# Patient Record
Sex: MALE | Race: AMERICAN INDIAN OR ALASKA NATIVE | NOT HISPANIC OR LATINO | Employment: FULL TIME | URBAN - METROPOLITAN AREA
[De-identification: names, ages, dates, MRNs, and addresses within clinical notes are randomized per-mention and may not be internally consistent; named-entity substitution may affect disease eponyms.]

---

## 2023-01-01 ENCOUNTER — HOSPITAL ENCOUNTER (INPATIENT)
Facility: MEDICAL CENTER | Age: 28
LOS: 4 days | DRG: 432 | End: 2023-12-18
Attending: HOSPITALIST | Admitting: HOSPITALIST
Payer: COMMERCIAL

## 2023-01-01 ENCOUNTER — HOSPITAL ENCOUNTER (OUTPATIENT)
Dept: RADIOLOGY | Facility: MEDICAL CENTER | Age: 28
End: 2023-12-30
Attending: INTERNAL MEDICINE

## 2023-01-01 ENCOUNTER — ANESTHESIA EVENT (OUTPATIENT)
Dept: SURGERY | Facility: MEDICAL CENTER | Age: 28
DRG: 432 | End: 2023-01-01
Payer: COMMERCIAL

## 2023-01-01 ENCOUNTER — HOSPITAL ENCOUNTER (INPATIENT)
Facility: MEDICAL CENTER | Age: 28
LOS: 3 days | DRG: 432 | End: 2023-12-31
Attending: STUDENT IN AN ORGANIZED HEALTH CARE EDUCATION/TRAINING PROGRAM | Admitting: INTERNAL MEDICINE
Payer: COMMERCIAL

## 2023-01-01 ENCOUNTER — ANESTHESIA (OUTPATIENT)
Dept: SURGERY | Facility: MEDICAL CENTER | Age: 28
DRG: 432 | End: 2023-01-01
Payer: COMMERCIAL

## 2023-01-01 ENCOUNTER — APPOINTMENT (OUTPATIENT)
Dept: RADIOLOGY | Facility: MEDICAL CENTER | Age: 28
DRG: 432 | End: 2023-01-01
Attending: INTERNAL MEDICINE
Payer: COMMERCIAL

## 2023-01-01 ENCOUNTER — APPOINTMENT (OUTPATIENT)
Dept: CARDIOLOGY | Facility: MEDICAL CENTER | Age: 28
DRG: 432 | End: 2023-01-01
Payer: COMMERCIAL

## 2023-01-01 ENCOUNTER — APPOINTMENT (OUTPATIENT)
Dept: RADIOLOGY | Facility: MEDICAL CENTER | Age: 28
DRG: 432 | End: 2023-01-01
Attending: STUDENT IN AN ORGANIZED HEALTH CARE EDUCATION/TRAINING PROGRAM
Payer: COMMERCIAL

## 2023-01-01 VITALS
DIASTOLIC BLOOD PRESSURE: 23 MMHG | HEIGHT: 71 IN | WEIGHT: 315 LBS | SYSTOLIC BLOOD PRESSURE: 58 MMHG | RESPIRATION RATE: 3 BRPM | BODY MASS INDEX: 44.1 KG/M2 | TEMPERATURE: 99.9 F | HEART RATE: 71 BPM | OXYGEN SATURATION: 63 %

## 2023-01-01 VITALS
TEMPERATURE: 98.1 F | BODY MASS INDEX: 38.27 KG/M2 | OXYGEN SATURATION: 95 % | HEART RATE: 69 BPM | RESPIRATION RATE: 16 BRPM | SYSTOLIC BLOOD PRESSURE: 114 MMHG | HEIGHT: 71 IN | DIASTOLIC BLOOD PRESSURE: 51 MMHG | WEIGHT: 273.37 LBS

## 2023-01-01 DIAGNOSIS — K76.6 PORTAL HYPERTENSIVE GASTROPATHY (HCC): ICD-10-CM

## 2023-01-01 DIAGNOSIS — I46.9 CARDIAC ARREST (HCC): ICD-10-CM

## 2023-01-01 DIAGNOSIS — R57.8 HEMORRHAGIC SHOCK (HCC): ICD-10-CM

## 2023-01-01 DIAGNOSIS — K31.89 PORTAL HYPERTENSIVE GASTROPATHY (HCC): ICD-10-CM

## 2023-01-01 DIAGNOSIS — K70.31 ALCOHOLIC CIRRHOSIS OF LIVER WITH ASCITES (HCC): ICD-10-CM

## 2023-01-01 DIAGNOSIS — N17.0 ACUTE RENAL FAILURE WITH TUBULAR NECROSIS (HCC): ICD-10-CM

## 2023-01-01 DIAGNOSIS — E87.5 HYPERKALEMIA: ICD-10-CM

## 2023-01-01 DIAGNOSIS — K92.1 HEMATOCHEZIA: ICD-10-CM

## 2023-01-01 DIAGNOSIS — I85.11 SECONDARY ESOPHAGEAL VARICES WITH BLEEDING (HCC): ICD-10-CM

## 2023-01-01 DIAGNOSIS — K70.30 ALCOHOLIC CIRRHOSIS, UNSPECIFIED WHETHER ASCITES PRESENT (HCC): ICD-10-CM

## 2023-01-01 LAB
ABO + RH BLD: NORMAL
ABO GROUP BLD: ABNORMAL
ALBUMIN SERPL BCP-MCNC: 1.7 G/DL (ref 3.2–4.9)
ALBUMIN SERPL BCP-MCNC: 1.9 G/DL (ref 3.2–4.9)
ALBUMIN SERPL BCP-MCNC: 2.3 G/DL (ref 3.2–4.9)
ALBUMIN SERPL BCP-MCNC: 2.4 G/DL (ref 3.2–4.9)
ALBUMIN SERPL BCP-MCNC: 2.5 G/DL (ref 3.2–4.9)
ALBUMIN SERPL BCP-MCNC: 2.5 G/DL (ref 3.2–4.9)
ALBUMIN SERPL BCP-MCNC: 2.6 G/DL (ref 3.2–4.9)
ALBUMIN SERPL BCP-MCNC: 2.6 G/DL (ref 3.2–4.9)
ALBUMIN SERPL BCP-MCNC: 2.7 G/DL (ref 3.2–4.9)
ALBUMIN SERPL BCP-MCNC: 2.9 G/DL (ref 3.2–4.9)
ALBUMIN SERPL BCP-MCNC: 2.9 G/DL (ref 3.2–4.9)
ALBUMIN/GLOB SERPL: 0.7 G/DL
ALBUMIN/GLOB SERPL: 0.8 G/DL
ALBUMIN/GLOB SERPL: 0.9 G/DL
ALBUMIN/GLOB SERPL: 1 G/DL
ALBUMIN/GLOB SERPL: 1.2 G/DL
ALBUMIN/GLOB SERPL: 1.3 G/DL
ALBUMIN/GLOB SERPL: 1.4 G/DL
ALBUMIN/GLOB SERPL: 1.4 G/DL
ALP SERPL-CCNC: 100 U/L (ref 30–99)
ALP SERPL-CCNC: 102 U/L (ref 30–99)
ALP SERPL-CCNC: 109 U/L (ref 30–99)
ALP SERPL-CCNC: 112 U/L (ref 30–99)
ALP SERPL-CCNC: 117 U/L (ref 30–99)
ALP SERPL-CCNC: 124 U/L (ref 30–99)
ALP SERPL-CCNC: 139 U/L (ref 30–99)
ALP SERPL-CCNC: 249 U/L (ref 30–99)
ALP SERPL-CCNC: 271 U/L (ref 30–99)
ALP SERPL-CCNC: 348 U/L (ref 30–99)
ALP SERPL-CCNC: 67 U/L (ref 30–99)
ALT SERPL-CCNC: 174 U/L (ref 2–50)
ALT SERPL-CCNC: 298 U/L (ref 2–50)
ALT SERPL-CCNC: 419 U/L (ref 2–50)
ALT SERPL-CCNC: 50 U/L (ref 2–50)
ALT SERPL-CCNC: 55 U/L (ref 2–50)
ALT SERPL-CCNC: 56 U/L (ref 2–50)
ALT SERPL-CCNC: 66 U/L (ref 2–50)
ALT SERPL-CCNC: 674 U/L (ref 2–50)
ALT SERPL-CCNC: 744 U/L (ref 2–50)
ALT SERPL-CCNC: 806 U/L (ref 2–50)
ALT SERPL-CCNC: 823 U/L (ref 2–50)
AMMONIA PLAS-SCNC: 181 UMOL/L (ref 11–45)
AMMONIA PLAS-SCNC: 184 UMOL/L (ref 11–45)
AMMONIA PLAS-SCNC: 648 UMOL/L (ref 11–45)
ANION GAP SERPL CALC-SCNC: 13 MMOL/L (ref 7–16)
ANION GAP SERPL CALC-SCNC: 19 MMOL/L (ref 7–16)
ANION GAP SERPL CALC-SCNC: 20 MMOL/L (ref 7–16)
ANION GAP SERPL CALC-SCNC: 21 MMOL/L (ref 7–16)
ANION GAP SERPL CALC-SCNC: 22 MMOL/L (ref 7–16)
ANION GAP SERPL CALC-SCNC: 23 MMOL/L (ref 7–16)
ANION GAP SERPL CALC-SCNC: 25 MMOL/L (ref 7–16)
ANION GAP SERPL CALC-SCNC: 26 MMOL/L (ref 7–16)
ANION GAP SERPL CALC-SCNC: 28 MMOL/L (ref 7–16)
ANION GAP SERPL CALC-SCNC: 29 MMOL/L (ref 7–16)
ANION GAP SERPL CALC-SCNC: 32 MMOL/L (ref 7–16)
ANION GAP SERPL CALC-SCNC: 33 MMOL/L (ref 7–16)
ANION GAP SERPL CALC-SCNC: 5 MMOL/L (ref 7–16)
ANION GAP SERPL CALC-SCNC: 7 MMOL/L (ref 7–16)
ANION GAP SERPL CALC-SCNC: 8 MMOL/L (ref 7–16)
ANISOCYTOSIS BLD QL SMEAR: ABNORMAL
AST SERPL-CCNC: 122 U/L (ref 12–45)
AST SERPL-CCNC: 1323 U/L (ref 12–45)
AST SERPL-CCNC: 146 U/L (ref 12–45)
AST SERPL-CCNC: 151 U/L (ref 12–45)
AST SERPL-CCNC: 178 U/L (ref 12–45)
AST SERPL-CCNC: 1964 U/L (ref 12–45)
AST SERPL-CCNC: 2964 U/L (ref 12–45)
AST SERPL-CCNC: 3283 U/L (ref 12–45)
AST SERPL-CCNC: 3384 U/L (ref 12–45)
AST SERPL-CCNC: 3509 U/L (ref 12–45)
AST SERPL-CCNC: 718 U/L (ref 12–45)
B-OH-BUTYR SERPL-MCNC: 0.14 MMOL/L (ref 0.02–0.27)
BACTERIA BRONCH AEROBE CULT: NORMAL
BARCODED ABORH UBTYP: 5100
BARCODED ABORH UBTYP: 600
BARCODED ABORH UBTYP: 6200
BARCODED PRD CODE UBPRD: NORMAL
BARCODED UNIT NUM UBUNT: NORMAL
BASE EXCESS BLDA CALC-SCNC: -11 MMOL/L (ref -4–3)
BASE EXCESS BLDA CALC-SCNC: -12 MMOL/L (ref -4–3)
BASE EXCESS BLDA CALC-SCNC: -15 MMOL/L (ref -4–3)
BASE EXCESS BLDA CALC-SCNC: -17 MMOL/L (ref -4–3)
BASOPHILS # BLD AUTO: 0 % (ref 0–1.8)
BASOPHILS # BLD AUTO: 0.8 % (ref 0–1.8)
BASOPHILS # BLD AUTO: 0.8 % (ref 0–1.8)
BASOPHILS # BLD AUTO: 0.9 % (ref 0–1.8)
BASOPHILS # BLD AUTO: 0.9 % (ref 0–1.8)
BASOPHILS # BLD: 0 K/UL (ref 0–0.12)
BASOPHILS # BLD: 0.09 K/UL (ref 0–0.12)
BASOPHILS # BLD: 0.13 K/UL (ref 0–0.12)
BASOPHILS # BLD: 0.24 K/UL (ref 0–0.12)
BASOPHILS # BLD: 0.28 K/UL (ref 0–0.12)
BILIRUB CONJ SERPL-MCNC: 2.5 MG/DL (ref 0.1–0.5)
BILIRUB CONJ SERPL-MCNC: 9.5 MG/DL (ref 0.1–0.5)
BILIRUB INDIRECT SERPL-MCNC: 2.9 MG/DL (ref 0–1)
BILIRUB INDIRECT SERPL-MCNC: 3.3 MG/DL (ref 0–1)
BILIRUB SERPL-MCNC: 11.6 MG/DL (ref 0.1–1.5)
BILIRUB SERPL-MCNC: 12.2 MG/DL (ref 0.1–1.5)
BILIRUB SERPL-MCNC: 12.3 MG/DL (ref 0.1–1.5)
BILIRUB SERPL-MCNC: 12.6 MG/DL (ref 0.1–1.5)
BILIRUB SERPL-MCNC: 12.8 MG/DL (ref 0.1–1.5)
BILIRUB SERPL-MCNC: 5.4 MG/DL (ref 0.1–1.5)
BILIRUB SERPL-MCNC: 5.8 MG/DL (ref 0.1–1.5)
BILIRUB SERPL-MCNC: 8.2 MG/DL (ref 0.1–1.5)
BILIRUB SERPL-MCNC: 8.9 MG/DL (ref 0.1–1.5)
BILIRUB SERPL-MCNC: 9.5 MG/DL (ref 0.1–1.5)
BILIRUB SERPL-MCNC: 9.7 MG/DL (ref 0.1–1.5)
BLD GP AB SCN SERPL QL: ABNORMAL
BODY TEMPERATURE: ABNORMAL DEGREES
BREATHS SETTING VENT: 22
BREATHS SETTING VENT: 29
BREATHS SETTING VENT: 34
BREATHS SETTING VENT: 450
BUN SERPL-MCNC: 10 MG/DL (ref 8–22)
BUN SERPL-MCNC: 10 MG/DL (ref 8–22)
BUN SERPL-MCNC: 11 MG/DL (ref 8–22)
BUN SERPL-MCNC: 12 MG/DL (ref 8–22)
BUN SERPL-MCNC: 13 MG/DL (ref 8–22)
BUN SERPL-MCNC: 13 MG/DL (ref 8–22)
BUN SERPL-MCNC: 14 MG/DL (ref 8–22)
BUN SERPL-MCNC: 14 MG/DL (ref 8–22)
BUN SERPL-MCNC: 15 MG/DL (ref 8–22)
BUN SERPL-MCNC: 17 MG/DL (ref 8–22)
BUN SERPL-MCNC: 19 MG/DL (ref 8–22)
BUN SERPL-MCNC: 23 MG/DL (ref 8–22)
BUN SERPL-MCNC: 27 MG/DL (ref 8–22)
BUN SERPL-MCNC: 30 MG/DL (ref 8–22)
BUN SERPL-MCNC: 31 MG/DL (ref 8–22)
BUN SERPL-MCNC: 33 MG/DL (ref 8–22)
BUN SERPL-MCNC: 33 MG/DL (ref 8–22)
BUN SERPL-MCNC: 35 MG/DL (ref 8–22)
BUN SERPL-MCNC: 9 MG/DL (ref 8–22)
BUN SERPL-MCNC: 9 MG/DL (ref 8–22)
BURR CELLS BLD QL SMEAR: NORMAL
CA-I SERPL-SCNC: 0.9 MMOL/L (ref 1.1–1.3)
CA-I SERPL-SCNC: 1 MMOL/L (ref 1.1–1.3)
CA-I SERPL-SCNC: 1.1 MMOL/L (ref 1.1–1.3)
CALCIUM ALBUM COR SERPL-MCNC: 10 MG/DL (ref 8.5–10.5)
CALCIUM ALBUM COR SERPL-MCNC: 10.7 MG/DL (ref 8.5–10.5)
CALCIUM ALBUM COR SERPL-MCNC: 11.1 MG/DL (ref 8.5–10.5)
CALCIUM ALBUM COR SERPL-MCNC: 8 MG/DL (ref 8.5–10.5)
CALCIUM ALBUM COR SERPL-MCNC: 8.4 MG/DL (ref 8.5–10.5)
CALCIUM ALBUM COR SERPL-MCNC: 8.5 MG/DL (ref 8.5–10.5)
CALCIUM ALBUM COR SERPL-MCNC: 8.6 MG/DL (ref 8.5–10.5)
CALCIUM ALBUM COR SERPL-MCNC: 8.7 MG/DL (ref 8.5–10.5)
CALCIUM ALBUM COR SERPL-MCNC: 9 MG/DL (ref 8.5–10.5)
CALCIUM ALBUM COR SERPL-MCNC: 9.5 MG/DL (ref 8.5–10.5)
CALCIUM SERPL-MCNC: 7.1 MG/DL (ref 8.5–10.5)
CALCIUM SERPL-MCNC: 7.3 MG/DL (ref 8.5–10.5)
CALCIUM SERPL-MCNC: 7.4 MG/DL (ref 8.5–10.5)
CALCIUM SERPL-MCNC: 7.4 MG/DL (ref 8.5–10.5)
CALCIUM SERPL-MCNC: 7.5 MG/DL (ref 8.5–10.5)
CALCIUM SERPL-MCNC: 7.5 MG/DL (ref 8.5–10.5)
CALCIUM SERPL-MCNC: 7.6 MG/DL (ref 8.5–10.5)
CALCIUM SERPL-MCNC: 7.8 MG/DL (ref 8.5–10.5)
CALCIUM SERPL-MCNC: 8.1 MG/DL (ref 8.5–10.5)
CALCIUM SERPL-MCNC: 8.1 MG/DL (ref 8.5–10.5)
CALCIUM SERPL-MCNC: 8.4 MG/DL (ref 8.5–10.5)
CALCIUM SERPL-MCNC: 8.5 MG/DL (ref 8.5–10.5)
CALCIUM SERPL-MCNC: 8.6 MG/DL (ref 8.5–10.5)
CALCIUM SERPL-MCNC: 8.9 MG/DL (ref 8.5–10.5)
CALCIUM SERPL-MCNC: 9 MG/DL (ref 8.5–10.5)
CALCIUM SERPL-MCNC: 9.1 MG/DL (ref 8.5–10.5)
CALCIUM SERPL-MCNC: 9.4 MG/DL (ref 8.5–10.5)
CFT BLD TEG: 14.4 MIN (ref 4.6–9.1)
CFT BLD TEG: 7.9 MIN (ref 4.6–9.1)
CFT BLD TEG: 9.5 MIN (ref 4.6–9.1)
CFT P HPASE BLD TEG: 10 MIN (ref 4.3–8.3)
CFT P HPASE BLD TEG: 6.7 MIN (ref 4.3–8.3)
CFT P HPASE BLD TEG: 8.3 MIN (ref 4.3–8.3)
CHLORIDE SERPL-SCNC: 100 MMOL/L (ref 96–112)
CHLORIDE SERPL-SCNC: 101 MMOL/L (ref 96–112)
CHLORIDE SERPL-SCNC: 102 MMOL/L (ref 96–112)
CHLORIDE SERPL-SCNC: 95 MMOL/L (ref 96–112)
CHLORIDE SERPL-SCNC: 96 MMOL/L (ref 96–112)
CHLORIDE SERPL-SCNC: 96 MMOL/L (ref 96–112)
CHLORIDE SERPL-SCNC: 97 MMOL/L (ref 96–112)
CHLORIDE SERPL-SCNC: 98 MMOL/L (ref 96–112)
CHLORIDE SERPL-SCNC: 98 MMOL/L (ref 96–112)
CHLORIDE SERPL-SCNC: 99 MMOL/L (ref 96–112)
CHOLEST SERPL-MCNC: 141 MG/DL (ref 100–199)
CK SERPL-CCNC: 424 U/L (ref 0–154)
CLOT ANGLE BLD TEG: 52.8 DEGREES (ref 63–78)
CLOT ANGLE BLD TEG: 63.8 DEGREES (ref 63–78)
CLOT ANGLE BLD TEG: 63.9 DEGREES (ref 63–78)
CLOT LYSIS 30M P MA LENFR BLD TEG: 0 % (ref 0–2.6)
CO2 BLDA-SCNC: 13 MMOL/L (ref 20–33)
CO2 BLDA-SCNC: 16 MMOL/L (ref 20–33)
CO2 BLDA-SCNC: 17 MMOL/L (ref 20–33)
CO2 BLDA-SCNC: 17 MMOL/L (ref 20–33)
CO2 SERPL-SCNC: 11 MMOL/L (ref 20–33)
CO2 SERPL-SCNC: 13 MMOL/L (ref 20–33)
CO2 SERPL-SCNC: 13 MMOL/L (ref 20–33)
CO2 SERPL-SCNC: 14 MMOL/L (ref 20–33)
CO2 SERPL-SCNC: 15 MMOL/L (ref 20–33)
CO2 SERPL-SCNC: 15 MMOL/L (ref 20–33)
CO2 SERPL-SCNC: 16 MMOL/L (ref 20–33)
CO2 SERPL-SCNC: 16 MMOL/L (ref 20–33)
CO2 SERPL-SCNC: 17 MMOL/L (ref 20–33)
CO2 SERPL-SCNC: 25 MMOL/L (ref 20–33)
CO2 SERPL-SCNC: 26 MMOL/L (ref 20–33)
CO2 SERPL-SCNC: 27 MMOL/L (ref 20–33)
CO2 SERPL-SCNC: 29 MMOL/L (ref 20–33)
COMMENT NL1176: ABNORMAL
COMPONENT CT 8504CT: NORMAL
COMPONENT F 8504F: NORMAL
COMPONENT FT 8504FT: NORMAL
COMPONENT P 8504P: NORMAL
COMPONENT R 8504R: NORMAL
CREAT SERPL-MCNC: 0.53 MG/DL (ref 0.5–1.4)
CREAT SERPL-MCNC: 0.6 MG/DL (ref 0.5–1.4)
CREAT SERPL-MCNC: 0.65 MG/DL (ref 0.5–1.4)
CREAT SERPL-MCNC: 0.66 MG/DL (ref 0.5–1.4)
CREAT SERPL-MCNC: 1.39 MG/DL (ref 0.5–1.4)
CREAT SERPL-MCNC: 1.43 MG/DL (ref 0.5–1.4)
CREAT SERPL-MCNC: 1.44 MG/DL (ref 0.5–1.4)
CREAT SERPL-MCNC: 1.45 MG/DL (ref 0.5–1.4)
CREAT SERPL-MCNC: 1.54 MG/DL (ref 0.5–1.4)
CREAT SERPL-MCNC: 1.56 MG/DL (ref 0.5–1.4)
CREAT SERPL-MCNC: 1.72 MG/DL (ref 0.5–1.4)
CREAT SERPL-MCNC: 2.01 MG/DL (ref 0.5–1.4)
CREAT SERPL-MCNC: 2.21 MG/DL (ref 0.5–1.4)
CREAT SERPL-MCNC: 2.45 MG/DL (ref 0.5–1.4)
CREAT SERPL-MCNC: 2.52 MG/DL (ref 0.5–1.4)
CREAT SERPL-MCNC: 2.73 MG/DL (ref 0.5–1.4)
CREAT SERPL-MCNC: 2.79 MG/DL (ref 0.5–1.4)
CREAT SERPL-MCNC: 2.81 MG/DL (ref 0.5–1.4)
CREAT SERPL-MCNC: 2.83 MG/DL (ref 0.5–1.4)
CREAT SERPL-MCNC: 3.38 MG/DL (ref 0.5–1.4)
CT.EXTRINSIC BLD ROTEM: 2.1 MIN (ref 0.8–2.1)
CT.EXTRINSIC BLD ROTEM: 2.2 MIN (ref 0.8–2.1)
CT.EXTRINSIC BLD ROTEM: 3.1 MIN (ref 0.8–2.1)
DELSYS IDSYS: ABNORMAL
EKG IMPRESSION: NORMAL
END TIDAL CARBON DIOXIDE IECO2: 26 MMHG
END TIDAL CARBON DIOXIDE IECO2: 26 MMHG
END TIDAL CARBON DIOXIDE IECO2: 27 MMHG
END TIDAL CARBON DIOXIDE IECO2: 28 MMHG
END TIDAL CARBON DIOXIDE IECO2: 29 MMHG
END TIDAL CARBON DIOXIDE IECO2: 29 MMHG
END TIDAL CARBON DIOXIDE IECO2: 30 MMHG
END TIDAL CARBON DIOXIDE IECO2: 30 MMHG
END TIDAL CARBON DIOXIDE IECO2: 31 MMHG
EOSINOPHIL # BLD AUTO: 0 K/UL (ref 0–0.51)
EOSINOPHIL # BLD AUTO: 0.01 K/UL (ref 0–0.51)
EOSINOPHIL # BLD AUTO: 0.35 K/UL (ref 0–0.51)
EOSINOPHIL # BLD AUTO: 0.56 K/UL (ref 0–0.51)
EOSINOPHIL NFR BLD: 0 % (ref 0–6.9)
EOSINOPHIL NFR BLD: 0.1 % (ref 0–6.9)
EOSINOPHIL NFR BLD: 1.6 % (ref 0–6.9)
EOSINOPHIL NFR BLD: 2.4 % (ref 0–6.9)
ERYTHROCYTE [DISTWIDTH] IN BLOOD BY AUTOMATED COUNT: 48.5 FL (ref 35.9–50)
ERYTHROCYTE [DISTWIDTH] IN BLOOD BY AUTOMATED COUNT: 49.6 FL (ref 35.9–50)
ERYTHROCYTE [DISTWIDTH] IN BLOOD BY AUTOMATED COUNT: 50.6 FL (ref 35.9–50)
ERYTHROCYTE [DISTWIDTH] IN BLOOD BY AUTOMATED COUNT: 50.8 FL (ref 35.9–50)
ERYTHROCYTE [DISTWIDTH] IN BLOOD BY AUTOMATED COUNT: 51.1 FL (ref 35.9–50)
ERYTHROCYTE [DISTWIDTH] IN BLOOD BY AUTOMATED COUNT: 51.2 FL (ref 35.9–50)
ERYTHROCYTE [DISTWIDTH] IN BLOOD BY AUTOMATED COUNT: 52.5 FL (ref 35.9–50)
ERYTHROCYTE [DISTWIDTH] IN BLOOD BY AUTOMATED COUNT: 52.7 FL (ref 35.9–50)
ERYTHROCYTE [DISTWIDTH] IN BLOOD BY AUTOMATED COUNT: 53 FL (ref 35.9–50)
ERYTHROCYTE [DISTWIDTH] IN BLOOD BY AUTOMATED COUNT: 53.2 FL (ref 35.9–50)
ERYTHROCYTE [DISTWIDTH] IN BLOOD BY AUTOMATED COUNT: 55 FL (ref 35.9–50)
ERYTHROCYTE [DISTWIDTH] IN BLOOD BY AUTOMATED COUNT: 55.1 FL (ref 35.9–50)
ERYTHROCYTE [DISTWIDTH] IN BLOOD BY AUTOMATED COUNT: 55.2 FL (ref 35.9–50)
ERYTHROCYTE [DISTWIDTH] IN BLOOD BY AUTOMATED COUNT: 55.8 FL (ref 35.9–50)
ERYTHROCYTE [DISTWIDTH] IN BLOOD BY AUTOMATED COUNT: 57.1 FL (ref 35.9–50)
ERYTHROCYTE [DISTWIDTH] IN BLOOD BY AUTOMATED COUNT: 58.4 FL (ref 35.9–50)
ERYTHROCYTE [DISTWIDTH] IN BLOOD BY AUTOMATED COUNT: 59.1 FL (ref 35.9–50)
ERYTHROCYTE [DISTWIDTH] IN BLOOD BY AUTOMATED COUNT: 59.7 FL (ref 35.9–50)
ERYTHROCYTE [DISTWIDTH] IN BLOOD BY AUTOMATED COUNT: 63.2 FL (ref 35.9–50)
ERYTHROCYTE [DISTWIDTH] IN BLOOD BY AUTOMATED COUNT: 63.9 FL (ref 35.9–50)
EST. AVERAGE GLUCOSE BLD GHB EST-MCNC: 189 MG/DL
ETHANOL BLD-MCNC: <10.1 MG/DL
FERRITIN SERPL-MCNC: 1682 NG/ML (ref 22–322)
FIBRINOGEN PPP-MCNC: 120 MG/DL (ref 215–460)
FIBRINOGEN PPP-MCNC: 165 MG/DL (ref 215–460)
FIBRINOGEN PPP-MCNC: 88 MG/DL (ref 215–460)
GFR SERPLBLD CREATININE-BSD FMLA CKD-EPI: 130 ML/MIN/1.73 M 2
GFR SERPLBLD CREATININE-BSD FMLA CKD-EPI: 131 ML/MIN/1.73 M 2
GFR SERPLBLD CREATININE-BSD FMLA CKD-EPI: 134 ML/MIN/1.73 M 2
GFR SERPLBLD CREATININE-BSD FMLA CKD-EPI: 139 ML/MIN/1.73 M 2
GFR SERPLBLD CREATININE-BSD FMLA CKD-EPI: 24 ML/MIN/1.73 M 2
GFR SERPLBLD CREATININE-BSD FMLA CKD-EPI: 30 ML/MIN/1.73 M 2
GFR SERPLBLD CREATININE-BSD FMLA CKD-EPI: 30 ML/MIN/1.73 M 2
GFR SERPLBLD CREATININE-BSD FMLA CKD-EPI: 31 ML/MIN/1.73 M 2
GFR SERPLBLD CREATININE-BSD FMLA CKD-EPI: 31 ML/MIN/1.73 M 2
GFR SERPLBLD CREATININE-BSD FMLA CKD-EPI: 35 ML/MIN/1.73 M 2
GFR SERPLBLD CREATININE-BSD FMLA CKD-EPI: 36 ML/MIN/1.73 M 2
GFR SERPLBLD CREATININE-BSD FMLA CKD-EPI: 40 ML/MIN/1.73 M 2
GFR SERPLBLD CREATININE-BSD FMLA CKD-EPI: 45 ML/MIN/1.73 M 2
GFR SERPLBLD CREATININE-BSD FMLA CKD-EPI: 55 ML/MIN/1.73 M 2
GFR SERPLBLD CREATININE-BSD FMLA CKD-EPI: 61 ML/MIN/1.73 M 2
GFR SERPLBLD CREATININE-BSD FMLA CKD-EPI: 62 ML/MIN/1.73 M 2
GFR SERPLBLD CREATININE-BSD FMLA CKD-EPI: 67 ML/MIN/1.73 M 2
GFR SERPLBLD CREATININE-BSD FMLA CKD-EPI: 68 ML/MIN/1.73 M 2
GFR SERPLBLD CREATININE-BSD FMLA CKD-EPI: 68 ML/MIN/1.73 M 2
GFR SERPLBLD CREATININE-BSD FMLA CKD-EPI: 70 ML/MIN/1.73 M 2
GLOBULIN SER CALC-MCNC: 1.7 G/DL (ref 1.9–3.5)
GLOBULIN SER CALC-MCNC: 1.8 G/DL (ref 1.9–3.5)
GLOBULIN SER CALC-MCNC: 1.9 G/DL (ref 1.9–3.5)
GLOBULIN SER CALC-MCNC: 2 G/DL (ref 1.9–3.5)
GLOBULIN SER CALC-MCNC: 2 G/DL (ref 1.9–3.5)
GLOBULIN SER CALC-MCNC: 2.1 G/DL (ref 1.9–3.5)
GLOBULIN SER CALC-MCNC: 3.3 G/DL (ref 1.9–3.5)
GLOBULIN SER CALC-MCNC: 3.4 G/DL (ref 1.9–3.5)
GLOBULIN SER CALC-MCNC: 3.6 G/DL (ref 1.9–3.5)
GLOBULIN SER CALC-MCNC: 3.8 G/DL (ref 1.9–3.5)
GLUCOSE BLD STRIP.AUTO-MCNC: 113 MG/DL (ref 65–99)
GLUCOSE BLD STRIP.AUTO-MCNC: 114 MG/DL (ref 65–99)
GLUCOSE BLD STRIP.AUTO-MCNC: 124 MG/DL (ref 65–99)
GLUCOSE BLD STRIP.AUTO-MCNC: 126 MG/DL (ref 65–99)
GLUCOSE BLD STRIP.AUTO-MCNC: 147 MG/DL (ref 65–99)
GLUCOSE BLD STRIP.AUTO-MCNC: 42 MG/DL (ref 65–99)
GLUCOSE BLD STRIP.AUTO-MCNC: 43 MG/DL (ref 65–99)
GLUCOSE BLD STRIP.AUTO-MCNC: 72 MG/DL (ref 65–99)
GLUCOSE BLD STRIP.AUTO-MCNC: 74 MG/DL (ref 65–99)
GLUCOSE BLD STRIP.AUTO-MCNC: 81 MG/DL (ref 65–99)
GLUCOSE BLD STRIP.AUTO-MCNC: 82 MG/DL (ref 65–99)
GLUCOSE BLD STRIP.AUTO-MCNC: 83 MG/DL (ref 65–99)
GLUCOSE BLD STRIP.AUTO-MCNC: 85 MG/DL (ref 65–99)
GLUCOSE BLD STRIP.AUTO-MCNC: 86 MG/DL (ref 65–99)
GLUCOSE BLD STRIP.AUTO-MCNC: 86 MG/DL (ref 65–99)
GLUCOSE BLD STRIP.AUTO-MCNC: 88 MG/DL (ref 65–99)
GLUCOSE BLD STRIP.AUTO-MCNC: 88 MG/DL (ref 65–99)
GLUCOSE BLD STRIP.AUTO-MCNC: 90 MG/DL (ref 65–99)
GLUCOSE BLD STRIP.AUTO-MCNC: 92 MG/DL (ref 65–99)
GLUCOSE BLD STRIP.AUTO-MCNC: 93 MG/DL (ref 65–99)
GLUCOSE BLD STRIP.AUTO-MCNC: 94 MG/DL (ref 65–99)
GLUCOSE BLD STRIP.AUTO-MCNC: 98 MG/DL (ref 65–99)
GLUCOSE SERPL-MCNC: 108 MG/DL (ref 65–99)
GLUCOSE SERPL-MCNC: 125 MG/DL (ref 65–99)
GLUCOSE SERPL-MCNC: 127 MG/DL (ref 65–99)
GLUCOSE SERPL-MCNC: 133 MG/DL (ref 65–99)
GLUCOSE SERPL-MCNC: 143 MG/DL (ref 65–99)
GLUCOSE SERPL-MCNC: 156 MG/DL (ref 65–99)
GLUCOSE SERPL-MCNC: 163 MG/DL (ref 65–99)
GLUCOSE SERPL-MCNC: 185 MG/DL (ref 65–99)
GLUCOSE SERPL-MCNC: 191 MG/DL (ref 65–99)
GLUCOSE SERPL-MCNC: 292 MG/DL (ref 65–99)
GLUCOSE SERPL-MCNC: 46 MG/DL (ref 65–99)
GLUCOSE SERPL-MCNC: 62 MG/DL (ref 65–99)
GLUCOSE SERPL-MCNC: 69 MG/DL (ref 65–99)
GLUCOSE SERPL-MCNC: 77 MG/DL (ref 65–99)
GLUCOSE SERPL-MCNC: 80 MG/DL (ref 65–99)
GLUCOSE SERPL-MCNC: 81 MG/DL (ref 65–99)
GLUCOSE SERPL-MCNC: 82 MG/DL (ref 65–99)
GLUCOSE SERPL-MCNC: 85 MG/DL (ref 65–99)
GLUCOSE SERPL-MCNC: 89 MG/DL (ref 65–99)
GLUCOSE SERPL-MCNC: 96 MG/DL (ref 65–99)
GRAM STN SPEC: NORMAL
GRAM STN SPEC: NORMAL
HAV AB SER QL IA: POSITIVE
HAV IGM SERPL QL IA: NORMAL
HBA1C MFR BLD: 8.2 % (ref 4–5.6)
HBV CORE AB SERPL QL IA: NONREACTIVE
HBV CORE IGM SER QL: NORMAL
HBV SURFACE AB SERPL IA-ACNC: 1937 MIU/ML (ref 0–10)
HBV SURFACE AG SER QL: NORMAL
HBV SURFACE AG SER QL: NORMAL
HCO3 BLDA-SCNC: 12.3 MMOL/L (ref 17–25)
HCO3 BLDA-SCNC: 14.7 MMOL/L (ref 17–25)
HCO3 BLDA-SCNC: 14.9 MMOL/L (ref 17–25)
HCO3 BLDA-SCNC: 15 MMOL/L (ref 17–25)
HCO3 BLDA-SCNC: 15.1 MMOL/L (ref 17–25)
HCO3 BLDA-SCNC: 15.1 MMOL/L (ref 17–25)
HCO3 BLDA-SCNC: 15.4 MMOL/L (ref 17–25)
HCO3 BLDA-SCNC: 15.6 MMOL/L (ref 17–25)
HCO3 BLDA-SCNC: 15.9 MMOL/L (ref 17–25)
HCT VFR BLD AUTO: 26 % (ref 42–52)
HCT VFR BLD AUTO: 26.3 % (ref 42–52)
HCT VFR BLD AUTO: 26.6 % (ref 42–52)
HCT VFR BLD AUTO: 27.1 % (ref 42–52)
HCT VFR BLD AUTO: 27.4 % (ref 42–52)
HCT VFR BLD AUTO: 27.5 % (ref 42–52)
HCT VFR BLD AUTO: 28 % (ref 42–52)
HCT VFR BLD AUTO: 28.3 % (ref 42–52)
HCT VFR BLD AUTO: 29.2 % (ref 42–52)
HCT VFR BLD AUTO: 29.9 % (ref 42–52)
HCT VFR BLD AUTO: 29.9 % (ref 42–52)
HCT VFR BLD AUTO: 30.9 % (ref 42–52)
HCT VFR BLD AUTO: 32 % (ref 42–52)
HCT VFR BLD AUTO: 33.5 % (ref 42–52)
HCT VFR BLD AUTO: 33.6 % (ref 42–52)
HCT VFR BLD AUTO: 33.7 % (ref 42–52)
HCT VFR BLD AUTO: 33.9 % (ref 42–52)
HCT VFR BLD AUTO: 33.9 % (ref 42–52)
HCT VFR BLD AUTO: 34.3 % (ref 42–52)
HCT VFR BLD AUTO: 34.4 % (ref 42–52)
HCT VFR BLD AUTO: 34.6 % (ref 42–52)
HCT VFR BLD AUTO: 34.6 % (ref 42–52)
HCT VFR BLD AUTO: 34.7 % (ref 42–52)
HCT VFR BLD AUTO: 34.7 % (ref 42–52)
HCT VFR BLD AUTO: 35.8 % (ref 42–52)
HCT VFR BLD AUTO: 37 % (ref 42–52)
HCV AB SER QL: NORMAL
HDLC SERPL-MCNC: 27 MG/DL
HGB BLD-MCNC: 10.1 G/DL (ref 14–18)
HGB BLD-MCNC: 10.3 G/DL (ref 14–18)
HGB BLD-MCNC: 10.8 G/DL (ref 14–18)
HGB BLD-MCNC: 11.3 G/DL (ref 14–18)
HGB BLD-MCNC: 11.3 G/DL (ref 14–18)
HGB BLD-MCNC: 11.4 G/DL (ref 14–18)
HGB BLD-MCNC: 11.5 G/DL (ref 14–18)
HGB BLD-MCNC: 11.5 G/DL (ref 14–18)
HGB BLD-MCNC: 11.6 G/DL (ref 14–18)
HGB BLD-MCNC: 11.6 G/DL (ref 14–18)
HGB BLD-MCNC: 11.7 G/DL (ref 14–18)
HGB BLD-MCNC: 11.8 G/DL (ref 14–18)
HGB BLD-MCNC: 11.8 G/DL (ref 14–18)
HGB BLD-MCNC: 12.1 G/DL (ref 14–18)
HGB BLD-MCNC: 8.9 G/DL (ref 14–18)
HGB BLD-MCNC: 9 G/DL (ref 14–18)
HGB BLD-MCNC: 9.2 G/DL (ref 14–18)
HGB BLD-MCNC: 9.2 G/DL (ref 14–18)
HGB BLD-MCNC: 9.3 G/DL (ref 14–18)
HGB BLD-MCNC: 9.3 G/DL (ref 14–18)
HGB BLD-MCNC: 9.5 G/DL (ref 14–18)
HGB BLD-MCNC: 9.5 G/DL (ref 14–18)
HGB BLD-MCNC: 9.7 G/DL (ref 14–18)
HGB BLD-MCNC: 9.8 G/DL (ref 14–18)
HGB BLD-MCNC: 9.8 G/DL (ref 14–18)
HIV 1+2 AB+HIV1 P24 AG SERPL QL IA: NORMAL
HOROWITZ INDEX BLDA+IHG-RTO: 56 MM[HG]
HOROWITZ INDEX BLDA+IHG-RTO: 57 MM[HG]
HOROWITZ INDEX BLDA+IHG-RTO: 71 MM[HG]
HOROWITZ INDEX BLDA+IHG-RTO: 74 MM[HG]
HOROWITZ INDEX BLDA+IHG-RTO: 75 MM[HG]
HOROWITZ INDEX BLDA+IHG-RTO: 77 MM[HG]
HOROWITZ INDEX BLDA+IHG-RTO: 78 MM[HG]
HOROWITZ INDEX BLDA+IHG-RTO: 85 MM[HG]
HOROWITZ INDEX BLDA+IHG-RTO: 88 MM[HG]
HOROWITZ INDEX BLDA+IHG-RTO: 89 MM[HG]
HOROWITZ INDEX BLDA+IHG-RTO: 89 MM[HG]
IMM GRANULOCYTES # BLD AUTO: 0.05 K/UL (ref 0–0.11)
IMM GRANULOCYTES # BLD AUTO: 0.06 K/UL (ref 0–0.11)
IMM GRANULOCYTES NFR BLD AUTO: 0.4 % (ref 0–0.9)
IMM GRANULOCYTES NFR BLD AUTO: 0.5 % (ref 0–0.9)
INR PPP: 1.63 (ref 0.87–1.13)
INR PPP: 1.87 (ref 0.87–1.13)
INR PPP: 1.98 (ref 0.87–1.13)
INR PPP: 2.19 (ref 0.87–1.13)
INR PPP: 2.84 (ref 0.87–1.13)
INR PPP: 3.23 (ref 0.87–1.13)
INR PPP: 3.59 (ref 0.87–1.13)
INR PPP: 3.92 (ref 0.87–1.13)
IRON SATN MFR SERPL: 28 % (ref 15–55)
IRON SERPL-MCNC: 52 UG/DL (ref 50–180)
LACTATE BLD-SCNC: 12.4 MMOL/L (ref 0.5–2)
LACTATE BLD-SCNC: 12.8 MMOL/L (ref 0.5–2)
LACTATE BLD-SCNC: 13.2 MMOL/L (ref 0.5–2)
LACTATE BLD-SCNC: 14.8 MMOL/L (ref 0.5–2)
LACTATE BLD-SCNC: 14.8 MMOL/L (ref 0.5–2)
LACTATE BLD-SCNC: 14.9 MMOL/L (ref 0.5–2)
LACTATE BLD-SCNC: 15.1 MMOL/L (ref 0.5–2)
LACTATE BLD-SCNC: 15.7 MMOL/L (ref 0.5–2)
LACTATE SERPL-SCNC: 11.7 MMOL/L (ref 0.5–2)
LACTATE SERPL-SCNC: 11.8 MMOL/L (ref 0.5–2)
LACTATE SERPL-SCNC: 12.2 MMOL/L (ref 0.5–2)
LACTATE SERPL-SCNC: 13.3 MMOL/L (ref 0.5–2)
LACTATE SERPL-SCNC: 13.9 MMOL/L (ref 0.5–2)
LACTATE SERPL-SCNC: 14.3 MMOL/L (ref 0.5–2)
LACTATE SERPL-SCNC: 14.4 MMOL/L (ref 0.5–2)
LACTATE SERPL-SCNC: 14.8 MMOL/L (ref 0.5–2)
LACTATE SERPL-SCNC: 15 MMOL/L (ref 0.5–2)
LACTATE SERPL-SCNC: 15 MMOL/L (ref 0.5–2)
LACTATE SERPL-SCNC: 15.1 MMOL/L (ref 0.5–2)
LDLC SERPL CALC-MCNC: 94 MG/DL
LIPASE SERPL-CCNC: 32 U/L (ref 11–82)
LV EJECT FRACT MOD 2C 99903: 73.01
LV EJECT FRACT MOD 4C 99902: 73.18
LV EJECT FRACT MOD BP 99901: 70.15
LYMPHOCYTES # BLD AUTO: 0.94 K/UL (ref 1–4.8)
LYMPHOCYTES # BLD AUTO: 1.36 K/UL (ref 1–4.8)
LYMPHOCYTES # BLD AUTO: 1.57 K/UL (ref 1–4.8)
LYMPHOCYTES # BLD AUTO: 1.59 K/UL (ref 1–4.8)
LYMPHOCYTES # BLD AUTO: 1.8 K/UL (ref 1–4.8)
LYMPHOCYTES # BLD AUTO: 2.15 K/UL (ref 1–4.8)
LYMPHOCYTES # BLD AUTO: 2.53 K/UL (ref 1–4.8)
LYMPHOCYTES NFR BLD: 14.5 % (ref 22–41)
LYMPHOCYTES NFR BLD: 14.6 % (ref 22–41)
LYMPHOCYTES NFR BLD: 3.5 % (ref 22–41)
LYMPHOCYTES NFR BLD: 5 % (ref 22–41)
LYMPHOCYTES NFR BLD: 5.8 % (ref 22–41)
LYMPHOCYTES NFR BLD: 6.7 % (ref 22–41)
LYMPHOCYTES NFR BLD: 7.3 % (ref 22–41)
MACROCYTES BLD QL SMEAR: ABNORMAL
MAGNESIUM SERPL-MCNC: 1.5 MG/DL (ref 1.5–2.5)
MAGNESIUM SERPL-MCNC: 1.8 MG/DL (ref 1.5–2.5)
MAGNESIUM SERPL-MCNC: 2 MG/DL (ref 1.5–2.5)
MAGNESIUM SERPL-MCNC: 2.1 MG/DL (ref 1.5–2.5)
MAGNESIUM SERPL-MCNC: 2.2 MG/DL (ref 1.5–2.5)
MAGNESIUM SERPL-MCNC: 2.3 MG/DL (ref 1.5–2.5)
MANUAL DIFF BLD: ABNORMAL
MANUAL DIFF BLD: NORMAL
MCF BLD TEG: 48.7 MM (ref 52–69)
MCF BLD TEG: 49.3 MM (ref 52–69)
MCF BLD TEG: 55 MM (ref 52–69)
MCF.PLATELET INHIB BLD ROTEM: 16.3 MM (ref 15–32)
MCF.PLATELET INHIB BLD ROTEM: 18 MM (ref 15–32)
MCF.PLATELET INHIB BLD ROTEM: 19.4 MM (ref 15–32)
MCH RBC QN AUTO: 29.6 PG (ref 27–33)
MCH RBC QN AUTO: 29.8 PG (ref 27–33)
MCH RBC QN AUTO: 30.2 PG (ref 27–33)
MCH RBC QN AUTO: 30.5 PG (ref 27–33)
MCH RBC QN AUTO: 30.7 PG (ref 27–33)
MCH RBC QN AUTO: 30.8 PG (ref 27–33)
MCH RBC QN AUTO: 30.9 PG (ref 27–33)
MCH RBC QN AUTO: 30.9 PG (ref 27–33)
MCH RBC QN AUTO: 31 PG (ref 27–33)
MCH RBC QN AUTO: 31.2 PG (ref 27–33)
MCH RBC QN AUTO: 31.3 PG (ref 27–33)
MCH RBC QN AUTO: 31.5 PG (ref 27–33)
MCH RBC QN AUTO: 31.6 PG (ref 27–33)
MCH RBC QN AUTO: 31.9 PG (ref 27–33)
MCH RBC QN AUTO: 34 PG (ref 27–33)
MCH RBC QN AUTO: 34.6 PG (ref 27–33)
MCH RBC QN AUTO: 35.1 PG (ref 27–33)
MCHC RBC AUTO-ENTMCNC: 30.8 G/DL (ref 32.3–36.5)
MCHC RBC AUTO-ENTMCNC: 31.9 G/DL (ref 32.3–36.5)
MCHC RBC AUTO-ENTMCNC: 32.7 G/DL (ref 32.3–36.5)
MCHC RBC AUTO-ENTMCNC: 32.8 G/DL (ref 32.3–36.5)
MCHC RBC AUTO-ENTMCNC: 33.2 G/DL (ref 32.3–36.5)
MCHC RBC AUTO-ENTMCNC: 33.3 G/DL (ref 32.3–36.5)
MCHC RBC AUTO-ENTMCNC: 33.5 G/DL (ref 32.3–36.5)
MCHC RBC AUTO-ENTMCNC: 33.7 G/DL (ref 32.3–36.5)
MCHC RBC AUTO-ENTMCNC: 33.8 G/DL (ref 32.3–36.5)
MCHC RBC AUTO-ENTMCNC: 34 G/DL (ref 32.3–36.5)
MCHC RBC AUTO-ENTMCNC: 34 G/DL (ref 32.3–36.5)
MCHC RBC AUTO-ENTMCNC: 34.1 G/DL (ref 32.3–36.5)
MCHC RBC AUTO-ENTMCNC: 34.2 G/DL (ref 32.3–36.5)
MCHC RBC AUTO-ENTMCNC: 34.2 G/DL (ref 32.3–36.5)
MCHC RBC AUTO-ENTMCNC: 34.4 G/DL (ref 32.3–36.5)
MCHC RBC AUTO-ENTMCNC: 34.6 G/DL (ref 32.3–36.5)
MCHC RBC AUTO-ENTMCNC: 34.6 G/DL (ref 32.3–36.5)
MCHC RBC AUTO-ENTMCNC: 35.3 G/DL (ref 32.3–36.5)
MCV RBC AUTO: 101.2 FL (ref 81.4–97.8)
MCV RBC AUTO: 101.5 FL (ref 81.4–97.8)
MCV RBC AUTO: 88.4 FL (ref 81.4–97.8)
MCV RBC AUTO: 89 FL (ref 81.4–97.8)
MCV RBC AUTO: 90.1 FL (ref 81.4–97.8)
MCV RBC AUTO: 90.3 FL (ref 81.4–97.8)
MCV RBC AUTO: 90.5 FL (ref 81.4–97.8)
MCV RBC AUTO: 91.1 FL (ref 81.4–97.8)
MCV RBC AUTO: 91.2 FL (ref 81.4–97.8)
MCV RBC AUTO: 91.3 FL (ref 81.4–97.8)
MCV RBC AUTO: 92 FL (ref 81.4–97.8)
MCV RBC AUTO: 92.3 FL (ref 81.4–97.8)
MCV RBC AUTO: 92.5 FL (ref 81.4–97.8)
MCV RBC AUTO: 92.8 FL (ref 81.4–97.8)
MCV RBC AUTO: 94.2 FL (ref 81.4–97.8)
MCV RBC AUTO: 94.7 FL (ref 81.4–97.8)
MCV RBC AUTO: 94.7 FL (ref 81.4–97.8)
MCV RBC AUTO: 95.2 FL (ref 81.4–97.8)
MCV RBC AUTO: 96.9 FL (ref 81.4–97.8)
MCV RBC AUTO: 98.7 FL (ref 81.4–97.8)
METAMYELOCYTES NFR BLD MANUAL: 0.8 %
METAMYELOCYTES NFR BLD MANUAL: 1.7 %
METAMYELOCYTES NFR BLD MANUAL: 8.9 %
MICROCYTES BLD QL SMEAR: ABNORMAL
MICROCYTES BLD QL SMEAR: ABNORMAL
MODE IMODE: ABNORMAL
MONOCYTES # BLD AUTO: 0.89 K/UL (ref 0–0.85)
MONOCYTES # BLD AUTO: 0.91 K/UL (ref 0–0.85)
MONOCYTES # BLD AUTO: 1.13 K/UL (ref 0–0.85)
MONOCYTES # BLD AUTO: 1.22 K/UL (ref 0–0.85)
MONOCYTES # BLD AUTO: 1.33 K/UL (ref 0–0.85)
MONOCYTES # BLD AUTO: 1.39 K/UL (ref 0–0.85)
MONOCYTES # BLD AUTO: 3.18 K/UL (ref 0–0.85)
MONOCYTES NFR BLD AUTO: 11.7 % (ref 0–13.4)
MONOCYTES NFR BLD AUTO: 12.1 % (ref 0–13.4)
MONOCYTES NFR BLD AUTO: 3.3 % (ref 0–13.4)
MONOCYTES NFR BLD AUTO: 3.4 % (ref 0–13.4)
MONOCYTES NFR BLD AUTO: 4 % (ref 0–13.4)
MONOCYTES NFR BLD AUTO: 4.2 % (ref 0–13.4)
MONOCYTES NFR BLD AUTO: 8.3 % (ref 0–13.4)
MORPHOLOGY BLD-IMP: NORMAL
MYELOCYTES NFR BLD MANUAL: 0.8 %
MYELOCYTES NFR BLD MANUAL: 0.8 %
MYELOCYTES NFR BLD MANUAL: 0.9 %
MYELOCYTES NFR BLD MANUAL: 11.3 %
NEUTROPHILS # BLD AUTO: 10.83 K/UL (ref 1.82–7.42)
NEUTROPHILS # BLD AUTO: 22.1 K/UL (ref 1.82–7.42)
NEUTROPHILS # BLD AUTO: 22.44 K/UL (ref 1.82–7.42)
NEUTROPHILS # BLD AUTO: 23.51 K/UL (ref 1.82–7.42)
NEUTROPHILS # BLD AUTO: 23.96 K/UL (ref 1.82–7.42)
NEUTROPHILS # BLD AUTO: 24.71 K/UL (ref 1.82–7.42)
NEUTROPHILS # BLD AUTO: 7.93 K/UL (ref 1.82–7.42)
NEUTROPHILS NFR BLD: 63.7 % (ref 44–72)
NEUTROPHILS NFR BLD: 72 % (ref 44–72)
NEUTROPHILS NFR BLD: 73.4 % (ref 44–72)
NEUTROPHILS NFR BLD: 75.9 % (ref 44–72)
NEUTROPHILS NFR BLD: 81.7 % (ref 44–72)
NEUTROPHILS NFR BLD: 85.7 % (ref 44–72)
NEUTROPHILS NFR BLD: 88.8 % (ref 44–72)
NEUTS BAND NFR BLD MANUAL: 0.8 % (ref 0–10)
NEUTS BAND NFR BLD MANUAL: 1.7 % (ref 0–10)
NEUTS BAND NFR BLD MANUAL: 12.5 % (ref 0–10)
NEUTS BAND NFR BLD MANUAL: 3.4 % (ref 0–10)
NRBC # BLD AUTO: 0 K/UL
NRBC # BLD AUTO: 0.02 K/UL
NRBC # BLD AUTO: 0.42 K/UL
NRBC # BLD AUTO: 0.64 K/UL
NRBC # BLD AUTO: 0.91 K/UL
NRBC # BLD AUTO: 0.96 K/UL
NRBC # BLD AUTO: 1.47 K/UL
NRBC BLD-RTO: 0 /100 WBC (ref 0–0.2)
NRBC BLD-RTO: 0.1 /100 WBC (ref 0–0.2)
NRBC BLD-RTO: 1.6 /100 WBC (ref 0–0.2)
NRBC BLD-RTO: 2.4 /100 WBC (ref 0–0.2)
NRBC BLD-RTO: 3.4 /100 WBC (ref 0–0.2)
NRBC BLD-RTO: 3.6 /100 WBC (ref 0–0.2)
NRBC BLD-RTO: 4.2 /100 WBC (ref 0–0.2)
O2/TOTAL GAS SETTING VFR VENT: 100 %
O2/TOTAL GAS SETTING VFR VENT: 80 %
PA AA BLD-ACNC: 57.6 % (ref 0–11)
PA AA BLD-ACNC: 75.2 % (ref 0–11)
PA ADP BLD-ACNC: 64.4 % (ref 0–17)
PA ADP BLD-ACNC: 79.4 % (ref 0–17)
PATH REV: NORMAL
PCO2 BLDA: 31.6 MMHG (ref 26–37)
PCO2 BLDA: 32.6 MMHG (ref 26–37)
PCO2 BLDA: 33.4 MMHG (ref 26–37)
PCO2 BLDA: 34 MMHG (ref 26–37)
PCO2 BLDA: 34.1 MMHG (ref 26–37)
PCO2 BLDA: 36.1 MMHG (ref 26–37)
PCO2 BLDA: 36.6 MMHG (ref 26–37)
PCO2 BLDA: 36.7 MMHG (ref 26–37)
PCO2 BLDA: 39.5 MMHG (ref 26–37)
PCO2 BLDA: 39.5 MMHG (ref 26–37)
PCO2 BLDA: 50.9 MMHG (ref 26–37)
PCO2 TEMP ADJ BLDA: 30.5 MMHG (ref 26–37)
PCO2 TEMP ADJ BLDA: 32.2 MMHG (ref 26–37)
PCO2 TEMP ADJ BLDA: 32.3 MMHG (ref 26–37)
PCO2 TEMP ADJ BLDA: 32.6 MMHG (ref 26–37)
PCO2 TEMP ADJ BLDA: 33.4 MMHG (ref 26–37)
PCO2 TEMP ADJ BLDA: 34 MMHG (ref 26–37)
PCO2 TEMP ADJ BLDA: 34.4 MMHG (ref 26–37)
PCO2 TEMP ADJ BLDA: 35.4 MMHG (ref 26–37)
PCO2 TEMP ADJ BLDA: 36.3 MMHG (ref 26–37)
PCO2 TEMP ADJ BLDA: 37 MMHG (ref 26–37)
PCO2 TEMP ADJ BLDA: 47.7 MMHG (ref 26–37)
PEEP END EXPIRATORY PRESSURE IPEEP: 10 CMH20
PEEP END EXPIRATORY PRESSURE IPEEP: 14 CMH20
PEEP END EXPIRATORY PRESSURE IPEEP: 16 CMH20
PH BLDA: 7.07 [PH] (ref 7.4–7.5)
PH BLDA: 7.1 [PH] (ref 7.4–7.5)
PH BLDA: 7.21 [PH] (ref 7.4–7.5)
PH BLDA: 7.22 [PH] (ref 7.4–7.5)
PH BLDA: 7.24 [PH] (ref 7.4–7.5)
PH BLDA: 7.24 [PH] (ref 7.4–7.5)
PH BLDA: 7.25 [PH] (ref 7.4–7.5)
PH BLDA: 7.27 [PH] (ref 7.4–7.5)
PH BLDA: 7.28 [PH] (ref 7.4–7.5)
PH TEMP ADJ BLDA: 7.09 [PH] (ref 7.4–7.5)
PH TEMP ADJ BLDA: 7.13 [PH] (ref 7.4–7.5)
PH TEMP ADJ BLDA: 7.23 [PH] (ref 7.4–7.5)
PH TEMP ADJ BLDA: 7.23 [PH] (ref 7.4–7.5)
PH TEMP ADJ BLDA: 7.25 [PH] (ref 7.4–7.5)
PH TEMP ADJ BLDA: 7.25 [PH] (ref 7.4–7.5)
PH TEMP ADJ BLDA: 7.26 [PH] (ref 7.4–7.5)
PH TEMP ADJ BLDA: 7.28 [PH] (ref 7.4–7.5)
PH TEMP ADJ BLDA: 7.29 [PH] (ref 7.4–7.5)
PHOSPHATE SERPL-MCNC: 2.9 MG/DL (ref 2.5–4.5)
PHOSPHATE SERPL-MCNC: 5.5 MG/DL (ref 2.5–4.5)
PHOSPHATE SERPL-MCNC: 5.6 MG/DL (ref 2.5–4.5)
PHOSPHATE SERPL-MCNC: 5.7 MG/DL (ref 2.5–4.5)
PHOSPHATE SERPL-MCNC: 6 MG/DL (ref 2.5–4.5)
PHOSPHATE SERPL-MCNC: 6 MG/DL (ref 2.5–4.5)
PHOSPHATE SERPL-MCNC: 6.1 MG/DL (ref 2.5–4.5)
PHOSPHATE SERPL-MCNC: 6.1 MG/DL (ref 2.5–4.5)
PHOSPHATE SERPL-MCNC: 6.2 MG/DL (ref 2.5–4.5)
PHOSPHATE SERPL-MCNC: 6.4 MG/DL (ref 2.5–4.5)
PHOSPHATE SERPL-MCNC: 6.5 MG/DL (ref 2.5–4.5)
PHOSPHATE SERPL-MCNC: 6.8 MG/DL (ref 2.5–4.5)
PHOSPHATE SERPL-MCNC: 9.1 MG/DL (ref 2.5–4.5)
PLATELET # BLD AUTO: 100 K/UL (ref 164–446)
PLATELET # BLD AUTO: 101 K/UL (ref 164–446)
PLATELET # BLD AUTO: 102 K/UL (ref 164–446)
PLATELET # BLD AUTO: 104 K/UL (ref 164–446)
PLATELET # BLD AUTO: 108 K/UL (ref 164–446)
PLATELET # BLD AUTO: 120 K/UL (ref 164–446)
PLATELET # BLD AUTO: 137 K/UL (ref 164–446)
PLATELET # BLD AUTO: 53 K/UL (ref 164–446)
PLATELET # BLD AUTO: 70 K/UL (ref 164–446)
PLATELET # BLD AUTO: 83 K/UL (ref 164–446)
PLATELET # BLD AUTO: 84 K/UL (ref 164–446)
PLATELET # BLD AUTO: 87 K/UL (ref 164–446)
PLATELET # BLD AUTO: 88 K/UL (ref 164–446)
PLATELET # BLD AUTO: 88 K/UL (ref 164–446)
PLATELET # BLD AUTO: 92 K/UL (ref 164–446)
PLATELET # BLD AUTO: 92 K/UL (ref 164–446)
PLATELET # BLD AUTO: 95 K/UL (ref 164–446)
PLATELET # BLD AUTO: 97 K/UL (ref 164–446)
PLATELET BLD QL SMEAR: NORMAL
PLATELETS.RETICULATED NFR BLD AUTO: 10.3 % (ref 0.6–13.1)
PLATELETS.RETICULATED NFR BLD AUTO: 3.5 % (ref 0.6–13.1)
PLATELETS.RETICULATED NFR BLD AUTO: 4.2 % (ref 0.6–13.1)
PLATELETS.RETICULATED NFR BLD AUTO: 4.2 % (ref 0.6–13.1)
PLATELETS.RETICULATED NFR BLD AUTO: 4.8 % (ref 0.6–13.1)
PLATELETS.RETICULATED NFR BLD AUTO: 5 % (ref 0.6–13.1)
PLATELETS.RETICULATED NFR BLD AUTO: 6.1 % (ref 0.6–13.1)
PLATELETS.RETICULATED NFR BLD AUTO: 6.1 % (ref 0.6–13.1)
PLATELETS.RETICULATED NFR BLD AUTO: 7.2 % (ref 0.6–13.1)
PLATELETS.RETICULATED NFR BLD AUTO: 7.3 % (ref 0.6–13.1)
PMV BLD AUTO: 10 FL (ref 9–12.9)
PMV BLD AUTO: 10.1 FL (ref 9–12.9)
PMV BLD AUTO: 10.2 FL (ref 9–12.9)
PMV BLD AUTO: 10.3 FL (ref 9–12.9)
PMV BLD AUTO: 10.3 FL (ref 9–12.9)
PMV BLD AUTO: 10.5 FL (ref 9–12.9)
PMV BLD AUTO: 10.6 FL (ref 9–12.9)
PMV BLD AUTO: 10.6 FL (ref 9–12.9)
PMV BLD AUTO: 10.9 FL (ref 9–12.9)
PMV BLD AUTO: 11.3 FL (ref 9–12.9)
PMV BLD AUTO: 11.7 FL (ref 9–12.9)
PMV BLD AUTO: 11.8 FL (ref 9–12.9)
PMV BLD AUTO: 9.6 FL (ref 9–12.9)
PMV BLD AUTO: 9.6 FL (ref 9–12.9)
PMV BLD AUTO: 9.8 FL (ref 9–12.9)
PO2 BLDA: 56 MMHG (ref 64–87)
PO2 BLDA: 57 MMHG (ref 64–87)
PO2 BLDA: 60 MMHG (ref 64–87)
PO2 BLDA: 71 MMHG (ref 64–87)
PO2 BLDA: 74 MMHG (ref 64–87)
PO2 BLDA: 77 MMHG (ref 64–87)
PO2 BLDA: 78 MMHG (ref 64–87)
PO2 BLDA: 85 MMHG (ref 64–87)
PO2 BLDA: 88 MMHG (ref 64–87)
PO2 BLDA: 89 MMHG (ref 64–87)
PO2 BLDA: 89 MMHG (ref 64–87)
PO2 TEMP ADJ BLDA: 49 MMHG (ref 64–87)
PO2 TEMP ADJ BLDA: 51 MMHG (ref 64–87)
PO2 TEMP ADJ BLDA: 54 MMHG (ref 64–87)
PO2 TEMP ADJ BLDA: 67 MMHG (ref 64–87)
PO2 TEMP ADJ BLDA: 72 MMHG (ref 64–87)
PO2 TEMP ADJ BLDA: 76 MMHG (ref 64–87)
PO2 TEMP ADJ BLDA: 84 MMHG (ref 64–87)
PO2 TEMP ADJ BLDA: 85 MMHG (ref 64–87)
PO2 TEMP ADJ BLDA: 87 MMHG (ref 64–87)
POIKILOCYTOSIS BLD QL SMEAR: NORMAL
POLYCHROMASIA BLD QL SMEAR: NORMAL
POTASSIUM SERPL-SCNC: 3.7 MMOL/L (ref 3.6–5.5)
POTASSIUM SERPL-SCNC: 3.8 MMOL/L (ref 3.6–5.5)
POTASSIUM SERPL-SCNC: 3.9 MMOL/L (ref 3.6–5.5)
POTASSIUM SERPL-SCNC: 4.4 MMOL/L (ref 3.6–5.5)
POTASSIUM SERPL-SCNC: 4.5 MMOL/L (ref 3.6–5.5)
POTASSIUM SERPL-SCNC: 4.7 MMOL/L (ref 3.6–5.5)
POTASSIUM SERPL-SCNC: 4.9 MMOL/L (ref 3.6–5.5)
POTASSIUM SERPL-SCNC: 5 MMOL/L (ref 3.6–5.5)
POTASSIUM SERPL-SCNC: 5.3 MMOL/L (ref 3.6–5.5)
POTASSIUM SERPL-SCNC: 5.4 MMOL/L (ref 3.6–5.5)
POTASSIUM SERPL-SCNC: 5.5 MMOL/L (ref 3.6–5.5)
POTASSIUM SERPL-SCNC: 5.5 MMOL/L (ref 3.6–5.5)
POTASSIUM SERPL-SCNC: 5.6 MMOL/L (ref 3.6–5.5)
POTASSIUM SERPL-SCNC: 5.8 MMOL/L (ref 3.6–5.5)
POTASSIUM SERPL-SCNC: 5.9 MMOL/L (ref 3.6–5.5)
POTASSIUM SERPL-SCNC: 5.9 MMOL/L (ref 3.6–5.5)
POTASSIUM SERPL-SCNC: 7.2 MMOL/L (ref 3.6–5.5)
PRODUCT TYPE UPROD: NORMAL
PROMYELOCYTES NFR BLD MANUAL: 2.4 %
PROT SERPL-MCNC: 3.6 G/DL (ref 6–8.2)
PROT SERPL-MCNC: 3.9 G/DL (ref 6–8.2)
PROT SERPL-MCNC: 4 G/DL (ref 6–8.2)
PROT SERPL-MCNC: 4.2 G/DL (ref 6–8.2)
PROT SERPL-MCNC: 4.6 G/DL (ref 6–8.2)
PROT SERPL-MCNC: 4.6 G/DL (ref 6–8.2)
PROT SERPL-MCNC: 4.7 G/DL (ref 6–8.2)
PROT SERPL-MCNC: 5.9 G/DL (ref 6–8.2)
PROT SERPL-MCNC: 6 G/DL (ref 6–8.2)
PROT SERPL-MCNC: 6.1 G/DL (ref 6–8.2)
PROT SERPL-MCNC: 6.7 G/DL (ref 6–8.2)
PROTHROMBIN TIME: 19.5 SEC (ref 12–14.6)
PROTHROMBIN TIME: 21.8 SEC (ref 12–14.6)
PROTHROMBIN TIME: 22.8 SEC (ref 12–14.6)
PROTHROMBIN TIME: 24.6 SEC (ref 12–14.6)
PROTHROMBIN TIME: 30.3 SEC (ref 12–14.6)
PROTHROMBIN TIME: 33.5 SEC (ref 12–14.6)
PROTHROMBIN TIME: 36.3 SEC (ref 12–14.6)
PROTHROMBIN TIME: 39 SEC (ref 12–14.6)
RBC # BLD AUTO: 2.57 M/UL (ref 4.7–6.1)
RBC # BLD AUTO: 2.62 M/UL (ref 4.7–6.1)
RBC # BLD AUTO: 3.03 M/UL (ref 4.7–6.1)
RBC # BLD AUTO: 3.14 M/UL (ref 4.7–6.1)
RBC # BLD AUTO: 3.28 M/UL (ref 4.7–6.1)
RBC # BLD AUTO: 3.28 M/UL (ref 4.7–6.1)
RBC # BLD AUTO: 3.58 M/UL (ref 4.7–6.1)
RBC # BLD AUTO: 3.58 M/UL (ref 4.7–6.1)
RBC # BLD AUTO: 3.62 M/UL (ref 4.7–6.1)
RBC # BLD AUTO: 3.64 M/UL (ref 4.7–6.1)
RBC # BLD AUTO: 3.69 M/UL (ref 4.7–6.1)
RBC # BLD AUTO: 3.72 M/UL (ref 4.7–6.1)
RBC # BLD AUTO: 3.74 M/UL (ref 4.7–6.1)
RBC # BLD AUTO: 3.75 M/UL (ref 4.7–6.1)
RBC # BLD AUTO: 3.77 M/UL (ref 4.7–6.1)
RBC # BLD AUTO: 3.79 M/UL (ref 4.7–6.1)
RBC # BLD AUTO: 3.8 M/UL (ref 4.7–6.1)
RBC # BLD AUTO: 3.82 M/UL (ref 4.7–6.1)
RBC # BLD AUTO: 3.84 M/UL (ref 4.7–6.1)
RBC # BLD AUTO: 3.88 M/UL (ref 4.7–6.1)
RBC BLD AUTO: PRESENT
RH BLD: ABNORMAL
SAO2 % BLDA: 76 % (ref 93–99)
SAO2 % BLDA: 83 % (ref 93–99)
SAO2 % BLDA: 85 % (ref 93–99)
SAO2 % BLDA: 92 % (ref 93–99)
SAO2 % BLDA: 92 % (ref 93–99)
SAO2 % BLDA: 93 % (ref 93–99)
SAO2 % BLDA: 94 % (ref 93–99)
SAO2 % BLDA: 95 % (ref 93–99)
SAO2 % BLDA: 96 % (ref 93–99)
SIGNIFICANT IND 70042: NORMAL
SIGNIFICANT IND 70042: NORMAL
SITE SITE: NORMAL
SITE SITE: NORMAL
SODIUM SERPL-SCNC: 131 MMOL/L (ref 135–145)
SODIUM SERPL-SCNC: 132 MMOL/L (ref 135–145)
SODIUM SERPL-SCNC: 133 MMOL/L (ref 135–145)
SODIUM SERPL-SCNC: 134 MMOL/L (ref 135–145)
SODIUM SERPL-SCNC: 135 MMOL/L (ref 135–145)
SODIUM SERPL-SCNC: 135 MMOL/L (ref 135–145)
SODIUM SERPL-SCNC: 136 MMOL/L (ref 135–145)
SODIUM SERPL-SCNC: 136 MMOL/L (ref 135–145)
SODIUM SERPL-SCNC: 137 MMOL/L (ref 135–145)
SODIUM SERPL-SCNC: 137 MMOL/L (ref 135–145)
SODIUM SERPL-SCNC: 138 MMOL/L (ref 135–145)
SODIUM SERPL-SCNC: 138 MMOL/L (ref 135–145)
SODIUM SERPL-SCNC: 139 MMOL/L (ref 135–145)
SODIUM SERPL-SCNC: 141 MMOL/L (ref 135–145)
SODIUM SERPL-SCNC: 142 MMOL/L (ref 135–145)
SODIUM SERPL-SCNC: 143 MMOL/L (ref 135–145)
SOURCE SOURCE: NORMAL
SOURCE SOURCE: NORMAL
SPECIMEN DRAWN FROM PATIENT: ABNORMAL
TEG ALGORITHM TGALG: ABNORMAL
TIBC SERPL-MCNC: 183 UG/DL (ref 250–450)
TIDAL VOLUME IVT: 400 ML
TIDAL VOLUME IVT: 400 ML
TIDAL VOLUME IVT: 450 ML
TOXIC GRANULES BLD QL SMEAR: NORMAL
TRANSFERRIN SERPL-MCNC: 137 MG/DL (ref 200–370)
TRIGL SERPL-MCNC: 101 MG/DL (ref 0–149)
TRIGL SERPL-MCNC: 52 MG/DL (ref 0–149)
UIBC SERPL-MCNC: 131 UG/DL (ref 110–370)
UNIT STATUS USTAT: NORMAL
WBC # BLD AUTO: 11 K/UL (ref 4.8–10.8)
WBC # BLD AUTO: 14.3 K/UL (ref 4.8–10.8)
WBC # BLD AUTO: 14.6 K/UL (ref 4.8–10.8)
WBC # BLD AUTO: 14.7 K/UL (ref 4.8–10.8)
WBC # BLD AUTO: 18.3 K/UL (ref 4.8–10.8)
WBC # BLD AUTO: 19.4 K/UL (ref 4.8–10.8)
WBC # BLD AUTO: 23.6 K/UL (ref 4.8–10.8)
WBC # BLD AUTO: 25.7 K/UL (ref 4.8–10.8)
WBC # BLD AUTO: 26.8 K/UL (ref 4.8–10.8)
WBC # BLD AUTO: 26.9 K/UL (ref 4.8–10.8)
WBC # BLD AUTO: 26.9 K/UL (ref 4.8–10.8)
WBC # BLD AUTO: 27 K/UL (ref 4.8–10.8)
WBC # BLD AUTO: 27.1 K/UL (ref 4.8–10.8)
WBC # BLD AUTO: 27.2 K/UL (ref 4.8–10.8)
WBC # BLD AUTO: 27.5 K/UL (ref 4.8–10.8)
WBC # BLD AUTO: 27.5 K/UL (ref 4.8–10.8)
WBC # BLD AUTO: 27.6 K/UL (ref 4.8–10.8)
WBC # BLD AUTO: 28.1 K/UL (ref 4.8–10.8)
WBC # BLD AUTO: 29.8 K/UL (ref 4.8–10.8)
WBC # BLD AUTO: 34.7 K/UL (ref 4.8–10.8)
WBC TOXIC VACUOLES BLD QL SMEAR: NORMAL

## 2023-01-01 PROCEDURE — A9270 NON-COVERED ITEM OR SERVICE: HCPCS | Performed by: STUDENT IN AN ORGANIZED HEALTH CARE EDUCATION/TRAINING PROGRAM

## 2023-01-01 PROCEDURE — 84100 ASSAY OF PHOSPHORUS: CPT

## 2023-01-01 PROCEDURE — 700111 HCHG RX REV CODE 636 W/ 250 OVERRIDE (IP): Performed by: INTERNAL MEDICINE

## 2023-01-01 PROCEDURE — 85014 HEMATOCRIT: CPT

## 2023-01-01 PROCEDURE — 770001 HCHG ROOM/CARE - MED/SURG/GYN PRIV*

## 2023-01-01 PROCEDURE — 93010 ELECTROCARDIOGRAM REPORT: CPT | Performed by: INTERNAL MEDICINE

## 2023-01-01 PROCEDURE — 36620 INSERTION CATHETER ARTERY: CPT

## 2023-01-01 PROCEDURE — 87389 HIV-1 AG W/HIV-1&-2 AB AG IA: CPT

## 2023-01-01 PROCEDURE — 700105 HCHG RX REV CODE 258: Performed by: STUDENT IN AN ORGANIZED HEALTH CARE EDUCATION/TRAINING PROGRAM

## 2023-01-01 PROCEDURE — 160002 HCHG RECOVERY MINUTES (STAT): Performed by: INTERNAL MEDICINE

## 2023-01-01 PROCEDURE — 99232 SBSQ HOSP IP/OBS MODERATE 35: CPT | Performed by: NURSE PRACTITIONER

## 2023-01-01 PROCEDURE — 93005 ELECTROCARDIOGRAM TRACING: CPT | Performed by: STUDENT IN AN ORGANIZED HEALTH CARE EDUCATION/TRAINING PROGRAM

## 2023-01-01 PROCEDURE — 700105 HCHG RX REV CODE 258

## 2023-01-01 PROCEDURE — 94799 UNLISTED PULMONARY SVC/PX: CPT

## 2023-01-01 PROCEDURE — 700105 HCHG RX REV CODE 258: Performed by: INTERNAL MEDICINE

## 2023-01-01 PROCEDURE — 160203 HCHG ENDO MINUTES - 1ST 30 MINS LEVEL 4: Performed by: INTERNAL MEDICINE

## 2023-01-01 PROCEDURE — 37799 UNLISTED PX VASCULAR SURGERY: CPT

## 2023-01-01 PROCEDURE — 36600 WITHDRAWAL OF ARTERIAL BLOOD: CPT

## 2023-01-01 PROCEDURE — 87205 SMEAR GRAM STAIN: CPT

## 2023-01-01 PROCEDURE — 83735 ASSAY OF MAGNESIUM: CPT

## 2023-01-01 PROCEDURE — 0DJ08ZZ INSPECTION OF UPPER INTESTINAL TRACT, VIA NATURAL OR ARTIFICIAL OPENING ENDOSCOPIC: ICD-10-PCS | Performed by: INTERNAL MEDICINE

## 2023-01-01 PROCEDURE — 90945 DIALYSIS ONE EVALUATION: CPT

## 2023-01-01 PROCEDURE — 71045 X-RAY EXAM CHEST 1 VIEW: CPT

## 2023-01-01 PROCEDURE — P9012 CRYOPRECIPITATE EACH UNIT: HCPCS | Mod: 91

## 2023-01-01 PROCEDURE — 160208 HCHG ENDO MINUTES - EA ADDL 1 MIN LEVEL 4: Performed by: INTERNAL MEDICINE

## 2023-01-01 PROCEDURE — 96368 THER/DIAG CONCURRENT INF: CPT

## 2023-01-01 PROCEDURE — 87070 CULTURE OTHR SPECIMN AEROBIC: CPT

## 2023-01-01 PROCEDURE — A9270 NON-COVERED ITEM OR SERVICE: HCPCS | Performed by: INTERNAL MEDICINE

## 2023-01-01 PROCEDURE — 700102 HCHG RX REV CODE 250 W/ 637 OVERRIDE(OP): Performed by: STUDENT IN AN ORGANIZED HEALTH CARE EDUCATION/TRAINING PROGRAM

## 2023-01-01 PROCEDURE — 80503 PATH CLIN CONSLTJ SF 5-20: CPT

## 2023-01-01 PROCEDURE — 0B9F8ZX DRAINAGE OF RIGHT LOWER LUNG LOBE, VIA NATURAL OR ARTIFICIAL OPENING ENDOSCOPIC, DIAGNOSTIC: ICD-10-PCS | Performed by: INTERNAL MEDICINE

## 2023-01-01 PROCEDURE — P9047 ALBUMIN (HUMAN), 25%, 50ML: HCPCS | Mod: JZ,JG | Performed by: INTERNAL MEDICINE

## 2023-01-01 PROCEDURE — 82330 ASSAY OF CALCIUM: CPT | Mod: 91

## 2023-01-01 PROCEDURE — 85018 HEMOGLOBIN: CPT | Mod: 91

## 2023-01-01 PROCEDURE — 43244 EGD VARICES LIGATION: CPT | Performed by: INTERNAL MEDICINE

## 2023-01-01 PROCEDURE — 84100 ASSAY OF PHOSPHORUS: CPT | Mod: 91

## 2023-01-01 PROCEDURE — 99152 MOD SED SAME PHYS/QHP 5/>YRS: CPT

## 2023-01-01 PROCEDURE — 99291 CRITICAL CARE FIRST HOUR: CPT | Mod: GC | Performed by: INTERNAL MEDICINE

## 2023-01-01 PROCEDURE — P9017 PLASMA 1 DONOR FRZ W/IN 8 HR: HCPCS

## 2023-01-01 PROCEDURE — 160048 HCHG OR STATISTICAL LEVEL 1-5: Performed by: INTERNAL MEDICINE

## 2023-01-01 PROCEDURE — 30233R1 TRANSFUSION OF NONAUTOLOGOUS PLATELETS INTO PERIPHERAL VEIN, PERCUTANEOUS APPROACH: ICD-10-PCS | Performed by: INTERNAL MEDICINE

## 2023-01-01 PROCEDURE — P9034 PLATELETS, PHERESIS: HCPCS | Mod: 91

## 2023-01-01 PROCEDURE — 99292 CRITICAL CARE ADDL 30 MIN: CPT | Mod: GC | Performed by: INTERNAL MEDICINE

## 2023-01-01 PROCEDURE — 82803 BLOOD GASES ANY COMBINATION: CPT | Mod: 91

## 2023-01-01 PROCEDURE — 85007 BL SMEAR W/DIFF WBC COUNT: CPT | Mod: 91

## 2023-01-01 PROCEDURE — 700111 HCHG RX REV CODE 636 W/ 250 OVERRIDE (IP): Mod: JZ | Performed by: INTERNAL MEDICINE

## 2023-01-01 PROCEDURE — 85610 PROTHROMBIN TIME: CPT | Mod: 91

## 2023-01-01 PROCEDURE — 99239 HOSP IP/OBS DSCHRG MGMT >30: CPT | Performed by: INTERNAL MEDICINE

## 2023-01-01 PROCEDURE — 06L38CZ OCCLUSION OF ESOPHAGEAL VEIN WITH EXTRALUMINAL DEVICE, VIA NATURAL OR ARTIFICIAL OPENING ENDOSCOPIC: ICD-10-PCS | Performed by: INTERNAL MEDICINE

## 2023-01-01 PROCEDURE — 85384 FIBRINOGEN ACTIVITY: CPT | Mod: 91

## 2023-01-01 PROCEDURE — 82962 GLUCOSE BLOOD TEST: CPT | Mod: 91

## 2023-01-01 PROCEDURE — 85055 RETICULATED PLATELET ASSAY: CPT | Mod: 91

## 2023-01-01 PROCEDURE — 31624 DX BRONCHOSCOPE/LAVAGE: CPT | Performed by: INTERNAL MEDICINE

## 2023-01-01 PROCEDURE — 85576 BLOOD PLATELET AGGREGATION: CPT

## 2023-01-01 PROCEDURE — 36556 INSERT NON-TUNNEL CV CATH: CPT | Performed by: INTERNAL MEDICINE

## 2023-01-01 PROCEDURE — 99291 CRITICAL CARE FIRST HOUR: CPT | Mod: 25 | Performed by: INTERNAL MEDICINE

## 2023-01-01 PROCEDURE — 80053 COMPREHEN METABOLIC PANEL: CPT | Mod: 91

## 2023-01-01 PROCEDURE — 770022 HCHG ROOM/CARE - ICU (200)

## 2023-01-01 PROCEDURE — 87340 HEPATITIS B SURFACE AG IA: CPT

## 2023-01-01 PROCEDURE — 86901 BLOOD TYPING SEROLOGIC RH(D): CPT

## 2023-01-01 PROCEDURE — 94003 VENT MGMT INPAT SUBQ DAY: CPT

## 2023-01-01 PROCEDURE — 83516 IMMUNOASSAY NONANTIBODY: CPT | Mod: 91

## 2023-01-01 PROCEDURE — 85055 RETICULATED PLATELET ASSAY: CPT

## 2023-01-01 PROCEDURE — 70450 CT HEAD/BRAIN W/O DYE: CPT

## 2023-01-01 PROCEDURE — C9113 INJ PANTOPRAZOLE SODIUM, VIA: HCPCS | Performed by: INTERNAL MEDICINE

## 2023-01-01 PROCEDURE — 700111 HCHG RX REV CODE 636 W/ 250 OVERRIDE (IP): Mod: JA | Performed by: INTERNAL MEDICINE

## 2023-01-01 PROCEDURE — 700101 HCHG RX REV CODE 250

## 2023-01-01 PROCEDURE — 700101 HCHG RX REV CODE 250: Performed by: STUDENT IN AN ORGANIZED HEALTH CARE EDUCATION/TRAINING PROGRAM

## 2023-01-01 PROCEDURE — 770020 HCHG ROOM/CARE - TELE (206)

## 2023-01-01 PROCEDURE — 85027 COMPLETE CBC AUTOMATED: CPT | Mod: 91

## 2023-01-01 PROCEDURE — 99233 SBSQ HOSP IP/OBS HIGH 50: CPT | Performed by: NURSE PRACTITIONER

## 2023-01-01 PROCEDURE — 700111 HCHG RX REV CODE 636 W/ 250 OVERRIDE (IP)

## 2023-01-01 PROCEDURE — 36620 INSERTION CATHETER ARTERY: CPT | Performed by: INTERNAL MEDICINE

## 2023-01-01 PROCEDURE — 80053 COMPREHEN METABOLIC PANEL: CPT

## 2023-01-01 PROCEDURE — 700101 HCHG RX REV CODE 250: Performed by: INTERNAL MEDICINE

## 2023-01-01 PROCEDURE — 86708 HEPATITIS A ANTIBODY: CPT

## 2023-01-01 PROCEDURE — 85007 BL SMEAR W/DIFF WBC COUNT: CPT

## 2023-01-01 PROCEDURE — P9017 PLASMA 1 DONOR FRZ W/IN 8 HR: HCPCS | Mod: 91

## 2023-01-01 PROCEDURE — 86039 ANTINUCLEAR ANTIBODIES (ANA): CPT

## 2023-01-01 PROCEDURE — 82077 ASSAY SPEC XCP UR&BREATH IA: CPT

## 2023-01-01 PROCEDURE — 86900 BLOOD TYPING SEROLOGIC ABO: CPT

## 2023-01-01 PROCEDURE — 31645 BRNCHSC W/THER ASPIR 1ST: CPT | Performed by: INTERNAL MEDICINE

## 2023-01-01 PROCEDURE — 86923 COMPATIBILITY TEST ELECTRIC: CPT | Mod: 91

## 2023-01-01 PROCEDURE — 93306 TTE W/DOPPLER COMPLETE: CPT | Mod: 26 | Performed by: INTERNAL MEDICINE

## 2023-01-01 PROCEDURE — 93005 ELECTROCARDIOGRAM TRACING: CPT | Performed by: INTERNAL MEDICINE

## 2023-01-01 PROCEDURE — 76705 ECHO EXAM OF ABDOMEN: CPT

## 2023-01-01 PROCEDURE — 85347 COAGULATION TIME ACTIVATED: CPT

## 2023-01-01 PROCEDURE — 4A10X4Z MONITORING OF CENTRAL NERVOUS ELECTRICAL ACTIVITY, EXTERNAL APPROACH: ICD-10-PCS | Performed by: PSYCHIATRY & NEUROLOGY

## 2023-01-01 PROCEDURE — 30233N1 TRANSFUSION OF NONAUTOLOGOUS RED BLOOD CELLS INTO PERIPHERAL VEIN, PERCUTANEOUS APPROACH: ICD-10-PCS | Performed by: INTERNAL MEDICINE

## 2023-01-01 PROCEDURE — 99291 CRITICAL CARE FIRST HOUR: CPT

## 2023-01-01 PROCEDURE — 82010 KETONE BODYS QUAN: CPT

## 2023-01-01 PROCEDURE — 36430 TRANSFUSION BLD/BLD COMPNT: CPT

## 2023-01-01 PROCEDURE — 85025 COMPLETE CBC W/AUTO DIFF WBC: CPT

## 2023-01-01 PROCEDURE — 83550 IRON BINDING TEST: CPT

## 2023-01-01 PROCEDURE — C9113 INJ PANTOPRAZOLE SODIUM, VIA: HCPCS | Performed by: STUDENT IN AN ORGANIZED HEALTH CARE EDUCATION/TRAINING PROGRAM

## 2023-01-01 PROCEDURE — 36415 COLL VENOUS BLD VENIPUNCTURE: CPT

## 2023-01-01 PROCEDURE — 86850 RBC ANTIBODY SCREEN: CPT

## 2023-01-01 PROCEDURE — 82550 ASSAY OF CK (CPK): CPT

## 2023-01-01 PROCEDURE — 302977 HCHG BRONCHOSCOPY PROC-THERAPEUTIC

## 2023-01-01 PROCEDURE — 30243K1 TRANSFUSION OF NONAUTOLOGOUS FROZEN PLASMA INTO CENTRAL VEIN, PERCUTANEOUS APPROACH: ICD-10-PCS | Performed by: INTERNAL MEDICINE

## 2023-01-01 PROCEDURE — 82140 ASSAY OF AMMONIA: CPT

## 2023-01-01 PROCEDURE — 83735 ASSAY OF MAGNESIUM: CPT | Mod: 91

## 2023-01-01 PROCEDURE — 99254 IP/OBS CNSLTJ NEW/EST MOD 60: CPT | Mod: 25 | Performed by: NURSE PRACTITIONER

## 2023-01-01 PROCEDURE — 86704 HEP B CORE ANTIBODY TOTAL: CPT

## 2023-01-01 PROCEDURE — 82728 ASSAY OF FERRITIN: CPT

## 2023-01-01 PROCEDURE — 85027 COMPLETE CBC AUTOMATED: CPT

## 2023-01-01 PROCEDURE — 85018 HEMOGLOBIN: CPT

## 2023-01-01 PROCEDURE — 83036 HEMOGLOBIN GLYCOSYLATED A1C: CPT

## 2023-01-01 PROCEDURE — 02HV33Z INSERTION OF INFUSION DEVICE INTO SUPERIOR VENA CAVA, PERCUTANEOUS APPROACH: ICD-10-PCS | Performed by: INTERNAL MEDICINE

## 2023-01-01 PROCEDURE — 96367 TX/PROPH/DG ADDL SEQ IV INF: CPT

## 2023-01-01 PROCEDURE — 700111 HCHG RX REV CODE 636 W/ 250 OVERRIDE (IP): Performed by: ANESTHESIOLOGY

## 2023-01-01 PROCEDURE — 83540 ASSAY OF IRON: CPT

## 2023-01-01 PROCEDURE — 03HY32Z INSERTION OF MONITORING DEVICE INTO UPPER ARTERY, PERCUTANEOUS APPROACH: ICD-10-PCS | Performed by: INTERNAL MEDICINE

## 2023-01-01 PROCEDURE — 87077 CULTURE AEROBIC IDENTIFY: CPT

## 2023-01-01 PROCEDURE — 85610 PROTHROMBIN TIME: CPT

## 2023-01-01 PROCEDURE — 700117 HCHG RX CONTRAST REV CODE 255: Performed by: INTERNAL MEDICINE

## 2023-01-01 PROCEDURE — 700105 HCHG RX REV CODE 258: Performed by: ANESTHESIOLOGY

## 2023-01-01 PROCEDURE — 87040 BLOOD CULTURE FOR BACTERIA: CPT | Mod: 91

## 2023-01-01 PROCEDURE — 83690 ASSAY OF LIPASE: CPT

## 2023-01-01 PROCEDURE — 80048 BASIC METABOLIC PNL TOTAL CA: CPT

## 2023-01-01 PROCEDURE — 83605 ASSAY OF LACTIC ACID: CPT

## 2023-01-01 PROCEDURE — 80076 HEPATIC FUNCTION PANEL: CPT

## 2023-01-01 PROCEDURE — 700111 HCHG RX REV CODE 636 W/ 250 OVERRIDE (IP): Performed by: STUDENT IN AN ORGANIZED HEALTH CARE EDUCATION/TRAINING PROGRAM

## 2023-01-01 PROCEDURE — 700111 HCHG RX REV CODE 636 W/ 250 OVERRIDE (IP): Mod: JZ,JG | Performed by: INTERNAL MEDICINE

## 2023-01-01 PROCEDURE — 700102 HCHG RX REV CODE 250 W/ 637 OVERRIDE(OP)

## 2023-01-01 PROCEDURE — 99233 SBSQ HOSP IP/OBS HIGH 50: CPT | Performed by: INTERNAL MEDICINE

## 2023-01-01 PROCEDURE — 97602 WOUND(S) CARE NON-SELECTIVE: CPT

## 2023-01-01 PROCEDURE — A9270 NON-COVERED ITEM OR SERVICE: HCPCS

## 2023-01-01 PROCEDURE — 160035 HCHG PACU - 1ST 60 MINS PHASE I: Performed by: INTERNAL MEDICINE

## 2023-01-01 PROCEDURE — 160009 HCHG ANES TIME/MIN: Performed by: INTERNAL MEDICINE

## 2023-01-01 PROCEDURE — 86038 ANTINUCLEAR ANTIBODIES: CPT

## 2023-01-01 PROCEDURE — 31646 BRNCHSC W/THER ASPIR SBSQ: CPT | Performed by: INTERNAL MEDICINE

## 2023-01-01 PROCEDURE — 80048 BASIC METABOLIC PNL TOTAL CA: CPT | Mod: 91

## 2023-01-01 PROCEDURE — 99292 CRITICAL CARE ADDL 30 MIN: CPT | Mod: 25 | Performed by: INTERNAL MEDICINE

## 2023-01-01 PROCEDURE — 36556 INSERT NON-TUNNEL CV CATH: CPT

## 2023-01-01 PROCEDURE — 51702 INSERT TEMP BLADDER CATH: CPT

## 2023-01-01 PROCEDURE — 96365 THER/PROPH/DIAG IV INF INIT: CPT

## 2023-01-01 PROCEDURE — 700102 HCHG RX REV CODE 250 W/ 637 OVERRIDE(OP): Performed by: INTERNAL MEDICINE

## 2023-01-01 PROCEDURE — 5A1945Z RESPIRATORY VENTILATION, 24-96 CONSECUTIVE HOURS: ICD-10-PCS | Performed by: INTERNAL MEDICINE

## 2023-01-01 PROCEDURE — 95822 EEG COMA OR SLEEP ONLY: CPT | Mod: 26 | Performed by: PSYCHIATRY & NEUROLOGY

## 2023-01-01 PROCEDURE — 86316 IMMUNOASSAY TUMOR OTHER: CPT

## 2023-01-01 PROCEDURE — 82248 BILIRUBIN DIRECT: CPT

## 2023-01-01 PROCEDURE — 99232 SBSQ HOSP IP/OBS MODERATE 35: CPT | Performed by: INTERNAL MEDICINE

## 2023-01-01 PROCEDURE — 80074 ACUTE HEPATITIS PANEL: CPT

## 2023-01-01 PROCEDURE — 71045 X-RAY EXAM CHEST 1 VIEW: CPT | Performed by: RADIOLOGY

## 2023-01-01 PROCEDURE — 93306 TTE W/DOPPLER COMPLETE: CPT

## 2023-01-01 PROCEDURE — 86706 HEP B SURFACE ANTIBODY: CPT

## 2023-01-01 PROCEDURE — P9016 RBC LEUKOCYTES REDUCED: HCPCS | Mod: 91

## 2023-01-01 PROCEDURE — 83605 ASSAY OF LACTIC ACID: CPT | Mod: 91

## 2023-01-01 PROCEDURE — HZ2ZZZZ DETOXIFICATION SERVICES FOR SUBSTANCE ABUSE TREATMENT: ICD-10-PCS | Performed by: HOSPITALIST

## 2023-01-01 PROCEDURE — 30243L1 TRANSFUSION OF NONAUTOLOGOUS FRESH PLASMA INTO CENTRAL VEIN, PERCUTANEOUS APPROACH: ICD-10-PCS | Performed by: INTERNAL MEDICINE

## 2023-01-01 PROCEDURE — 86015 ACTIN ANTIBODY EACH: CPT

## 2023-01-01 PROCEDURE — 94002 VENT MGMT INPAT INIT DAY: CPT

## 2023-01-01 PROCEDURE — 93010 ELECTROCARDIOGRAM REPORT: CPT | Mod: 76 | Performed by: INTERNAL MEDICINE

## 2023-01-01 PROCEDURE — 99223 1ST HOSP IP/OBS HIGH 75: CPT | Performed by: STUDENT IN AN ORGANIZED HEALTH CARE EDUCATION/TRAINING PROGRAM

## 2023-01-01 PROCEDURE — 74177 CT ABD & PELVIS W/CONTRAST: CPT

## 2023-01-01 PROCEDURE — 303105 HCHG CATHETER EXTRA

## 2023-01-01 PROCEDURE — 84466 ASSAY OF TRANSFERRIN: CPT

## 2023-01-01 PROCEDURE — 95822 EEG COMA OR SLEEP ONLY: CPT | Performed by: PSYCHIATRY & NEUROLOGY

## 2023-01-01 PROCEDURE — 700111 HCHG RX REV CODE 636 W/ 250 OVERRIDE (IP): Mod: JZ

## 2023-01-01 PROCEDURE — 80061 LIPID PANEL: CPT

## 2023-01-01 PROCEDURE — 30243M1 TRANSFUSION OF NONAUTOLOGOUS PLASMA CRYOPRECIPITATE INTO CENTRAL VEIN, PERCUTANEOUS APPROACH: ICD-10-PCS | Performed by: INTERNAL MEDICINE

## 2023-01-01 PROCEDURE — 82140 ASSAY OF AMMONIA: CPT | Mod: 91

## 2023-01-01 PROCEDURE — 84478 ASSAY OF TRIGLYCERIDES: CPT

## 2023-01-01 PROCEDURE — 302978 HCHG BRONCHOSCOPY-DIAGNOSTIC

## 2023-01-01 PROCEDURE — C1751 CATH, INF, PER/CENT/MIDLINE: HCPCS

## 2023-01-01 PROCEDURE — 06L38CZ OCCLUSION OF ESOPHAGEAL VEIN WITH EXTRALUMINAL DEVICE, VIA NATURAL OR ARTIFICIAL OPENING ENDOSCOPIC: ICD-10-PCS | Performed by: SPECIALIST

## 2023-01-01 PROCEDURE — 99254 IP/OBS CNSLTJ NEW/EST MOD 60: CPT | Mod: 25 | Performed by: INTERNAL MEDICINE

## 2023-01-01 PROCEDURE — 94640 AIRWAY INHALATION TREATMENT: CPT

## 2023-01-01 RX ORDER — ACETAMINOPHEN 650 MG/1
1000 SUPPOSITORY RECTAL EVERY 6 HOURS
Status: DISCONTINUED | OUTPATIENT
Start: 2023-01-01 | End: 2023-01-01

## 2023-01-01 RX ORDER — PROMETHAZINE HYDROCHLORIDE 12.5 MG/1
12.5-25 SUPPOSITORY RECTAL EVERY 4 HOURS PRN
Status: DISCONTINUED | OUTPATIENT
Start: 2023-01-01 | End: 2023-01-01 | Stop reason: HOSPADM

## 2023-01-01 RX ORDER — PROPOFOL 10 MG/ML
200 INJECTION, EMULSION INTRAVENOUS ONCE
Status: DISCONTINUED | OUTPATIENT
Start: 2023-01-01 | End: 2023-01-01

## 2023-01-01 RX ORDER — DIPHENHYDRAMINE HYDROCHLORIDE 50 MG/ML
12.5 INJECTION INTRAMUSCULAR; INTRAVENOUS
Status: DISCONTINUED | OUTPATIENT
Start: 2023-01-01 | End: 2023-01-01 | Stop reason: HOSPADM

## 2023-01-01 RX ORDER — CALCIUM GLUCONATE 20 MG/ML
2 INJECTION, SOLUTION INTRAVENOUS ONCE
Status: COMPLETED | OUTPATIENT
Start: 2023-01-01 | End: 2023-01-01

## 2023-01-01 RX ORDER — LORAZEPAM 2 MG/1
2 TABLET ORAL
Status: DISCONTINUED | OUTPATIENT
Start: 2023-01-01 | End: 2023-01-01 | Stop reason: HOSPADM

## 2023-01-01 RX ORDER — DEXTROSE MONOHYDRATE 25 G/50ML
50 INJECTION, SOLUTION INTRAVENOUS ONCE
Status: COMPLETED | OUTPATIENT
Start: 2023-01-01 | End: 2023-01-01

## 2023-01-01 RX ORDER — GAUZE BANDAGE 2" X 2"
100 BANDAGE TOPICAL DAILY
Status: DISCONTINUED | OUTPATIENT
Start: 2023-01-01 | End: 2023-01-01

## 2023-01-01 RX ORDER — LORAZEPAM 2 MG/1
4 TABLET ORAL
Status: DISCONTINUED | OUTPATIENT
Start: 2023-01-01 | End: 2023-01-01 | Stop reason: HOSPADM

## 2023-01-01 RX ORDER — ONDANSETRON 2 MG/ML
4 INJECTION INTRAMUSCULAR; INTRAVENOUS
Status: DISCONTINUED | OUTPATIENT
Start: 2023-01-01 | End: 2023-01-01 | Stop reason: HOSPADM

## 2023-01-01 RX ORDER — POLYETHYLENE GLYCOL 3350 17 G/17G
1 POWDER, FOR SOLUTION ORAL
Status: DISCONTINUED | OUTPATIENT
Start: 2023-01-01 | End: 2023-01-01

## 2023-01-01 RX ORDER — OXYCODONE HYDROCHLORIDE 10 MG/1
10 TABLET ORAL
Status: DISCONTINUED | OUTPATIENT
Start: 2023-01-01 | End: 2023-01-01 | Stop reason: HOSPADM

## 2023-01-01 RX ORDER — BUSPIRONE HYDROCHLORIDE 10 MG/1
15 TABLET ORAL 2 TIMES DAILY
Status: DISCONTINUED | OUTPATIENT
Start: 2023-01-01 | End: 2023-01-01

## 2023-01-01 RX ORDER — PROPOFOL 10 MG/ML
50 INJECTION, EMULSION INTRAVENOUS ONCE
Status: COMPLETED | OUTPATIENT
Start: 2023-01-01 | End: 2023-01-01

## 2023-01-01 RX ORDER — LORAZEPAM 1 MG/1
1 TABLET ORAL EVERY 4 HOURS PRN
Status: DISCONTINUED | OUTPATIENT
Start: 2023-01-01 | End: 2023-01-01 | Stop reason: HOSPADM

## 2023-01-01 RX ORDER — DEXTROSE MONOHYDRATE 25 G/50ML
INJECTION, SOLUTION INTRAVENOUS
Status: COMPLETED
Start: 2023-01-01 | End: 2023-01-01

## 2023-01-01 RX ORDER — MAGNESIUM SULFATE HEPTAHYDRATE 40 MG/ML
2 INJECTION, SOLUTION INTRAVENOUS ONCE
Status: COMPLETED | OUTPATIENT
Start: 2023-01-01 | End: 2023-01-01

## 2023-01-01 RX ORDER — DIAZEPAM 5 MG/ML
5-10 INJECTION, SOLUTION INTRAMUSCULAR; INTRAVENOUS
Status: DISCONTINUED | OUTPATIENT
Start: 2023-01-01 | End: 2024-01-01 | Stop reason: HOSPADM

## 2023-01-01 RX ORDER — DEXTROSE MONOHYDRATE 100 MG/ML
INJECTION, SOLUTION INTRAVENOUS CONTINUOUS
Status: DISCONTINUED | OUTPATIENT
Start: 2023-01-01 | End: 2023-01-01

## 2023-01-01 RX ORDER — ACETAMINOPHEN 325 MG/1
650 TABLET ORAL EVERY 6 HOURS PRN
Status: ACTIVE | OUTPATIENT
Start: 2023-01-01 | End: 2023-01-01

## 2023-01-01 RX ORDER — EPINEPHRINE HCL IN 0.9 % NACL 4MG/250ML
0-.5 PLASTIC BAG, INJECTION (ML) INTRAVENOUS CONTINUOUS
Status: DISCONTINUED | OUTPATIENT
Start: 2023-01-01 | End: 2023-01-01

## 2023-01-01 RX ORDER — METHYLPREDNISOLONE SODIUM SUCCINATE 40 MG/ML
40 INJECTION, POWDER, LYOPHILIZED, FOR SOLUTION INTRAMUSCULAR; INTRAVENOUS DAILY
Status: DISCONTINUED | OUTPATIENT
Start: 2023-01-01 | End: 2023-01-01

## 2023-01-01 RX ORDER — CALCIUM CHLORIDE 100 MG/ML
1 INJECTION INTRAVENOUS; INTRAVENTRICULAR ONCE
Status: COMPLETED | OUTPATIENT
Start: 2023-01-01 | End: 2023-01-01

## 2023-01-01 RX ORDER — OMEPRAZOLE 40 MG/1
40 CAPSULE, DELAYED RELEASE ORAL DAILY
Qty: 30 CAPSULE | Refills: 0 | Status: SHIPPED | OUTPATIENT
Start: 2023-01-01 | End: 2024-01-01

## 2023-01-01 RX ORDER — DEXMEDETOMIDINE HYDROCHLORIDE 4 UG/ML
.1-1.5 INJECTION, SOLUTION INTRAVENOUS CONTINUOUS
Status: DISCONTINUED | OUTPATIENT
Start: 2023-01-01 | End: 2023-01-01

## 2023-01-01 RX ORDER — FOLIC ACID 1 MG/1
1 TABLET ORAL DAILY
Status: DISCONTINUED | OUTPATIENT
Start: 2023-01-01 | End: 2023-01-01

## 2023-01-01 RX ORDER — PANTOPRAZOLE SODIUM 40 MG/10ML
40 INJECTION, POWDER, LYOPHILIZED, FOR SOLUTION INTRAVENOUS 2 TIMES DAILY
Status: DISCONTINUED | OUTPATIENT
Start: 2023-01-01 | End: 2023-01-01 | Stop reason: HOSPADM

## 2023-01-01 RX ORDER — GAUZE BANDAGE 2" X 2"
100 BANDAGE TOPICAL DAILY
Status: COMPLETED | OUTPATIENT
Start: 2023-01-01 | End: 2023-01-01

## 2023-01-01 RX ORDER — PHENYLEPHRINE HCL IN 0.9% NACL 0.5 MG/5ML
100-300 SYRINGE (ML) INTRAVENOUS
Status: ACTIVE | OUTPATIENT
Start: 2023-01-01 | End: 2023-01-01

## 2023-01-01 RX ORDER — DIAZEPAM 5 MG/ML
10 INJECTION, SOLUTION INTRAMUSCULAR; INTRAVENOUS
Status: DISCONTINUED | OUTPATIENT
Start: 2023-01-01 | End: 2023-01-01 | Stop reason: HOSPADM

## 2023-01-01 RX ORDER — PROPOFOL 10 MG/ML
30 INJECTION, EMULSION INTRAVENOUS ONCE
Status: COMPLETED | OUTPATIENT
Start: 2023-01-01 | End: 2023-01-01

## 2023-01-01 RX ORDER — MIDAZOLAM HYDROCHLORIDE 1 MG/ML
5 INJECTION INTRAMUSCULAR; INTRAVENOUS ONCE
Status: COMPLETED | OUTPATIENT
Start: 2023-01-01 | End: 2023-01-01

## 2023-01-01 RX ORDER — LACTULOSE 10 G/15ML
300 SOLUTION ORAL EVERY 6 HOURS
Status: DISCONTINUED | OUTPATIENT
Start: 2023-01-01 | End: 2023-01-01

## 2023-01-01 RX ORDER — SODIUM CHLORIDE, SODIUM LACTATE, POTASSIUM CHLORIDE, AND CALCIUM CHLORIDE .6; .31; .03; .02 G/100ML; G/100ML; G/100ML; G/100ML
500 INJECTION, SOLUTION INTRAVENOUS ONCE
Status: COMPLETED | OUTPATIENT
Start: 2023-01-01 | End: 2023-01-01

## 2023-01-01 RX ORDER — NOREPINEPHRINE BITARTRATE 0.03 MG/ML
0-1 INJECTION, SOLUTION INTRAVENOUS CONTINUOUS
Status: DISCONTINUED | OUTPATIENT
Start: 2023-01-01 | End: 2023-01-01

## 2023-01-01 RX ORDER — PHENYLEPHRINE HCL IN 0.9% NACL 0.5 MG/5ML
SYRINGE (ML) INTRAVENOUS
Status: ACTIVE
Start: 2023-01-01 | End: 2023-01-01

## 2023-01-01 RX ORDER — CALCIUM CHLORIDE 100 MG/ML
INJECTION INTRAVENOUS; INTRAVENTRICULAR
Status: ACTIVE
Start: 2023-01-01 | End: 2023-01-01

## 2023-01-01 RX ORDER — SODIUM BICARBONATE IN D5W 150/1000ML
PLASTIC BAG, INJECTION (ML) INTRAVENOUS CONTINUOUS
Status: DISCONTINUED | OUTPATIENT
Start: 2023-01-01 | End: 2023-01-01 | Stop reason: ALTCHOICE

## 2023-01-01 RX ORDER — FAMOTIDINE 20 MG/1
20 TABLET, FILM COATED ORAL EVERY 12 HOURS
Status: DISCONTINUED | OUTPATIENT
Start: 2023-01-01 | End: 2023-01-01

## 2023-01-01 RX ORDER — PROPRANOLOL HYDROCHLORIDE 10 MG/1
10 TABLET ORAL 2 TIMES DAILY
Qty: 120 TABLET | Refills: 0 | Status: SHIPPED | OUTPATIENT
Start: 2023-01-01 | End: 2024-01-01

## 2023-01-01 RX ORDER — EPINEPHRINE 0.1 MG/ML
1 SYRINGE (ML) INJECTION ONCE
Status: COMPLETED | OUTPATIENT
Start: 2023-01-01 | End: 2023-01-01

## 2023-01-01 RX ORDER — METRONIDAZOLE 500 MG/100ML
500 INJECTION, SOLUTION INTRAVENOUS EVERY 12 HOURS
Status: DISCONTINUED | OUTPATIENT
Start: 2023-01-01 | End: 2023-01-01

## 2023-01-01 RX ORDER — BISACODYL 10 MG
10 SUPPOSITORY, RECTAL RECTAL
Status: DISCONTINUED | OUTPATIENT
Start: 2023-01-01 | End: 2023-01-01 | Stop reason: HOSPADM

## 2023-01-01 RX ORDER — PROCHLORPERAZINE EDISYLATE 5 MG/ML
5-10 INJECTION INTRAMUSCULAR; INTRAVENOUS EVERY 4 HOURS PRN
Status: DISCONTINUED | OUTPATIENT
Start: 2023-01-01 | End: 2023-01-01 | Stop reason: HOSPADM

## 2023-01-01 RX ORDER — SODIUM CHLORIDE, SODIUM LACTATE, POTASSIUM CHLORIDE, AND CALCIUM CHLORIDE .6; .31; .03; .02 G/100ML; G/100ML; G/100ML; G/100ML
1000 INJECTION, SOLUTION INTRAVENOUS ONCE
Status: COMPLETED | OUTPATIENT
Start: 2023-01-01 | End: 2023-01-01

## 2023-01-01 RX ORDER — AMOXICILLIN 250 MG
2 CAPSULE ORAL 2 TIMES DAILY
Status: DISCONTINUED | OUTPATIENT
Start: 2023-01-01 | End: 2023-01-01 | Stop reason: HOSPADM

## 2023-01-01 RX ORDER — ROCURONIUM BROMIDE 10 MG/ML
100 INJECTION, SOLUTION INTRAVENOUS ONCE
Status: COMPLETED | OUTPATIENT
Start: 2023-01-01 | End: 2023-01-01

## 2023-01-01 RX ORDER — SODIUM CHLORIDE, SODIUM LACTATE, POTASSIUM CHLORIDE, CALCIUM CHLORIDE 600; 310; 30; 20 MG/100ML; MG/100ML; MG/100ML; MG/100ML
INJECTION, SOLUTION INTRAVENOUS
Status: DISCONTINUED | OUTPATIENT
Start: 2023-01-01 | End: 2023-01-01 | Stop reason: SURG

## 2023-01-01 RX ORDER — MIDAZOLAM HYDROCHLORIDE 1 MG/ML
1-5 INJECTION INTRAMUSCULAR; INTRAVENOUS ONCE
Status: COMPLETED | OUTPATIENT
Start: 2023-01-01 | End: 2023-01-01

## 2023-01-01 RX ORDER — DEXMEDETOMIDINE HYDROCHLORIDE 4 UG/ML
0-1.5 INJECTION, SOLUTION INTRAVENOUS CONTINUOUS
Status: DISCONTINUED | OUTPATIENT
Start: 2023-01-01 | End: 2023-01-01

## 2023-01-01 RX ORDER — SODIUM CHLORIDE, SODIUM LACTATE, POTASSIUM CHLORIDE, CALCIUM CHLORIDE 600; 310; 30; 20 MG/100ML; MG/100ML; MG/100ML; MG/100ML
INJECTION, SOLUTION INTRAVENOUS CONTINUOUS
Status: DISCONTINUED | OUTPATIENT
Start: 2023-01-01 | End: 2023-01-01 | Stop reason: HOSPADM

## 2023-01-01 RX ORDER — MORPHINE SULFATE 4 MG/ML
4 INJECTION INTRAVENOUS
Status: DISCONTINUED | OUTPATIENT
Start: 2023-01-01 | End: 2023-01-01 | Stop reason: HOSPADM

## 2023-01-01 RX ORDER — MORPHINE SULFATE 4 MG/ML
1-10 INJECTION INTRAVENOUS
Status: DISCONTINUED | OUTPATIENT
Start: 2023-01-01 | End: 2024-01-01 | Stop reason: HOSPADM

## 2023-01-01 RX ORDER — LABETALOL HYDROCHLORIDE 5 MG/ML
10 INJECTION, SOLUTION INTRAVENOUS EVERY 4 HOURS PRN
Status: ACTIVE | OUTPATIENT
Start: 2023-01-01 | End: 2023-01-01

## 2023-01-01 RX ORDER — ONDANSETRON 2 MG/ML
4 INJECTION INTRAMUSCULAR; INTRAVENOUS EVERY 4 HOURS PRN
Status: DISCONTINUED | OUTPATIENT
Start: 2023-01-01 | End: 2023-01-01

## 2023-01-01 RX ORDER — AMOXICILLIN 250 MG
2 CAPSULE ORAL 2 TIMES DAILY
Status: DISCONTINUED | OUTPATIENT
Start: 2023-01-01 | End: 2023-01-01

## 2023-01-01 RX ORDER — ATROPINE SULFATE 10 MG/ML
2 SOLUTION/ DROPS OPHTHALMIC EVERY 4 HOURS PRN
Status: DISCONTINUED | OUTPATIENT
Start: 2023-01-01 | End: 2024-01-01 | Stop reason: HOSPADM

## 2023-01-01 RX ORDER — ONDANSETRON 4 MG/1
4 TABLET, ORALLY DISINTEGRATING ORAL EVERY 4 HOURS PRN
Status: DISCONTINUED | OUTPATIENT
Start: 2023-01-01 | End: 2023-01-01 | Stop reason: HOSPADM

## 2023-01-01 RX ORDER — POLYETHYLENE GLYCOL 3350 17 G/17G
1 POWDER, FOR SOLUTION ORAL
Status: DISCONTINUED | OUTPATIENT
Start: 2023-01-01 | End: 2023-01-01 | Stop reason: HOSPADM

## 2023-01-01 RX ORDER — ROCURONIUM BROMIDE 10 MG/ML
50 INJECTION, SOLUTION INTRAVENOUS ONCE
Status: COMPLETED | OUTPATIENT
Start: 2023-01-01 | End: 2023-01-01

## 2023-01-01 RX ORDER — HALOPERIDOL 5 MG/ML
1 INJECTION INTRAMUSCULAR
Status: DISCONTINUED | OUTPATIENT
Start: 2023-01-01 | End: 2023-01-01 | Stop reason: HOSPADM

## 2023-01-01 RX ORDER — PROPRANOLOL HYDROCHLORIDE 10 MG/1
10 TABLET ORAL TWICE DAILY
Status: DISCONTINUED | OUTPATIENT
Start: 2023-01-01 | End: 2023-01-01 | Stop reason: HOSPADM

## 2023-01-01 RX ORDER — ROCURONIUM BROMIDE 10 MG/ML
INJECTION, SOLUTION INTRAVENOUS
Status: ACTIVE
Start: 2023-01-01 | End: 2023-01-01

## 2023-01-01 RX ORDER — OXYCODONE HYDROCHLORIDE 5 MG/1
5 TABLET ORAL
Status: DISCONTINUED | OUTPATIENT
Start: 2023-01-01 | End: 2023-01-01 | Stop reason: HOSPADM

## 2023-01-01 RX ORDER — FUROSEMIDE 10 MG/ML
100 INJECTION INTRAMUSCULAR; INTRAVENOUS ONCE
Status: COMPLETED | OUTPATIENT
Start: 2023-01-01 | End: 2023-01-01

## 2023-01-01 RX ORDER — BISACODYL 10 MG
10 SUPPOSITORY, RECTAL RECTAL
Status: DISCONTINUED | OUTPATIENT
Start: 2023-01-01 | End: 2023-01-01

## 2023-01-01 RX ORDER — LORAZEPAM 0.5 MG/1
0.5 TABLET ORAL EVERY 4 HOURS PRN
Status: DISCONTINUED | OUTPATIENT
Start: 2023-01-01 | End: 2023-01-01 | Stop reason: HOSPADM

## 2023-01-01 RX ORDER — PROMETHAZINE HYDROCHLORIDE 25 MG/1
12.5-25 TABLET ORAL EVERY 4 HOURS PRN
Status: DISCONTINUED | OUTPATIENT
Start: 2023-01-01 | End: 2023-01-01 | Stop reason: HOSPADM

## 2023-01-01 RX ORDER — ALBUMIN (HUMAN) 12.5 G/50ML
100 SOLUTION INTRAVENOUS ONCE
Status: COMPLETED | OUTPATIENT
Start: 2023-01-01 | End: 2023-01-01

## 2023-01-01 RX ORDER — MIDAZOLAM HYDROCHLORIDE 1 MG/ML
5 INJECTION INTRAMUSCULAR; INTRAVENOUS ONCE
Status: DISCONTINUED | OUTPATIENT
Start: 2023-01-01 | End: 2023-01-01

## 2023-01-01 RX ORDER — CIPROFLOXACIN 500 MG/1
500 TABLET, FILM COATED ORAL 2 TIMES DAILY
Qty: 8 TABLET | Refills: 0 | Status: ACTIVE | OUTPATIENT
Start: 2023-01-01 | End: 2023-01-01

## 2023-01-01 RX ORDER — CEFTRIAXONE 1 G/1
1000 INJECTION, POWDER, FOR SOLUTION INTRAMUSCULAR; INTRAVENOUS ONCE
Status: COMPLETED | OUTPATIENT
Start: 2023-01-01 | End: 2023-01-01

## 2023-01-01 RX ORDER — ACETAMINOPHEN 500 MG
1000 TABLET ORAL EVERY 6 HOURS
Status: DISCONTINUED | OUTPATIENT
Start: 2023-01-01 | End: 2023-01-01

## 2023-01-01 RX ORDER — ONDANSETRON 2 MG/ML
4 INJECTION INTRAMUSCULAR; INTRAVENOUS EVERY 4 HOURS PRN
Status: DISCONTINUED | OUTPATIENT
Start: 2023-01-01 | End: 2023-01-01 | Stop reason: HOSPADM

## 2023-01-01 RX ORDER — FOLIC ACID 1 MG/1
1 TABLET ORAL DAILY
Status: COMPLETED | OUTPATIENT
Start: 2023-01-01 | End: 2023-01-01

## 2023-01-01 RX ORDER — SODIUM CHLORIDE 9 MG/ML
INJECTION, SOLUTION INTRAVENOUS CONTINUOUS
Status: ACTIVE | OUTPATIENT
Start: 2023-01-01 | End: 2023-01-01

## 2023-01-01 RX ORDER — MIDAZOLAM HYDROCHLORIDE 1 MG/ML
INJECTION INTRAMUSCULAR; INTRAVENOUS
Status: ACTIVE
Start: 2023-01-01 | End: 2023-01-01

## 2023-01-01 RX ADMIN — HUMAN ALBUMIN MICROSPHERES AND PERFLUTREN 3 ML: 10; .22 INJECTION, SOLUTION INTRAVENOUS at 18:00

## 2023-01-01 RX ADMIN — CEFTRIAXONE SODIUM 1000 MG: 10 INJECTION, POWDER, FOR SOLUTION INTRAVENOUS at 05:08

## 2023-01-01 RX ADMIN — CEFTRIAXONE SODIUM 1000 MG: 10 INJECTION, POWDER, FOR SOLUTION INTRAVENOUS at 07:36

## 2023-01-01 RX ADMIN — LORAZEPAM 1 MG: 1 TABLET ORAL at 03:40

## 2023-01-01 RX ADMIN — ONDANSETRON 4 MG: 2 INJECTION INTRAMUSCULAR; INTRAVENOUS at 09:03

## 2023-01-01 RX ADMIN — FOLIC ACID 1 MG: 1 TABLET ORAL at 05:26

## 2023-01-01 RX ADMIN — SODIUM CHLORIDE, POTASSIUM CHLORIDE, SODIUM LACTATE AND CALCIUM CHLORIDE 500 ML: 600; 310; 30; 20 INJECTION, SOLUTION INTRAVENOUS at 15:00

## 2023-01-01 RX ADMIN — THIAMINE HYDROCHLORIDE 250 MG: 100 INJECTION, SOLUTION INTRAMUSCULAR; INTRAVENOUS at 21:18

## 2023-01-01 RX ADMIN — DEXTROSE MONOHYDRATE: 100 INJECTION, SOLUTION INTRAVENOUS at 06:36

## 2023-01-01 RX ADMIN — NOREPINEPHRINE BITARTRATE 0.05 MCG/KG/MIN: 1 INJECTION INTRAVENOUS at 13:54

## 2023-01-01 RX ADMIN — DOCUSATE SODIUM 50 MG AND SENNOSIDES 8.6 MG 2 TABLET: 8.6; 5 TABLET, FILM COATED ORAL at 06:21

## 2023-01-01 RX ADMIN — FENTANYL CITRATE 100 MCG: 50 INJECTION, SOLUTION INTRAMUSCULAR; INTRAVENOUS at 05:46

## 2023-01-01 RX ADMIN — FOLIC ACID 1 MG: 1 TABLET ORAL at 06:21

## 2023-01-01 RX ADMIN — DEXTROSE MONOHYDRATE 50 ML: 25 INJECTION, SOLUTION INTRAVENOUS at 05:28

## 2023-01-01 RX ADMIN — Medication 200 MCG/HR: at 19:18

## 2023-01-01 RX ADMIN — SODIUM CHLORIDE, POTASSIUM CHLORIDE, SODIUM LACTATE AND CALCIUM CHLORIDE 1000 ML: 600; 310; 30; 20 INJECTION, SOLUTION INTRAVENOUS at 09:00

## 2023-01-01 RX ADMIN — ALBUMIN (HUMAN) 100 G: 12.5 SOLUTION INTRAVENOUS at 09:40

## 2023-01-01 RX ADMIN — NOREPINEPHRINE BITARTRATE 0.7 MCG/KG/MIN: 1 INJECTION, SOLUTION, CONCENTRATE INTRAVENOUS at 09:28

## 2023-01-01 RX ADMIN — SODIUM BICARBONATE: 84 INJECTION, SOLUTION INTRAVENOUS at 02:39

## 2023-01-01 RX ADMIN — THIAMINE HYDROCHLORIDE 250 MG: 100 INJECTION, SOLUTION INTRAMUSCULAR; INTRAVENOUS at 08:15

## 2023-01-01 RX ADMIN — OCTREOTIDE ACETATE 50 MCG/HR: 200 INJECTION, SOLUTION INTRAVENOUS; SUBCUTANEOUS at 08:43

## 2023-01-01 RX ADMIN — MIDAZOLAM 6 MG/HR: 5 INJECTION, SOLUTION INTRAMUSCULAR; INTRAVENOUS at 00:41

## 2023-01-01 RX ADMIN — DEXTROSE MONOHYDRATE 25 G: 100 INJECTION, SOLUTION INTRAVENOUS at 11:49

## 2023-01-01 RX ADMIN — THIAMINE HYDROCHLORIDE 250 MG: 100 INJECTION, SOLUTION INTRAMUSCULAR; INTRAVENOUS at 14:34

## 2023-01-01 RX ADMIN — VASOPRESSIN 0.03 UNITS/MIN: 20 INJECTION INTRAVENOUS at 03:31

## 2023-01-01 RX ADMIN — THIAMINE HYDROCHLORIDE 250 MG: 100 INJECTION, SOLUTION INTRAMUSCULAR; INTRAVENOUS at 22:15

## 2023-01-01 RX ADMIN — OCTREOTIDE ACETATE 50 MCG/HR: 200 INJECTION, SOLUTION INTRAVENOUS; SUBCUTANEOUS at 15:36

## 2023-01-01 RX ADMIN — PANTOPRAZOLE SODIUM 8 MG/HR: 40 INJECTION, POWDER, FOR SOLUTION INTRAVENOUS at 07:20

## 2023-01-01 RX ADMIN — PROPRANOLOL HYDROCHLORIDE 10 MG: 10 TABLET ORAL at 17:48

## 2023-01-01 RX ADMIN — PANTOPRAZOLE SODIUM 8 MG/HR: 40 INJECTION, POWDER, FOR SOLUTION INTRAVENOUS at 04:26

## 2023-01-01 RX ADMIN — PHYTONADIONE 10 MG: 10 INJECTION, EMULSION INTRAMUSCULAR; INTRAVENOUS; SUBCUTANEOUS at 09:46

## 2023-01-01 RX ADMIN — Medication 100 MG: at 05:19

## 2023-01-01 RX ADMIN — ONDANSETRON 4 MG: 2 INJECTION INTRAMUSCULAR; INTRAVENOUS at 05:20

## 2023-01-01 RX ADMIN — THERA TABS 1 TABLET: TAB at 05:19

## 2023-01-01 RX ADMIN — Medication 100 MG: at 05:26

## 2023-01-01 RX ADMIN — VASOPRESSIN 0.03 UNITS/MIN: 20 INJECTION INTRAVENOUS at 14:31

## 2023-01-01 RX ADMIN — SODIUM BICARBONATE 100 MEQ: 84 INJECTION, SOLUTION INTRAVENOUS at 11:15

## 2023-01-01 RX ADMIN — SODIUM BICARBONATE 50 MEQ: 84 INJECTION, SOLUTION INTRAVENOUS at 10:29

## 2023-01-01 RX ADMIN — THIAMINE HYDROCHLORIDE: 100 INJECTION, SOLUTION INTRAMUSCULAR; INTRAVENOUS at 23:22

## 2023-01-01 RX ADMIN — DOCUSATE SODIUM 50 MG AND SENNOSIDES 8.6 MG 2 TABLET: 8.6; 5 TABLET, FILM COATED ORAL at 05:19

## 2023-01-01 RX ADMIN — THIAMINE HYDROCHLORIDE 250 MG: 100 INJECTION, SOLUTION INTRAMUSCULAR; INTRAVENOUS at 05:40

## 2023-01-01 RX ADMIN — NOREPINEPHRINE BITARTRATE 0.75 MCG/KG/MIN: 1 INJECTION INTRAVENOUS at 04:23

## 2023-01-01 RX ADMIN — SODIUM BICARBONATE 50 MEQ: 84 INJECTION, SOLUTION INTRAVENOUS at 08:58

## 2023-01-01 RX ADMIN — THIAMINE HYDROCHLORIDE 250 MG: 100 INJECTION, SOLUTION INTRAMUSCULAR; INTRAVENOUS at 14:12

## 2023-01-01 RX ADMIN — PROPRANOLOL HYDROCHLORIDE 10 MG: 10 TABLET ORAL at 05:26

## 2023-01-01 RX ADMIN — MIDAZOLAM HYDROCHLORIDE 5 MG: 1 INJECTION, SOLUTION INTRAMUSCULAR; INTRAVENOUS at 09:05

## 2023-01-01 RX ADMIN — CEFTRIAXONE SODIUM 2000 MG: 10 INJECTION, POWDER, FOR SOLUTION INTRAVENOUS at 05:28

## 2023-01-01 RX ADMIN — CEFTRIAXONE SODIUM 1000 MG: 10 INJECTION, POWDER, FOR SOLUTION INTRAVENOUS at 06:11

## 2023-01-01 RX ADMIN — EPINEPHRINE 1 MG: 0.1 INJECTION INTRAVENOUS at 10:28

## 2023-01-01 RX ADMIN — PHYTONADIONE 10 MG: 10 INJECTION, EMULSION INTRAMUSCULAR; INTRAVENOUS; SUBCUTANEOUS at 04:04

## 2023-01-01 RX ADMIN — ONDANSETRON 4 MG: 2 INJECTION INTRAMUSCULAR; INTRAVENOUS at 18:05

## 2023-01-01 RX ADMIN — LORAZEPAM 0.5 MG: 0.5 TABLET ORAL at 15:04

## 2023-01-01 RX ADMIN — ATROPINE SULFATE 1 MG: 0.1 INJECTION INTRAVENOUS at 10:28

## 2023-01-01 RX ADMIN — PANTOPRAZOLE SODIUM 40 MG: 40 INJECTION, POWDER, FOR SOLUTION INTRAVENOUS at 17:00

## 2023-01-01 RX ADMIN — METRONIDAZOLE 500 MG: 500 INJECTION, SOLUTION INTRAVENOUS at 17:31

## 2023-01-01 RX ADMIN — THIAMINE HYDROCHLORIDE 250 MG: 100 INJECTION, SOLUTION INTRAMUSCULAR; INTRAVENOUS at 14:29

## 2023-01-01 RX ADMIN — PANTOPRAZOLE SODIUM 40 MG: 40 INJECTION, POWDER, FOR SOLUTION INTRAVENOUS at 16:09

## 2023-01-01 RX ADMIN — METHYLPREDNISOLONE SODIUM SUCCINATE 40 MG: 40 INJECTION, POWDER, FOR SOLUTION INTRAMUSCULAR; INTRAVENOUS at 05:26

## 2023-01-01 RX ADMIN — PROPOFOL 30 MG: 10 INJECTION, EMULSION INTRAVENOUS at 09:38

## 2023-01-01 RX ADMIN — PROCHLORPERAZINE EDISYLATE 10 MG: 5 INJECTION INTRAMUSCULAR; INTRAVENOUS at 23:18

## 2023-01-01 RX ADMIN — ROCURONIUM BROMIDE 100 MG: 50 INJECTION, SOLUTION INTRAVENOUS at 17:41

## 2023-01-01 RX ADMIN — MIDAZOLAM 7 MG/HR: 5 INJECTION, SOLUTION INTRAMUSCULAR; INTRAVENOUS at 11:09

## 2023-01-01 RX ADMIN — OXYCODONE HYDROCHLORIDE 10 MG: 10 TABLET ORAL at 01:08

## 2023-01-01 RX ADMIN — OXYCODONE HYDROCHLORIDE 5 MG: 5 TABLET ORAL at 16:02

## 2023-01-01 RX ADMIN — DEXTROSE MONOHYDRATE: 100 INJECTION, SOLUTION INTRAVENOUS at 05:49

## 2023-01-01 RX ADMIN — EPINEPHRINE 0.03 MCG/KG/MIN: 1 INJECTION INTRAMUSCULAR; INTRAVENOUS; SUBCUTANEOUS at 08:25

## 2023-01-01 RX ADMIN — NOREPINEPHRINE BITARTRATE 0.8 MCG/KG/MIN: 1 INJECTION INTRAVENOUS at 23:14

## 2023-01-01 RX ADMIN — PANTOPRAZOLE SODIUM 8 MG/HR: 40 INJECTION, POWDER, FOR SOLUTION INTRAVENOUS at 10:47

## 2023-01-01 RX ADMIN — LORAZEPAM 0.5 MG: 0.5 TABLET ORAL at 11:35

## 2023-01-01 RX ADMIN — FOLIC ACID 1 MG: 1 TABLET ORAL at 05:19

## 2023-01-01 RX ADMIN — PANTOPRAZOLE SODIUM 8 MG/HR: 40 INJECTION, POWDER, FOR SOLUTION INTRAVENOUS at 00:22

## 2023-01-01 RX ADMIN — CEFTRIAXONE SODIUM 1000 MG: 1 INJECTION, POWDER, FOR SOLUTION INTRAMUSCULAR; INTRAVENOUS at 04:55

## 2023-01-01 RX ADMIN — FENTANYL CITRATE 100 MCG: 50 INJECTION, SOLUTION INTRAMUSCULAR; INTRAVENOUS at 05:01

## 2023-01-01 RX ADMIN — PANTOPRAZOLE SODIUM 40 MG: 40 INJECTION, POWDER, FOR SOLUTION INTRAVENOUS at 05:19

## 2023-01-01 RX ADMIN — Medication 100 MCG/HR: at 16:39

## 2023-01-01 RX ADMIN — NOREPINEPHRINE BITARTRATE 0.42 MCG/KG/MIN: 1 INJECTION INTRAVENOUS at 10:36

## 2023-01-01 RX ADMIN — PANTOPRAZOLE SODIUM 40 MG: 40 INJECTION, POWDER, FOR SOLUTION INTRAVENOUS at 06:21

## 2023-01-01 RX ADMIN — PROPOFOL 50 MG: 10 INJECTION, EMULSION INTRAVENOUS at 10:26

## 2023-01-01 RX ADMIN — MORPHINE SULFATE 8 MG: 4 INJECTION, SOLUTION INTRAMUSCULAR; INTRAVENOUS at 20:19

## 2023-01-01 RX ADMIN — OCTREOTIDE ACETATE 50 MCG/HR: 200 INJECTION, SOLUTION INTRAVENOUS; SUBCUTANEOUS at 11:18

## 2023-01-01 RX ADMIN — DEXTROSE MONOHYDRATE: 100 INJECTION, SOLUTION INTRAVENOUS at 15:01

## 2023-01-01 RX ADMIN — PROPRANOLOL HYDROCHLORIDE 10 MG: 10 TABLET ORAL at 08:37

## 2023-01-01 RX ADMIN — NOREPINEPHRINE BITARTRATE 0.7 MCG/KG/MIN: 1 INJECTION, SOLUTION, CONCENTRATE INTRAVENOUS at 21:20

## 2023-01-01 RX ADMIN — PANTOPRAZOLE SODIUM 8 MG/HR: 40 INJECTION, POWDER, FOR SOLUTION INTRAVENOUS at 17:28

## 2023-01-01 RX ADMIN — PROPRANOLOL HYDROCHLORIDE 10 MG: 10 TABLET ORAL at 05:19

## 2023-01-01 RX ADMIN — DOCUSATE SODIUM 50 MG AND SENNOSIDES 8.6 MG 2 TABLET: 8.6; 5 TABLET, FILM COATED ORAL at 17:48

## 2023-01-01 RX ADMIN — SODIUM BICARBONATE 50 MEQ: 84 INJECTION, SOLUTION INTRAVENOUS at 08:20

## 2023-01-01 RX ADMIN — DEXMEDETOMIDINE HYDROCHLORIDE 0.2 MCG/KG/HR: 100 INJECTION, SOLUTION INTRAVENOUS at 00:45

## 2023-01-01 RX ADMIN — METHYLPREDNISOLONE SODIUM SUCCINATE 40 MG: 40 INJECTION, POWDER, FOR SOLUTION INTRAMUSCULAR; INTRAVENOUS at 05:07

## 2023-01-01 RX ADMIN — METHYLPREDNISOLONE SODIUM SUCCINATE 40 MG: 40 INJECTION, POWDER, FOR SOLUTION INTRAMUSCULAR; INTRAVENOUS at 05:35

## 2023-01-01 RX ADMIN — PROCHLORPERAZINE EDISYLATE 10 MG: 5 INJECTION INTRAMUSCULAR; INTRAVENOUS at 05:32

## 2023-01-01 RX ADMIN — LORAZEPAM 1 MG: 1 TABLET ORAL at 07:50

## 2023-01-01 RX ADMIN — METRONIDAZOLE 500 MG: 500 INJECTION, SOLUTION INTRAVENOUS at 17:19

## 2023-01-01 RX ADMIN — Medication 100 MG: at 06:21

## 2023-01-01 RX ADMIN — NOREPINEPHRINE BITARTRATE 0.75 MCG/KG/MIN: 1 INJECTION INTRAVENOUS at 06:47

## 2023-01-01 RX ADMIN — SODIUM BICARBONATE 50 MEQ: 84 INJECTION, SOLUTION INTRAVENOUS at 02:30

## 2023-01-01 RX ADMIN — THERA TABS 1 TABLET: TAB at 06:21

## 2023-01-01 RX ADMIN — Medication 100 MCG/HR: at 06:38

## 2023-01-01 RX ADMIN — SODIUM BICARBONATE 100 MEQ: 84 INJECTION, SOLUTION INTRAVENOUS at 04:17

## 2023-01-01 RX ADMIN — PROPRANOLOL HYDROCHLORIDE 10 MG: 10 TABLET ORAL at 16:07

## 2023-01-01 RX ADMIN — CALCIUM GLUCONATE 2 G: 20 INJECTION, SOLUTION INTRAVENOUS at 02:35

## 2023-01-01 RX ADMIN — VASOPRESSIN 0.03 UNITS/MIN: 20 INJECTION INTRAVENOUS at 21:22

## 2023-01-01 RX ADMIN — DEXTROSE MONOHYDRATE 25 G: 100 INJECTION, SOLUTION INTRAVENOUS at 06:20

## 2023-01-01 RX ADMIN — ACETAMINOPHEN 975 MG: 650 SUPPOSITORY RECTAL at 05:32

## 2023-01-01 RX ADMIN — Medication 300 MCG: at 08:25

## 2023-01-01 RX ADMIN — PROPOFOL 200 MG: 10 INJECTION, EMULSION INTRAVENOUS at 14:19

## 2023-01-01 RX ADMIN — PANTOPRAZOLE SODIUM 8 MG/HR: 40 INJECTION, POWDER, FOR SOLUTION INTRAVENOUS at 10:52

## 2023-01-01 RX ADMIN — NOREPINEPHRINE BITARTRATE 0.2 MCG/KG/MIN: 1 INJECTION, SOLUTION, CONCENTRATE INTRAVENOUS at 00:13

## 2023-01-01 RX ADMIN — PANTOPRAZOLE SODIUM 40 MG: 40 INJECTION, POWDER, FOR SOLUTION INTRAVENOUS at 05:20

## 2023-01-01 RX ADMIN — DOCUSATE SODIUM 50 MG AND SENNOSIDES 8.6 MG 2 TABLET: 8.6; 5 TABLET, FILM COATED ORAL at 23:23

## 2023-01-01 RX ADMIN — VASOPRESSIN 0.03 UNITS/MIN: 20 INJECTION INTRAVENOUS at 05:07

## 2023-01-01 RX ADMIN — PANTOPRAZOLE SODIUM 8 MG/HR: 40 INJECTION, POWDER, FOR SOLUTION INTRAVENOUS at 20:32

## 2023-01-01 RX ADMIN — Medication 50 MEQ: at 08:20

## 2023-01-01 RX ADMIN — OXYCODONE HYDROCHLORIDE 10 MG: 10 TABLET ORAL at 04:03

## 2023-01-01 RX ADMIN — NOREPINEPHRINE BITARTRATE 0.3 MCG/KG/MIN: 1 INJECTION INTRAVENOUS at 00:09

## 2023-01-01 RX ADMIN — METRONIDAZOLE 500 MG: 500 INJECTION, SOLUTION INTRAVENOUS at 02:27

## 2023-01-01 RX ADMIN — SODIUM BICARBONATE: 84 INJECTION, SOLUTION INTRAVENOUS at 12:49

## 2023-01-01 RX ADMIN — ONDANSETRON 4 MG: 2 INJECTION INTRAMUSCULAR; INTRAVENOUS at 20:34

## 2023-01-01 RX ADMIN — OXYCODONE HYDROCHLORIDE 10 MG: 10 TABLET ORAL at 17:30

## 2023-01-01 RX ADMIN — VASOPRESSIN 0.03 UNITS/MIN: 20 INJECTION INTRAVENOUS at 01:02

## 2023-01-01 RX ADMIN — Medication 200 MCG: at 08:20

## 2023-01-01 RX ADMIN — DEXTROSE MONOHYDRATE 25 G: 100 INJECTION, SOLUTION INTRAVENOUS at 02:32

## 2023-01-01 RX ADMIN — DOCUSATE SODIUM 50 MG AND SENNOSIDES 8.6 MG 2 TABLET: 8.6; 5 TABLET, FILM COATED ORAL at 17:00

## 2023-01-01 RX ADMIN — FOLIC ACID 1 MG: 1 TABLET ORAL at 05:20

## 2023-01-01 RX ADMIN — ALBUTEROL SULFATE 10 MG: 2.5 SOLUTION RESPIRATORY (INHALATION) at 04:39

## 2023-01-01 RX ADMIN — THIAMINE HYDROCHLORIDE 250 MG: 100 INJECTION, SOLUTION INTRAMUSCULAR; INTRAVENOUS at 22:17

## 2023-01-01 RX ADMIN — NOREPINEPHRINE BITARTRATE 0.6 MCG/KG/MIN: 1 INJECTION INTRAVENOUS at 17:51

## 2023-01-01 RX ADMIN — METRONIDAZOLE 500 MG: 500 INJECTION, SOLUTION INTRAVENOUS at 05:25

## 2023-01-01 RX ADMIN — VASOPRESSIN 0.03 UNITS/MIN: 20 INJECTION INTRAVENOUS at 10:30

## 2023-01-01 RX ADMIN — LACTULOSE 300 ML: 10 SOLUTION ORAL; RECTAL at 18:00

## 2023-01-01 RX ADMIN — METRONIDAZOLE 500 MG: 500 INJECTION, SOLUTION INTRAVENOUS at 05:49

## 2023-01-01 RX ADMIN — MIDAZOLAM 2 MG/HR: 5 INJECTION, SOLUTION INTRAMUSCULAR; INTRAVENOUS at 10:45

## 2023-01-01 RX ADMIN — LACTULOSE 300 ML: 10 SOLUTION ORAL; RECTAL at 12:42

## 2023-01-01 RX ADMIN — NOREPINEPHRINE BITARTRATE 0.3 MCG/KG/MIN: 1 INJECTION INTRAVENOUS at 05:37

## 2023-01-01 RX ADMIN — NOREPINEPHRINE BITARTRATE 0.8 MCG/KG/MIN: 1 INJECTION INTRAVENOUS at 20:58

## 2023-01-01 RX ADMIN — DEXTROSE MONOHYDRATE: 100 INJECTION, SOLUTION INTRAVENOUS at 00:43

## 2023-01-01 RX ADMIN — CALCIUM CHLORIDE 1000 MG: 100 INJECTION, SOLUTION INTRAVENOUS at 02:51

## 2023-01-01 RX ADMIN — PROPRANOLOL HYDROCHLORIDE 10 MG: 10 TABLET ORAL at 17:00

## 2023-01-01 RX ADMIN — HYDROCORTISONE SODIUM SUCCINATE 50 MG: 100 INJECTION, POWDER, FOR SOLUTION INTRAMUSCULAR; INTRAVENOUS at 06:25

## 2023-01-01 RX ADMIN — SODIUM BICARBONATE 50 MEQ: 84 INJECTION, SOLUTION INTRAVENOUS at 11:15

## 2023-01-01 RX ADMIN — NOREPINEPHRINE BITARTRATE 0.8 MCG/KG/MIN: 1 INJECTION INTRAVENOUS at 01:38

## 2023-01-01 RX ADMIN — SODIUM CHLORIDE: 9 INJECTION, SOLUTION INTRAVENOUS at 23:18

## 2023-01-01 RX ADMIN — DOCUSATE SODIUM 50 MG AND SENNOSIDES 8.6 MG 2 TABLET: 8.6; 5 TABLET, FILM COATED ORAL at 16:07

## 2023-01-01 RX ADMIN — INSULIN HUMAN 6 UNITS: 100 INJECTION, SOLUTION PARENTERAL at 02:28

## 2023-01-01 RX ADMIN — MIDAZOLAM HYDROCHLORIDE 5 MG: 1 INJECTION, SOLUTION INTRAMUSCULAR; INTRAVENOUS at 17:40

## 2023-01-01 RX ADMIN — OCTREOTIDE ACETATE 50 MCG/HR: 200 INJECTION, SOLUTION INTRAVENOUS; SUBCUTANEOUS at 00:54

## 2023-01-01 RX ADMIN — NOREPINEPHRINE BITARTRATE 0.46 MCG/KG/MIN: 1 INJECTION INTRAVENOUS at 14:30

## 2023-01-01 RX ADMIN — PANTOPRAZOLE SODIUM 8 MG/HR: 40 INJECTION, POWDER, FOR SOLUTION INTRAVENOUS at 13:39

## 2023-01-01 RX ADMIN — IOHEXOL 100 ML: 350 INJECTION, SOLUTION INTRAVENOUS at 15:12

## 2023-01-01 RX ADMIN — PANTOPRAZOLE SODIUM 8 MG/HR: 40 INJECTION, POWDER, FOR SOLUTION INTRAVENOUS at 00:58

## 2023-01-01 RX ADMIN — NOREPINEPHRINE BITARTRATE 0.8 MCG/KG/MIN: 1 INJECTION, SOLUTION, CONCENTRATE INTRAVENOUS at 15:39

## 2023-01-01 RX ADMIN — CEFTRIAXONE SODIUM 2000 MG: 10 INJECTION, POWDER, FOR SOLUTION INTRAVENOUS at 08:41

## 2023-01-01 RX ADMIN — PROPRANOLOL HYDROCHLORIDE 10 MG: 10 TABLET ORAL at 05:20

## 2023-01-01 RX ADMIN — PROPOFOL 10 MCG/KG/MIN: 10 INJECTION, EMULSION INTRAVENOUS at 09:22

## 2023-01-01 RX ADMIN — OCTREOTIDE ACETATE 50 MCG/HR: 200 INJECTION, SOLUTION INTRAVENOUS; SUBCUTANEOUS at 05:37

## 2023-01-01 RX ADMIN — THIAMINE HYDROCHLORIDE 250 MG: 100 INJECTION, SOLUTION INTRAMUSCULAR; INTRAVENOUS at 06:10

## 2023-01-01 RX ADMIN — MIDAZOLAM HYDROCHLORIDE 5 MG: 1 INJECTION, SOLUTION INTRAMUSCULAR; INTRAVENOUS at 15:40

## 2023-01-01 RX ADMIN — DEXTROSE MONOHYDRATE: 100 INJECTION, SOLUTION INTRAVENOUS at 10:47

## 2023-01-01 RX ADMIN — ROCURONIUM BROMIDE 50 MG: 50 INJECTION, SOLUTION INTRAVENOUS at 10:38

## 2023-01-01 RX ADMIN — DOCUSATE SODIUM 50 MG AND SENNOSIDES 8.6 MG 2 TABLET: 8.6; 5 TABLET, FILM COATED ORAL at 05:26

## 2023-01-01 RX ADMIN — OCTREOTIDE ACETATE 50 MCG/HR: 200 INJECTION, SOLUTION INTRAVENOUS; SUBCUTANEOUS at 02:48

## 2023-01-01 RX ADMIN — MAGNESIUM SULFATE HEPTAHYDRATE 2 G: 2 INJECTION, SOLUTION INTRAVENOUS at 09:21

## 2023-01-01 RX ADMIN — OCTREOTIDE ACETATE 50 MCG/HR: 200 INJECTION, SOLUTION INTRAVENOUS; SUBCUTANEOUS at 08:40

## 2023-01-01 RX ADMIN — PANTOPRAZOLE SODIUM 40 MG: 40 INJECTION, POWDER, FOR SOLUTION INTRAVENOUS at 17:49

## 2023-01-01 RX ADMIN — CALCIUM CHLORIDE 1 G: 100 INJECTION INTRAVENOUS; INTRAVENTRICULAR at 10:27

## 2023-01-01 RX ADMIN — CEFTRIAXONE SODIUM 2000 MG: 10 INJECTION, POWDER, FOR SOLUTION INTRAVENOUS at 06:43

## 2023-01-01 RX ADMIN — SODIUM CHLORIDE, POTASSIUM CHLORIDE, SODIUM LACTATE AND CALCIUM CHLORIDE: 600; 310; 30; 20 INJECTION, SOLUTION INTRAVENOUS at 13:59

## 2023-01-01 RX ADMIN — VASOPRESSIN 0.03 UNITS/MIN: 20 INJECTION INTRAVENOUS at 08:42

## 2023-01-01 RX ADMIN — PROPOFOL 30 MG: 10 INJECTION, EMULSION INTRAVENOUS at 09:21

## 2023-01-01 RX ADMIN — FUROSEMIDE 100 MG: 10 INJECTION, SOLUTION INTRAMUSCULAR; INTRAVENOUS at 02:29

## 2023-01-01 RX ADMIN — METHYLPREDNISOLONE SODIUM SUCCINATE 40 MG: 40 INJECTION, POWDER, FOR SOLUTION INTRAMUSCULAR; INTRAVENOUS at 12:33

## 2023-01-01 RX ADMIN — THERA TABS 1 TABLET: TAB at 05:26

## 2023-01-01 RX ADMIN — PANTOPRAZOLE SODIUM 40 MG: 40 INJECTION, POWDER, FOR SOLUTION INTRAVENOUS at 05:26

## 2023-01-01 RX ADMIN — CALCIUM CHLORIDE 1 G: 100 INJECTION INTRAVENOUS; INTRAVENTRICULAR at 11:21

## 2023-01-01 RX ADMIN — THIAMINE HYDROCHLORIDE: 100 INJECTION, SOLUTION INTRAMUSCULAR; INTRAVENOUS at 17:22

## 2023-01-01 ASSESSMENT — COGNITIVE AND FUNCTIONAL STATUS - GENERAL
HELP NEEDED FOR BATHING: TOTAL
SUGGESTED CMS G CODE MODIFIER MOBILITY: CH
MOVING TO AND FROM BED TO CHAIR: UNABLE
SUGGESTED CMS G CODE MODIFIER DAILY ACTIVITY: CH
MOBILITY SCORE: 24
TURNING FROM BACK TO SIDE WHILE IN FLAT BAD: UNABLE
SUGGESTED CMS G CODE MODIFIER DAILY ACTIVITY: CN
MOBILITY SCORE: 6
DRESSING REGULAR UPPER BODY CLOTHING: TOTAL
WALKING IN HOSPITAL ROOM: TOTAL
MOVING FROM LYING ON BACK TO SITTING ON SIDE OF FLAT BED: UNABLE
MOBILITY SCORE: 24
DAILY ACTIVITIY SCORE: 24
SUGGESTED CMS G CODE MODIFIER MOBILITY: CH
DRESSING REGULAR LOWER BODY CLOTHING: TOTAL
TOILETING: TOTAL
DAILY ACTIVITIY SCORE: 24
STANDING UP FROM CHAIR USING ARMS: TOTAL
SUGGESTED CMS G CODE MODIFIER MOBILITY: CN
DAILY ACTIVITIY SCORE: 6
EATING MEALS: TOTAL
PERSONAL GROOMING: TOTAL
CLIMB 3 TO 5 STEPS WITH RAILING: TOTAL
SUGGESTED CMS G CODE MODIFIER DAILY ACTIVITY: CH

## 2023-01-01 ASSESSMENT — ENCOUNTER SYMPTOMS
MYALGIAS: 0
MYALGIAS: 0
VOMITING: 0
BLURRED VISION: 0
NERVOUS/ANXIOUS: 0
PALPITATIONS: 0
NAUSEA: 1
COUGH: 0
HALLUCINATIONS: 0
CONSTIPATION: 0
BLOOD IN STOOL: 0
VOMITING: 1
BLOOD IN STOOL: 0
NERVOUS/ANXIOUS: 0
DEPRESSION: 0
FLANK PAIN: 0
SHORTNESS OF BREATH: 0
FLANK PAIN: 0
CHILLS: 0
NAUSEA: 1
TREMORS: 0
PHOTOPHOBIA: 0
COUGH: 0
WEAKNESS: 0
SINUS PAIN: 0
NERVOUS/ANXIOUS: 0
COUGH: 0
HEADACHES: 0
SORE THROAT: 0
ORTHOPNEA: 0
SHORTNESS OF BREATH: 0
ABDOMINAL PAIN: 0
NAUSEA: 0
CHILLS: 0
SENSORY CHANGE: 0
NECK PAIN: 0
DOUBLE VISION: 0
SPEECH CHANGE: 0
DIARRHEA: 0
VOMITING: 1
HEARTBURN: 1
WEAKNESS: 0
BACK PAIN: 0
HEMOPTYSIS: 0
DIARRHEA: 0
ABDOMINAL PAIN: 0
TREMORS: 1
FEVER: 0
DIZZINESS: 0
ABDOMINAL PAIN: 1
ROS GI COMMENTS: HEMATEMESIS
FALLS: 0
SINUS PAIN: 0
FLANK PAIN: 0
TINGLING: 0
WEAKNESS: 0
HEADACHES: 0
FOCAL WEAKNESS: 0
SPUTUM PRODUCTION: 0
DIARRHEA: 0
FALLS: 0
HEADACHES: 0
FEVER: 0
FEVER: 0
BLOOD IN STOOL: 0
SORE THROAT: 0
CHILLS: 0
SHORTNESS OF BREATH: 0
CONSTIPATION: 0

## 2023-01-01 ASSESSMENT — LIFESTYLE VARIABLES
HEADACHE, FULLNESS IN HEAD: NOT PRESENT
HEADACHE, FULLNESS IN HEAD: NOT PRESENT
PAROXYSMAL SWEATS: NO SWEAT VISIBLE
HOW MANY TIMES IN THE PAST YEAR HAVE YOU HAD 5 OR MORE DRINKS IN A DAY: 365
HEADACHE, FULLNESS IN HEAD: NOT PRESENT
AGITATION: NORMAL ACTIVITY
TOTAL SCORE: 4
ORIENTATION AND CLOUDING OF SENSORIUM: ORIENTED AND CAN DO SERIAL ADDITIONS
TOTAL SCORE: 4
DOES PATIENT WANT TO STOP DRINKING: YES
AUDITORY DISTURBANCES: NOT PRESENT
AUDITORY DISTURBANCES: NOT PRESENT
TOTAL SCORE: 4
AGITATION: NORMAL ACTIVITY
NAUSEA AND VOMITING: *
PAROXYSMAL SWEATS: NO SWEAT VISIBLE
EVER FELT BAD OR GUILTY ABOUT YOUR DRINKING: YES
NAUSEA AND VOMITING: MILD NAUSEA WITH NO VOMITING
ALCOHOL_USE: YES
PAROXYSMAL SWEATS: NO SWEAT VISIBLE
ANXIETY: NO ANXIETY (AT EASE)
TOTAL SCORE: 3
TOTAL SCORE: 4
TOTAL SCORE: 8
TREMOR: *
TREMOR: TREMOR NOT VISIBLE BUT CAN BE FELT, FINGERTIP TO FINGERTIP
HEADACHE, FULLNESS IN HEAD: NOT PRESENT
ORIENTATION AND CLOUDING OF SENSORIUM: ORIENTED AND CAN DO SERIAL ADDITIONS
ANXIETY: NO ANXIETY (AT EASE)
VISUAL DISTURBANCES: NOT PRESENT
ORIENTATION AND CLOUDING OF SENSORIUM: ORIENTED AND CAN DO SERIAL ADDITIONS
ORIENTATION AND CLOUDING OF SENSORIUM: ORIENTED AND CAN DO SERIAL ADDITIONS
NAUSEA AND VOMITING: INTERMITTENT NAUSEA WITH DRY HEAVES
HEADACHE, FULLNESS IN HEAD: NOT PRESENT
NAUSEA AND VOMITING: *
HEADACHE, FULLNESS IN HEAD: NOT PRESENT
VISUAL DISTURBANCES: NOT PRESENT
AGITATION: NORMAL ACTIVITY
AGITATION: NORMAL ACTIVITY
AUDITORY DISTURBANCES: NOT PRESENT
NAUSEA AND VOMITING: *
TOTAL SCORE: 4
NAUSEA AND VOMITING: MILD NAUSEA WITH NO VOMITING
ANXIETY: NO ANXIETY (AT EASE)
AUDITORY DISTURBANCES: NOT PRESENT
AGITATION: NORMAL ACTIVITY
ORIENTATION AND CLOUDING OF SENSORIUM: ORIENTED AND CAN DO SERIAL ADDITIONS
ORIENTATION AND CLOUDING OF SENSORIUM: ORIENTED AND CAN DO SERIAL ADDITIONS
AUDITORY DISTURBANCES: NOT PRESENT
NAUSEA AND VOMITING: MILD NAUSEA WITH NO VOMITING
TREMOR: NO TREMOR
TREMOR: NO TREMOR
ANXIETY: NO ANXIETY (AT EASE)
ORIENTATION AND CLOUDING OF SENSORIUM: ORIENTED AND CAN DO SERIAL ADDITIONS
TREMOR: NO TREMOR
VISUAL DISTURBANCES: NOT PRESENT
NAUSEA AND VOMITING: *
AGITATION: NORMAL ACTIVITY
ALCOHOL_USE: YES
AVERAGE NUMBER OF DAYS PER WEEK YOU HAVE A DRINK CONTAINING ALCOHOL: 7
ANXIETY: MILDLY ANXIOUS
ORIENTATION AND CLOUDING OF SENSORIUM: ORIENTED AND CAN DO SERIAL ADDITIONS
AUDITORY DISTURBANCES: NOT PRESENT
EVER FELT BAD OR GUILTY ABOUT YOUR DRINKING: YES
VISUAL DISTURBANCES: VERY MILD SENSITIVITY
AUDITORY DISTURBANCES: NOT PRESENT
EVER HAD A DRINK FIRST THING IN THE MORNING TO STEADY YOUR NERVES TO GET RID OF A HANGOVER: YES
ANXIETY: NO ANXIETY (AT EASE)
HEADACHE, FULLNESS IN HEAD: NOT PRESENT
PAROXYSMAL SWEATS: NO SWEAT VISIBLE
HEADACHE, FULLNESS IN HEAD: NOT PRESENT
HEADACHE, FULLNESS IN HEAD: NOT PRESENT
AUDITORY DISTURBANCES: NOT PRESENT
TOTAL SCORE: 4
TOTAL SCORE: 4
PAROXYSMAL SWEATS: BARELY PERCEPTIBLE SWEATING. PALMS MOIST
TREMOR: NO TREMOR
VISUAL DISTURBANCES: NOT PRESENT
TREMOR: NO TREMOR
VISUAL DISTURBANCES: MILD SENSITIVITY
SUBSTANCE_ABUSE: 0
TOTAL SCORE: 3
AUDITORY DISTURBANCES: NOT PRESENT
TREMOR: *
PAROXYSMAL SWEATS: NO SWEAT VISIBLE
NAUSEA AND VOMITING: INTERMITTENT NAUSEA WITH DRY HEAVES
HEADACHE, FULLNESS IN HEAD: NOT PRESENT
VISUAL DISTURBANCES: NOT PRESENT
PAROXYSMAL SWEATS: NO SWEAT VISIBLE
TOTAL SCORE: VERY MILD ITCHING, PINS AND NEEDLES SENSATION, BURNING OR NUMBNESS
ANXIETY: NO ANXIETY (AT EASE)
AGITATION: NORMAL ACTIVITY
TOTAL SCORE: 4
PAROXYSMAL SWEATS: NO SWEAT VISIBLE
TOTAL SCORE: 4
PAROXYSMAL SWEATS: NO SWEAT VISIBLE
ANXIETY: NO ANXIETY (AT EASE)
VISUAL DISTURBANCES: NOT PRESENT
NAUSEA AND VOMITING: *
AUDITORY DISTURBANCES: NOT PRESENT
TREMOR: NO TREMOR
TOTAL SCORE: 1
AGITATION: NORMAL ACTIVITY
AUDITORY DISTURBANCES: NOT PRESENT
NAUSEA AND VOMITING: *
TOTAL SCORE: 10
AUDITORY DISTURBANCES: NOT PRESENT
CONSUMPTION TOTAL: POSITIVE
TOTAL SCORE: 1
TREMOR: NO TREMOR
TOTAL SCORE: 7
CONSUMPTION TOTAL: POSITIVE
ORIENTATION AND CLOUDING OF SENSORIUM: ORIENTED AND CAN DO SERIAL ADDITIONS
HAVE PEOPLE ANNOYED YOU BY CRITICIZING YOUR DRINKING: YES
NAUSEA AND VOMITING: INTERMITTENT NAUSEA WITH DRY HEAVES
VISUAL DISTURBANCES: MILD SENSITIVITY
HAVE YOU EVER FELT YOU SHOULD CUT DOWN ON YOUR DRINKING: YES
PAROXYSMAL SWEATS: NO SWEAT VISIBLE
AUDITORY DISTURBANCES: NOT PRESENT
AGITATION: NORMAL ACTIVITY
VISUAL DISTURBANCES: MILD SENSITIVITY
EVER HAD A DRINK FIRST THING IN THE MORNING TO STEADY YOUR NERVES TO GET RID OF A HANGOVER: YES
TREMOR: *
NAUSEA AND VOMITING: MILD NAUSEA WITH NO VOMITING
TOTAL SCORE: 4
AGITATION: NORMAL ACTIVITY
AUDITORY DISTURBANCES: NOT PRESENT
AUDITORY DISTURBANCES: NOT PRESENT
ORIENTATION AND CLOUDING OF SENSORIUM: ORIENTED AND CAN DO SERIAL ADDITIONS
ANXIETY: NO ANXIETY (AT EASE)
SUBSTANCE_ABUSE: 1
NAUSEA AND VOMITING: *
VISUAL DISTURBANCES: MILD SENSITIVITY
TOTAL SCORE: 1
TOTAL SCORE: VERY MILD ITCHING, PINS AND NEEDLES SENSATION, BURNING OR NUMBNESS
AUDITORY DISTURBANCES: NOT PRESENT
AVERAGE NUMBER OF DAYS PER WEEK YOU HAVE A DRINK CONTAINING ALCOHOL: 7
ANXIETY: NO ANXIETY (AT EASE)
HEADACHE, FULLNESS IN HEAD: NOT PRESENT
PAROXYSMAL SWEATS: NO SWEAT VISIBLE
TREMOR: NO TREMOR
HEADACHE, FULLNESS IN HEAD: NOT PRESENT
AUDITORY DISTURBANCES: NOT PRESENT
TOTAL SCORE: 3
ORIENTATION AND CLOUDING OF SENSORIUM: ORIENTED AND CAN DO SERIAL ADDITIONS
NAUSEA AND VOMITING: *
ANXIETY: NO ANXIETY (AT EASE)
ORIENTATION AND CLOUDING OF SENSORIUM: ORIENTED AND CAN DO SERIAL ADDITIONS
AGITATION: NORMAL ACTIVITY
AGITATION: NORMAL ACTIVITY
TREMOR: TREMOR NOT VISIBLE BUT CAN BE FELT, FINGERTIP TO FINGERTIP
ANXIETY: NO ANXIETY (AT EASE)
ANXIETY: NO ANXIETY (AT EASE)
HEADACHE, FULLNESS IN HEAD: NOT PRESENT
AGITATION: NORMAL ACTIVITY
ORIENTATION AND CLOUDING OF SENSORIUM: ORIENTED AND CAN DO SERIAL ADDITIONS
ANXIETY: NO ANXIETY (AT EASE)
DOES PATIENT WANT TO STOP DRINKING: CANNOT ASSESS
VISUAL DISTURBANCES: NOT PRESENT
AGITATION: NORMAL ACTIVITY
HEADACHE, FULLNESS IN HEAD: NOT PRESENT
AUDITORY DISTURBANCES: NOT PRESENT
AGITATION: NORMAL ACTIVITY
ORIENTATION AND CLOUDING OF SENSORIUM: ORIENTED AND CAN DO SERIAL ADDITIONS
ANXIETY: NO ANXIETY (AT EASE)
VISUAL DISTURBANCES: NOT PRESENT
HOW MANY TIMES IN THE PAST YEAR HAVE YOU HAD 5 OR MORE DRINKS IN A DAY: 365
TREMOR: NO TREMOR
ON A TYPICAL DAY WHEN YOU DRINK ALCOHOL HOW MANY DRINKS DO YOU HAVE: 10
TOTAL SCORE: 4
VISUAL DISTURBANCES: NOT PRESENT
TREMOR: TREMOR NOT VISIBLE BUT CAN BE FELT, FINGERTIP TO FINGERTIP
AGITATION: NORMAL ACTIVITY
ORIENTATION AND CLOUDING OF SENSORIUM: ORIENTED AND CAN DO SERIAL ADDITIONS
HEADACHE, FULLNESS IN HEAD: NOT PRESENT
TOTAL SCORE: 6
AGITATION: NORMAL ACTIVITY
VISUAL DISTURBANCES: NOT PRESENT
PAROXYSMAL SWEATS: NO SWEAT VISIBLE
TOTAL SCORE: 4
TREMOR: TREMOR NOT VISIBLE BUT CAN BE FELT, FINGERTIP TO FINGERTIP
TREMOR: NO TREMOR
PAROXYSMAL SWEATS: *
HAVE PEOPLE ANNOYED YOU BY CRITICIZING YOUR DRINKING: YES
PAROXYSMAL SWEATS: BARELY PERCEPTIBLE SWEATING. PALMS MOIST
AGITATION: NORMAL ACTIVITY
PAROXYSMAL SWEATS: NO SWEAT VISIBLE
ANXIETY: NO ANXIETY (AT EASE)
NAUSEA AND VOMITING: NO NAUSEA AND NO VOMITING
ORIENTATION AND CLOUDING OF SENSORIUM: ORIENTED AND CAN DO SERIAL ADDITIONS
VISUAL DISTURBANCES: NOT PRESENT
VISUAL DISTURBANCES: NOT PRESENT
DOES PATIENT WANT TO TALK TO SOMEONE ABOUT QUITTING: NO
TOTAL SCORE: 1
PAROXYSMAL SWEATS: NO SWEAT VISIBLE
ANXIETY: NO ANXIETY (AT EASE)
PAROXYSMAL SWEATS: NO SWEAT VISIBLE
NAUSEA AND VOMITING: MILD NAUSEA WITH NO VOMITING
NAUSEA AND VOMITING: INTERMITTENT NAUSEA WITH DRY HEAVES
HEADACHE, FULLNESS IN HEAD: NOT PRESENT
TOTAL SCORE: VERY MILD ITCHING, PINS AND NEEDLES SENSATION, BURNING OR NUMBNESS
TOTAL SCORE: 3
SUBSTANCE_ABUSE: 1
ON A TYPICAL DAY WHEN YOU DRINK ALCOHOL HOW MANY DRINKS DO YOU HAVE: 10
PAROXYSMAL SWEATS: BARELY PERCEPTIBLE SWEATING. PALMS MOIST
VISUAL DISTURBANCES: NOT PRESENT
HEADACHE, FULLNESS IN HEAD: NOT PRESENT
ORIENTATION AND CLOUDING OF SENSORIUM: ORIENTED AND CAN DO SERIAL ADDITIONS
ORIENTATION AND CLOUDING OF SENSORIUM: ORIENTED AND CAN DO SERIAL ADDITIONS
HEADACHE, FULLNESS IN HEAD: NOT PRESENT
TREMOR: NO TREMOR
HAVE YOU EVER FELT YOU SHOULD CUT DOWN ON YOUR DRINKING: YES
ANXIETY: NO ANXIETY (AT EASE)

## 2023-01-01 ASSESSMENT — PAIN DESCRIPTION - PAIN TYPE
TYPE: ACUTE PAIN

## 2023-01-01 ASSESSMENT — PATIENT HEALTH QUESTIONNAIRE - PHQ9
1. LITTLE INTEREST OR PLEASURE IN DOING THINGS: NOT AT ALL
1. LITTLE INTEREST OR PLEASURE IN DOING THINGS: NOT AT ALL
2. FEELING DOWN, DEPRESSED, IRRITABLE, OR HOPELESS: NOT AT ALL
SUM OF ALL RESPONSES TO PHQ9 QUESTIONS 1 AND 2: 0
2. FEELING DOWN, DEPRESSED, IRRITABLE, OR HOPELESS: NOT AT ALL
SUM OF ALL RESPONSES TO PHQ9 QUESTIONS 1 AND 2: 0

## 2023-01-01 ASSESSMENT — FIBROSIS 4 INDEX
FIB4 SCORE: 6.07
FIB4 SCORE: 39.32
FIB4 SCORE: 22.12
FIB4 SCORE: 5.82
FIB4 SCORE: 35.47
FIB4 SCORE: 5.82
FIB4 SCORE: 33.65
FIB4 SCORE: 6.07
FIB4 SCORE: 6.42
FIB4 SCORE: 32.7

## 2023-12-14 PROBLEM — R79.89 ELEVATED LFTS: Status: ACTIVE | Noted: 2023-01-01

## 2023-12-14 PROBLEM — R11.2 NAUSEA AND VOMITING: Status: ACTIVE | Noted: 2023-01-01

## 2023-12-14 PROBLEM — K92.2 GI BLEED: Status: ACTIVE | Noted: 2023-01-01

## 2023-12-14 PROBLEM — E66.09 CLASS 2 OBESITY DUE TO EXCESS CALORIES WITHOUT SERIOUS COMORBIDITY WITH BODY MASS INDEX (BMI) OF 36.0 TO 36.9 IN ADULT: Status: ACTIVE | Noted: 2023-01-01

## 2023-12-14 PROBLEM — F10.10 ALCOHOL ABUSE: Status: ACTIVE | Noted: 2023-01-01

## 2023-12-15 PROBLEM — D62 ACUTE BLOOD LOSS ANEMIA: Status: ACTIVE | Noted: 2023-01-01

## 2023-12-15 PROBLEM — F10.20 ALCOHOL DEPENDENCE (HCC): Status: ACTIVE | Noted: 2023-01-01

## 2023-12-15 NOTE — PROGRESS NOTES
Monitor Summary     Rhythm: ST  Heart Rate: 130-136  Ectopy: touch 150  Measurement: .12/.08/.27    NEW ADMIT NO STRIP UPLOADED

## 2023-12-15 NOTE — CARE PLAN
The patient is Stable - Low risk of patient condition declining or worsening    Shift Goals  Clinical Goals: CIWA, labs, NPO  Patient Goals: Rest  Family Goals: LARRY    Progress made toward(s) clinical / shift goals:    Problem: Optimal Care for Alcohol Withdrawal  Goal: Optimal Care for the alcohol withdrawal patient  Description: Target End Date:  1 to 3 days    1.  Alcohol history screening done on admission  2.  CIWA-AR score assessment (includes assessment of nausea/vomiting, tremor, sweats, anxiety, agitation, tactile, visual and auditory disturbances, headache and orientation/sensorium).  Document on CIWA flowsheet.  3.  Follow CIWA-AR score protocol  4.  Frequent reorientation  5.  Monitor for fluid and electrolyte imbalance.  6.  Assess for respiratory depression due to sedation (pulse ox)  7.  Consider thiamine, multivitamins, folic acid and magnesium sulfate per provider order  8.  Collaborate with Social Workers/Case Management  9.  Collaborate with mental health  Outcome: Progressing  Note: CIWA protocol in place. Seizure precautions implemented and in place.     Problem: Seizure Precautions  Goal: Implementation of seizure precautions  Description: Target End Date:  Prior to discharge or change in level of care    1.  Padded side rails up at all times  2.  Suction equipment and oxygen delivery system at bedside  3.  Continuous pulse oximeter in use  4.  Implement fall precautions, bed alarm on, bed in lowest position  5.  IV access (per order)  6.  Provide low stimulus environment, avoid exposure to triggers  7.  Instruct patient to use call light/seizure button if having warning signs of impending seizure  Outcome: Progressing  Note: Seizure precautions in place: suction at bedside, padded rails, bed in lowest, locked position.     Problem: Fall Risk  Goal: Patient will remain free from falls  Description: Target End Date:  Prior to discharge or change in level of care    Document interventions on the  [facility] [employer] Fall Risk Assessment    1.  Assess for fall risk factors  2.  Implement fall precautions  Outcome: Progressing  Note: Pt is a moderate fall risk. Bed is in lowest, locked position. Call light and belongings within reach. Bed alarm is on. Treaded socks on. Pt utilizes call light appropriately.

## 2023-12-15 NOTE — INTERVAL H&P NOTE
Attending Attestation:    I have personally examined the patient and repeated the key components of history and evaluated relevant portions of the patient's laboratory tests, imaging studies, as well as any relevant reports.     The assessment and plan were formulated under my direction and discussed with the GI Advanced practice provider.     I agree with this note as transcribed.    Plan for egd  The risk, benefits, and alternatives were discussed in detail. Risks include bowel perforation, procedure related bleeding event, infection, inability to safely complete the exam, sedation related complications. The patient, understanding the discussion, consents to proceed forward.

## 2023-12-15 NOTE — ANESTHESIA POSTPROCEDURE EVALUATION
Patient: Mk Virk    Procedure Summary       Date: 12/15/23 Room / Location: [facility] ROOM 26 / SURGERY SAME DAY [facility]    Anesthesia Start: 1359 Anesthesia Stop: 1421    Procedures:       GASTROSCOPY (Esophagus)      GASTROSCOPY, WITH VARICEAL BANDING (Esophagus) Diagnosis: (Esophageal Varices, Portal Hypertensive Gastropathy)    Surgeons: Benson Lott M.D. Responsible Provider: Dale Pickard M.D.    Anesthesia Type: MAC ASA Status: 3            Final Anesthesia Type: MAC  Last vitals  BP   Blood Pressure: 123/60    Temp   36.9 °C (98.5 °F)    Pulse   83   Resp   16    SpO2   98 %      Anesthesia Post Evaluation    Patient location during evaluation: PACU  Patient participation: complete - patient participated  Level of consciousness: awake and alert    Airway patency: patent  Anesthetic complications: no  Cardiovascular status: hemodynamically stable  Respiratory status: acceptable  Hydration status: euvolemic    PONV: none          No notable events documented.     Nurse Pain Score: 0 (NPRS)

## 2023-12-15 NOTE — ASSESSMENT & PLAN NOTE
Viral hepatitis panel is nonreactive.  INR 1.98.  LFTs are stable.  Continue to trend.  Will get CT scan of the abdomen/pelvis with contrast.  Will need outpatient follow-up with GI.

## 2023-12-15 NOTE — ASSESSMENT & PLAN NOTE
Due to acute GI bleed.  Hemoglobin has trended down, but remains above transfusion threshold.  Monitor hemoglobin/hematocrit every 12 hours.  Restrictive transfusion strategy.

## 2023-12-15 NOTE — ASSESSMENT & PLAN NOTE
Drinks a fifth daily. He is been abusing alcohol for 3 years now.  He has had episodes of alcohol withdrawal where he gets delirium tremens.  Has never had seizure in the past.  Currently not showing signs of alcohol withdrawal, but at very high risk to withdraw.  Continue as needed Ativan for CIWA protocol.  If starts to withdraw, will start him on scheduled Librium.  Continue thiamine, folate and multivitamins.  Clinically monitor closely.

## 2023-12-15 NOTE — ANESTHESIA TIME REPORT
Anesthesia Start and Stop Event Times       Date Time Event    12/15/2023 1359 Anesthesia Start     1402 Ready for Procedure     1421 Anesthesia Stop          Responsible Staff  12/15/23      Name Role Begin End    Dale Pickard M.D. Anesth 1359 1421          Overtime Reason:  no overtime (within assigned shift)    Comments:

## 2023-12-15 NOTE — PROGRESS NOTES
4 Eyes Skin Assessment Completed by BEATRIS Amato and BEATRIS Lopez.    Head WDL  Ears WDL  Nose WDL  Mouth Dry and Cracked  Neck WDL  Breast/Chest WDL  Shoulder Blades WDL  Spine WDL  (R) Arm/Elbow/Hand WDL  (L) Arm/Elbow/Hand WDL  Abdomen WDL  Groin Scar  Scrotum/Coccyx/Buttocks WDL  (R) Leg WDL  (L) Leg WDL  (R) Heel/Foot/Toe Redness and Blanching  (L) Heel/Foot/Toe Redness and Blanching          Devices In Places Tele Box, Blood Pressure Cuff, and Pulse Ox      Interventions In Place Pillows, Heels Loaded W/Pillows, and Pressure Redistribution Mattress    Possible Skin Injury No    Pictures Uploaded Into Epic N/A  Wound Consult Placed N/A  RN Wound Prevention Protocol Ordered No

## 2023-12-15 NOTE — ASSESSMENT & PLAN NOTE
Considering for variceal bleed given cirrhosis and alcohol dependence.  Hemoglobin has trended down although remains above transfusion threshold.  Maintaining good hemodynamics.  S/p EGD 12/16/2023, which showed grade 3 esophageal varices in the distal one third of the esophagus with high risk stigmata present but no active bleeding, with no gastric varices but with noted mild to moderate diffuse portal hypertensive gastropathy, and had variceal band ligation x 7 with incomplete decompression but adequate.   Continue IV octreotide drip for 72 hours, should complete by tomorrow morning.  Continue IV Protonix twice daily.    Continue prophylactic ceftriaxone.  Continue propranolol, so far tolerating.  Continue to monitor hemoglobin/hematocrit every 12 hours and transfuse as needed.  Close hemodynamic monitoring.  He is counseled extensively against further alcohol use.

## 2023-12-15 NOTE — PROGRESS NOTES
Hospital Medicine Daily Progress Note    Date of Service  12/15/2023    Chief Complaint  GI bleed    Hospital Course  Mk Virk is a 28 y.o. male with past medical history of chronic alcohol dependence with history of alcohol withdrawal, who presented 12/14/2023 with 3-4 episodes hematemesis in the last 1 day.  He also had an episode of melanotic stools.  He initially presented at an outside facility where hemoglobin was 12.2,  and ALT 87 with bilirubin of 6 and lipase of 123.  Patient was given pantoprazole and octreotide and was transferred for higher level of care.  Repeat hemoglobin on arrival was 10.3.  WBC 11.  Patient started on IV Protonix.  He is started on CIWA protocol.    Interval Problem Update  12/15/2023 - I reviewed the patient's chart today. Uneventful night. VSS. Afebrile. Saturating well on RA. CIWA 8-10.  Hemoglobin dropped to 9.5.  Right upper quadrant ultrasound showed nonspecific gallbladder wall thickening without visualized shadowing stones, with hepatomegaly, echogenic liver with irregular contour favoring changes of cirrhosis, and trace perihepatic ascites.  Maintaining sinus rhythm on telemetry.  I have started him on octreotide drip, and I have contacted GI for consult who recommended EGD this afternoon.    > I have personally seen and examined the patient today.  Had melena this morning, but no further hematemesis.  Had some nausea but no vomiting.  No chest pain or shortness of breath.  He admits to drinking heavily especially on the weekends.     I personally reviewed all lab results mentioned above. Prior medical records from this institution and outside facilities were independently reviewed as noted. I also personally reviewed all ER physician and consultant recommendations and plans as documented above. History was independently obtained by myself. I have discussed this patient's plan of care and discharge plan at IDT rounds today with Case Management, Nursing,  Nursing leadership, and other members of the IDT team.    Consultants/Specialty  GI    Code Status  Full Code    Disposition  The patient is not medically cleared for discharge to home or a post-acute facility.      Anticipate discharge to home once medically cleared.  I have placed the appropriate orders for post-discharge needs.    Review of Systems  ROS     Pertinent positives/negatives as mentioned above.     A complete review of systems was personally done by me. All other systems were negative.       Physical Exam  Temp:  [36.2 °C (97.2 °F)-37.4 °C (99.3 °F)] 37.4 °C (99.3 °F)  Pulse:  [] 81  Resp:  [16-18] 16  BP: (131-157)/(59-80) 131/60  SpO2:  [85 %-94 %] 93 %    Physical Exam  Vitals reviewed.   Constitutional:       General: He is not in acute distress.     Appearance: Normal appearance. He is obese. He is not toxic-appearing or diaphoretic.      Comments: Body mass index is 36.9 kg/m².   HENT:      Head: Normocephalic and atraumatic.      Right Ear: External ear normal.      Left Ear: External ear normal.      Mouth/Throat:      Mouth: Mucous membranes are moist.      Pharynx: No oropharyngeal exudate.   Eyes:      General: No scleral icterus.     Extraocular Movements: Extraocular movements intact.      Conjunctiva/sclera: Conjunctivae normal.      Pupils: Pupils are equal, round, and reactive to light.   Cardiovascular:      Rate and Rhythm: Normal rate and regular rhythm.      Heart sounds: Normal heart sounds. No murmur heard.     No gallop.   Pulmonary:      Effort: Pulmonary effort is normal. No respiratory distress.      Breath sounds: Normal breath sounds. No stridor. No wheezing, rhonchi or rales.   Chest:      Chest wall: No tenderness.   Abdominal:      General: Bowel sounds are normal. There is no distension.      Palpations: Abdomen is soft. There is no mass.      Tenderness: There is no abdominal tenderness. There is no guarding or rebound.   Musculoskeletal:         General: No  swelling. Normal range of motion.      Cervical back: Normal range of motion and neck supple.      Right lower leg: No edema.      Left lower leg: No edema.   Lymphadenopathy:      Cervical: No cervical adenopathy.   Skin:     General: Skin is warm and dry.      Coloration: Skin is not jaundiced.      Findings: No rash.   Neurological:      General: No focal deficit present.      Mental Status: He is alert and oriented to person, place, and time.      Cranial Nerves: No cranial nerve deficit.   Psychiatric:         Mood and Affect: Mood normal.         Behavior: Behavior normal.         Thought Content: Thought content normal.         Judgment: Judgment normal.         Fluids    Intake/Output Summary (Last 24 hours) at 12/15/2023 1354  Last data filed at 12/15/2023 0700  Gross per 24 hour   Intake 1698.54 ml   Output --   Net 1698.54 ml       Laboratory  Recent Labs     12/15/23  0145 12/15/23  0716 12/15/23  1127   WBC 11.0*  --   --    RBC 3.03*  --   --    HEMOGLOBIN 10.3* 9.5* 9.8*   HEMATOCRIT 29.9* 27.5* 28.3*   MCV 98.7*  --   --    MCH 34.0*  --   --    MCHC 34.4  --   --    RDW 52.5*  --   --    PLATELETCT 83*  --   --    MPV 11.3  --   --      Recent Labs     12/15/23  0145   SODIUM 137   POTASSIUM 4.4   CHLORIDE 99   CO2 25   GLUCOSE 292*   BUN 14   CREATININE 0.60   CALCIUM 7.1*             Recent Labs     12/15/23  0145   TRIGLYCERIDE 101   HDL 27*   LDL 94       Imaging  US-RUQ   Final Result         1.  Hepatomegaly   2.  Echogenic liver with irregular contour favoring changes of cirrhosis.   3.  Gallbladder wall thickening without visualized shadowing stones. Nonspecific findings in the setting of hepatocellular disease, but raise concern for cholecystitis. Could be further evaluated with HIDA scan as clinically appropriate.   4.  Trace perihepatic ascites.           Assessment/Plan  * GI bleed- (present on admission)  Assessment & Plan  Considering for variceal bleed given cirrhosis and alcohol  dependence.  Hemoglobin has trended down although remains above transfusion threshold.  Maintaining good hemodynamics.  I have consulted GI who is planning for EGD this afternoon.  Continue IV Protonix twice daily.  I we will start him on octreotide drip until variceal bleed is ruled out.  I will also start him on prophylactic ceftriaxone, along with propranolol.  Monitor hemoglobin/hematocrit every 8 hours and transfuse as needed.  Close hemodynamic monitoring.  He is counseled extensively against further alcohol use.    Acute blood loss anemia- (present on admission)  Assessment & Plan  Due to acute GI bleed.  Hemoglobin has trended down, but remains above transfusion threshold.  Monitor hemoglobin/hematocrit every 8 hours.  Restrictive transfusion strategy.    Alcohol dependence (HCC)- (present on admission)  Assessment & Plan  Drinks a fifth daily. He is been abusing alcohol for 3 years now.  He has had episodes of alcohol withdrawal where he gets delirium tremens.  Has never had seizure in the past.  Currently not showing signs of alcohol withdrawal, but at very high risk to withdraw.  Continue as needed Ativan for CIWA protocol.  If starts to withdraw, will start him on scheduled Librium.  Continue thiamine, folate and multivitamins.  Clinically monitor closely.    Elevated LFTs- (present on admission)  Assessment & Plan  Labs consistent with alcoholic hepatitis.  Continue to trend.    Class 2 obesity due to excess calories without serious comorbidity with body mass index (BMI) of 36.0 to 36.9 in adult- (present on admission)  Assessment & Plan  Body mass index is 36.9 kg/m²..   on weight loss.  Outpatient referral for outpatient weight management.         VTE prophylaxis: SCD      My total time spent caring for the patient on the day of the encounter was 55 minutes. This does not include time spent on separately billable procedures/tests.

## 2023-12-15 NOTE — PROGRESS NOTES
Bedside report received from night RN, pt care assumed, assessment completed. Pt is A&O4, pain 0/10, ST on the monitor. CIWA 8, medicated per MAR. Updated on POC, questions answered. Bed in lowest, locked position, treaded socks on, call light and belongings within reach.

## 2023-12-15 NOTE — ASSESSMENT & PLAN NOTE
Body mass index is 36.9 kg/m²..   on weight loss.  Outpatient referral for outpatient weight management.

## 2023-12-15 NOTE — OR NURSING
0519 patient recovered well in PACU, vss, tolerable pain, denies nausea, tolerates oral intake, report called to Elvia RN, telephone updates to significant other, Riya, patient transferred back to room with this RN, connected to tele monitoring and transport O2, safety ensured, care transferred.

## 2023-12-15 NOTE — ANESTHESIA POSTPROCEDURE EVALUATION
Patient: Mk Virk    Procedure Summary       Date: 12/15/23 Room / Location: [facility] ROOM 26 / SURGERY SAME DAY [facility]    Anesthesia Start: 1359 Anesthesia Stop: 1421    Procedures:       GASTROSCOPY (Esophagus)      GASTROSCOPY, WITH VARICEAL BANDING (Esophagus) Diagnosis: (Esophageal Varices, Portal Hypertensive Gastropathy)    Surgeons: Benson Lott M.D. Responsible Provider: Dale Pickard M.D.    Anesthesia Type: MAC ASA Status: 3            Final Anesthesia Type: MAC  Last vitals  BP   Blood Pressure: 123/60    Temp   36.9 °C (98.5 °F)    Pulse   83   Resp   16    SpO2   98 %      Anesthesia Post Evaluation    Patient location during evaluation: PACU  Patient participation: complete - patient participated  Level of consciousness: awake and alert    Airway patency: patent  Anesthetic complications: no  Cardiovascular status: hemodynamically stable  Respiratory status: acceptable  Hydration status: euvolemic    PONV: none          No notable events documented.     Nurse Pain Score: 0 (NPRS)

## 2023-12-15 NOTE — DISCHARGE PLANNING
Care Transition Team Assessment  LMSW spoke with pt to conduct assessment and verify demographics. Pt stated that he lives with his fiance and children in a SS house with no steps to enter. Pt was independent prior to admission and did not use any DME. Pt states that he currently does not have a PCP. Pt does not have insurance LMSW reached out to [employer] to screen for Medicaid. Upon DC pt will have transportation.     Information Source  Orientation Level: Oriented X4  Information Given By: Patient  Who is responsible for making decisions for patient? : Patient    Readmission Evaluation  Is this a readmission?: No    Elopement Risk  Legal Hold: No  Ambulatory or Self Mobile in Wheelchair: Yes  Disoriented: No  Psychiatric Symptoms: None  History of Wandering: No  Elopement this Admit: No  Vocalizing Wanting to Leave: No  Displays Behaviors, Body Language Wanting to Leave: No-Not at Risk for Elopement  Elopement Risk: Not at Risk for Elopement    Interdisciplinary Discharge Planning  Lives with - Patient's Self Care Capacity: Significant Other, Child Less than 18 Years of Age  Patient or legal guardian wants to designate a caregiver: No  Support Systems: Friends / Neighbors, Spouse / Significant Other  Housing / Facility: [address] [facility]  Durable Medical Equipment: Not Applicable    Discharge Preparedness  What is your plan after discharge?: Uncertain - pending medical team collaboration  What are your discharge supports?: Spouse  Prior Functional Level: Ambulatory, Independent with Activities of Daily Living  Difficulity with ADLs: None  Difficulity with IADLs: None    Functional Assesment  Prior Functional Level: Ambulatory, Independent with Activities of Daily Living    Finances  Financial Barriers to Discharge: No    Vision / Hearing Impairment  Vision Impairment : Yes  Right Eye Vision: Impaired, Patient Declines to Wear Visual Aid  Left Eye Vision: Impaired, Patient Declines to Wear Visual Aid  Hearing Impairment :  No    Advance Directive  Advance Directive?: None    Domestic Abuse  Have you ever been the victim of abuse or violence?: No  Physical Abuse or Sexual Abuse: No  Verbal Abuse or Emotional Abuse: No  Possible Abuse/Neglect Reported to:: Not Applicable    Psychological Assessment  History of Substance Abuse: None  History of Psychiatric Problems: No  Non-compliant with Treatment: No  Newly Diagnosed Illness: No    Discharge Risks or Barriers  Discharge risks or barriers?: No    Anticipated Discharge Information  Discharge Disposition: Discharged to home/self care (01)  Discharge Address: [address] [place] [address]  Discharge Contact Phone Number: [phone]

## 2023-12-15 NOTE — CONSULTS
Date of Consultation:  12/15/2023    Patient: : Mk Virk  MRN: [id number]    Referring Physician:  Cb Naidu MD     GI:WENDY Buckley     Reason for Consultation: Hematemesis, melena, anemia    History of Present Illness:     This is a 28-year-old male with a past medical history including hypertension and alcohol abuse who presented to [facility] on 12/14/2023 with a 1 day history of hematemesis and melena.  Patient reports vomiting approximately 15 times in a 24 hours prior to arrival, 3-4 of these times referring hematemesis.  Patient also reports melena x 1 at home with additional episode of melena since admission.  He reports mild heartburn/burning sensation when vomiting that self resolves on its own and further endorses dry heaving.  He otherwise denies odynophagia, fever, chills, weight loss, abdominal pain.  He denies prior GI history or GI bleeding prior to this.  In the emergency department, leukocytosis of 11, hemoglobin 10.3, 9.5 this morning.  MCV 98.7.  CMP significant for , ALT 50, alk phos 117, total bilirubin 5.4.  Hemoglobin A1c 8.2.  Right upper quadrant ultrasound shows hepatomegaly, i echogenic liver with irregular contour suspicious for cirrhosis, gallbladder wall thickening without visualized stones.  Trace perihepatic ascites.  He was started on PPI IV twice daily, octreotide, ceftriaxone, CIWA protocol and admitted for further care.      Tobacco use: Denies  Alcohol use: Patient reports he drinks 0.5-1 fifth of alcohol per day for the last few years.  He reports alcohol withdrawal in the past resulting in delirium tremens.  Denies previous seizures.  Illicit drug use: Denies    Last EGD: Denies  Last colonoscopy: Denies  NSAID/ASA use: Occasional, none recently  Anticoagulation use: Denies      Past Medical History:   Diagnosis Date    Hypertension          History reviewed. No pertinent surgical history.    History reviewed. No pertinent family  history.    Social History     Socioeconomic History    Marital status: Single   Occupational History    Occupation: Road [employer]   Tobacco Use    Smoking status: Never     Passive exposure: Never    Smokeless tobacco: Never   Vaping Use    Vaping Use: Never used   Substance and Sexual Activity    Alcohol use: Yes     Alcohol/week: 7.2 oz     Types: 12 Shots of liquor per week    Drug use: Never       Review of systems:  Review of Systems   Constitutional:  Negative for chills, fever and malaise/fatigue.   HENT:  Negative for sinus pain and sore throat.    Respiratory:  Negative for cough and shortness of breath.    Cardiovascular:  Negative for chest pain and leg swelling.   Gastrointestinal:  Positive for heartburn, melena, nausea and vomiting (Hematemesis). Negative for abdominal pain, blood in stool, constipation and diarrhea.   Genitourinary:  Negative for dysuria and flank pain.   Musculoskeletal:  Negative for falls and myalgias.   Neurological:  Negative for tremors, weakness and headaches.   Psychiatric/Behavioral:  Positive for substance abuse. The patient is not nervous/anxious.    All other systems reviewed and are negative.        Physical Exam:  Vitals:    12/14/23 2315 12/14/23 2327 12/15/23 0353 12/15/23 0837   BP: 133/76 135/70 (!) 157/78 (!) 143/80   Pulse: (!) 130 (!) 127 (!) 128 (!) 121   Resp: 18 16 18 18   Temp: 36.2 °C (97.2 °F) 36.5 °C (97.7 °F) 37.4 °C (99.3 °F) 36.9 °C (98.4 °F)   TempSrc: Temporal Temporal Temporal Temporal   SpO2: 92%  92% 92%   Weight:   120 kg (264 lb 8.8 oz)    Height:           Physical Exam  Vitals and nursing note reviewed.   Constitutional:       General: He is awake.      Appearance: Normal appearance. He is well-developed and well-groomed. He is morbidly obese. He is not ill-appearing.   HENT:      Head: Normocephalic.      Nose: Nose normal. No congestion.      Mouth/Throat:      Mouth: Mucous membranes are moist.      Pharynx: Oropharynx is clear. No oropharyngeal  exudate.   Eyes:      General: No scleral icterus.     Extraocular Movements: Extraocular movements intact.      Conjunctiva/sclera: Conjunctivae normal.   Cardiovascular:      Rate and Rhythm: Normal rate and regular rhythm.      Pulses: Normal pulses.      Heart sounds: Normal heart sounds. No murmur heard.  Pulmonary:      Effort: Pulmonary effort is normal. No respiratory distress.      Breath sounds: Normal breath sounds. No wheezing.   Abdominal:      General: Abdomen is flat. Bowel sounds are normal. There is no distension.      Palpations: Abdomen is soft.      Tenderness: There is no abdominal tenderness. There is no guarding.   Musculoskeletal:      Right lower leg: No edema.      Left lower leg: No edema.   Skin:     General: Skin is warm and dry.      Capillary Refill: Capillary refill takes less than 2 seconds.      Coloration: Skin is not jaundiced.   Neurological:      General: No focal deficit present.      Mental Status: He is alert and oriented to person, place, and time. Mental status is at baseline.   Psychiatric:         Mood and Affect: Mood normal.         Behavior: Behavior normal. Behavior is cooperative.         Thought Content: Thought content normal.         Judgment: Judgment normal.           Labs:  Recent Labs     12/15/23  0145 12/15/23  0716   WBC 11.0*  --    RBC 3.03*  --    HEMOGLOBIN 10.3* 9.5*   HEMATOCRIT 29.9* 27.5*   MCV 98.7*  --    MCH 34.0*  --    MCHC 34.4  --    RDW 52.5*  --    PLATELETCT 83*  --    MPV 11.3  --      Recent Labs     12/15/23  0145   SODIUM 137   POTASSIUM 4.4   CHLORIDE 99   CO2 25   GLUCOSE 292*   BUN 14           Recent Labs     12/15/23  0145   ASTSGOT 122*   ALTSGPT 50   TBILIRUBIN 5.4*   IBILIRUBIN 2.9*   DBILIRUBIN 2.5*   ALKPHOSPHAT 117*   GLOBULIN 3.8*         Imaging:  US-RUQ  Narrative:   12/15/2023 3:08 AM    HISTORY/REASON FOR EXAM:  Abnormal Labs, Abdominal pain    TECHNIQUE/EXAM DESCRIPTION AND NUMBER OF VIEWS:  Real-time sonography of the  liver and biliary tree.    COMPARISON: None    FINDINGS:    The liver is irregular in contour. There is no evidence of solid mass lesion. The liver measures 18.43 cm. Liver appears echogenic.    The gallbladder is normal. There is no evidence of cholelithiasis.  The gallbladder wall thickness measures 3.50 mm. There is no pericholecystic fluid.    The common duct measures 3.90 mm.    The pancreas is obscured by bowel gas.    The visualized aorta is normal in caliber.    Intrahepatic IVC is patent.    The portal vein is patent with hepatopetal flow. The MPV measures 1.30 cm cm.    The right kidney measures 14.97 cm.    Trace perihepatic ascites is seen.  Impression: 1.  Hepatomegaly  2.  Echogenic liver with irregular contour favoring changes of cirrhosis.  3.  Gallbladder wall thickening without visualized shadowing stones. Nonspecific findings in the setting of hepatocellular disease, but raise concern for cholecystitis. Could be further evaluated with HIDA scan as clinically appropriate.  4.  Trace perihepatic ascites.            Impressions:  Upper GI bleeding with hematemesis and melena  Acute blood loss anemia  Alcohol abuse  Cirrhosis-seen on imaging  Macrocytic anemia-multifactorial including chronic alcohol abuse and acute blood loss anemia secondary to upper GI bleeding.  Hypertension  Elevated A1c    MDM:  This is a pleasant 28-year-old male with a past medical history as listed above who presented to [facility] with GI bleeding with onset hematemesis and melena.  Patient reports chronic alcohol abuse.  No previous GI bleeding in past.  Emergency department, macrocytic anemia with a hemoglobin of 10.3.  Overnight hemoglobin dropped to 9.5.  No active hematemesis at this time.  Vital stable.  Low suspicion for variceal bleeding. Differentials include esophagitis, gastritis, AVM, Dieulafoy's, PUD, MWT, portal hypertensive gastropathy.  Discussed further evaluation with EGD.  Patient is  agreeable to proceed.  He has been n.p.o. since yesterday.      Recommendations:  Monitor H/H and for signs of rebleeding  Continue PPI, octreotide.  Maintain n.p.o. status  Plan for EGD today  Recommended alcohol cessation.    Discussed with patient, Dr. Naidu, Elvia SPEAR, Dr. King and Dr. Lott.        This note was generated using voice recognition software which has a small chance of producing errors of grammar and possibly content. I have made every reasonable attempt to find and correct any obvious errors, but expect that some may not be found prior to finalization of this note.

## 2023-12-15 NOTE — H&P (VIEW-ONLY)
Date of Consultation:  12/15/2023    Patient: : Mk Virk  MRN: [id number]    Referring Physician:  Cb Naidu MD     GI:WENDY Buckley     Reason for Consultation: Hematemesis, melena, anemia    History of Present Illness:     This is a 28-year-old male with a past medical history including hypertension and alcohol abuse who presented to [facility] on 12/14/2023 with a 1 day history of hematemesis and melena.  Patient reports vomiting approximately 15 times in a 24 hours prior to arrival, 3-4 of these times referring hematemesis.  Patient also reports melena x 1 at home with additional episode of melena since admission.  He reports mild heartburn/burning sensation when vomiting that self resolves on its own and further endorses dry heaving.  He otherwise denies odynophagia, fever, chills, weight loss, abdominal pain.  He denies prior GI history or GI bleeding prior to this.  In the emergency department, leukocytosis of 11, hemoglobin 10.3, 9.5 this morning.  MCV 98.7.  CMP significant for , ALT 50, alk phos 117, total bilirubin 5.4.  Hemoglobin A1c 8.2.  Right upper quadrant ultrasound shows hepatomegaly, i echogenic liver with irregular contour suspicious for cirrhosis, gallbladder wall thickening without visualized stones.  Trace perihepatic ascites.  He was started on PPI IV twice daily, octreotide, ceftriaxone, CIWA protocol and admitted for further care.      Tobacco use: Denies  Alcohol use: Patient reports he drinks 0.5-1 fifth of alcohol per day for the last few years.  He reports alcohol withdrawal in the past resulting in delirium tremens.  Denies previous seizures.  Illicit drug use: Denies    Last EGD: Denies  Last colonoscopy: Denies  NSAID/ASA use: Occasional, none recently  Anticoagulation use: Denies      Past Medical History:   Diagnosis Date    Hypertension          History reviewed. No pertinent surgical history.    History reviewed. No pertinent family  history.    Social History     Socioeconomic History    Marital status: Single   Occupational History    Occupation: Road Tech   Tobacco Use    Smoking status: Never     Passive exposure: Never    Smokeless tobacco: Never   Vaping Use    Vaping Use: Never used   Substance and Sexual Activity    Alcohol use: Yes     Alcohol/week: 7.2 oz     Types: 12 Shots of liquor per week    Drug use: Never       Review of systems:  Review of Systems   Constitutional:  Negative for chills, fever and malaise/fatigue.   HENT:  Negative for sinus pain and sore throat.    Respiratory:  Negative for cough and shortness of breath.    Cardiovascular:  Negative for chest pain and leg swelling.   Gastrointestinal:  Positive for heartburn, melena, nausea and vomiting (Hematemesis). Negative for abdominal pain, blood in stool, constipation and diarrhea.   Genitourinary:  Negative for dysuria and flank pain.   Musculoskeletal:  Negative for falls and myalgias.   Neurological:  Negative for tremors, weakness and headaches.   Psychiatric/Behavioral:  Positive for substance abuse. The patient is not nervous/anxious.    All other systems reviewed and are negative.        Physical Exam:  Vitals:    12/14/23 2315 12/14/23 2327 12/15/23 0353 12/15/23 0837   BP: 133/76 135/70 (!) 157/78 (!) 143/80   Pulse: (!) 130 (!) 127 (!) 128 (!) 121   Resp: 18 16 18 18   Temp: 36.2 °C (97.2 °F) 36.5 °C (97.7 °F) 37.4 °C (99.3 °F) 36.9 °C (98.4 °F)   TempSrc: Temporal Temporal Temporal Temporal   SpO2: 92%  92% 92%   Weight:   120 kg (264 lb 8.8 oz)    Height:           Physical Exam  Vitals and nursing note reviewed.   Constitutional:       General: He is awake.      Appearance: Normal appearance. He is well-developed and well-groomed. He is morbidly obese. He is not ill-appearing.   HENT:      Head: Normocephalic.      Nose: Nose normal. No congestion.      Mouth/Throat:      Mouth: Mucous membranes are moist.      Pharynx: Oropharynx is clear. No oropharyngeal  exudate.   Eyes:      General: No scleral icterus.     Extraocular Movements: Extraocular movements intact.      Conjunctiva/sclera: Conjunctivae normal.   Cardiovascular:      Rate and Rhythm: Normal rate and regular rhythm.      Pulses: Normal pulses.      Heart sounds: Normal heart sounds. No murmur heard.  Pulmonary:      Effort: Pulmonary effort is normal. No respiratory distress.      Breath sounds: Normal breath sounds. No wheezing.   Abdominal:      General: Abdomen is flat. Bowel sounds are normal. There is no distension.      Palpations: Abdomen is soft.      Tenderness: There is no abdominal tenderness. There is no guarding.   Musculoskeletal:      Right lower leg: No edema.      Left lower leg: No edema.   Skin:     General: Skin is warm and dry.      Capillary Refill: Capillary refill takes less than 2 seconds.      Coloration: Skin is not jaundiced.   Neurological:      General: No focal deficit present.      Mental Status: He is alert and oriented to person, place, and time. Mental status is at baseline.   Psychiatric:         Mood and Affect: Mood normal.         Behavior: Behavior normal. Behavior is cooperative.         Thought Content: Thought content normal.         Judgment: Judgment normal.           Labs:  Recent Labs     12/15/23  0145 12/15/23  0716   WBC 11.0*  --    RBC 3.03*  --    HEMOGLOBIN 10.3* 9.5*   HEMATOCRIT 29.9* 27.5*   MCV 98.7*  --    MCH 34.0*  --    MCHC 34.4  --    RDW 52.5*  --    PLATELETCT 83*  --    MPV 11.3  --      Recent Labs     12/15/23  0145   SODIUM 137   POTASSIUM 4.4   CHLORIDE 99   CO2 25   GLUCOSE 292*   BUN 14           Recent Labs     12/15/23  0145   ASTSGOT 122*   ALTSGPT 50   TBILIRUBIN 5.4*   IBILIRUBIN 2.9*   DBILIRUBIN 2.5*   ALKPHOSPHAT 117*   GLOBULIN 3.8*         Imaging:  US-RUQ  Narrative:   12/15/2023 3:08 AM    HISTORY/REASON FOR EXAM:  Abnormal Labs, Abdominal pain    TECHNIQUE/EXAM DESCRIPTION AND NUMBER OF VIEWS:  Real-time sonography of the  liver and biliary tree.    COMPARISON: None    FINDINGS:    The liver is irregular in contour. There is no evidence of solid mass lesion. The liver measures 18.43 cm. Liver appears echogenic.    The gallbladder is normal. There is no evidence of cholelithiasis.  The gallbladder wall thickness measures 3.50 mm. There is no pericholecystic fluid.    The common duct measures 3.90 mm.    The pancreas is obscured by bowel gas.    The visualized aorta is normal in caliber.    Intrahepatic IVC is patent.    The portal vein is patent with hepatopetal flow. The MPV measures 1.30 cm cm.    The right kidney measures 14.97 cm.    Trace perihepatic ascites is seen.  Impression: 1.  Hepatomegaly  2.  Echogenic liver with irregular contour favoring changes of cirrhosis.  3.  Gallbladder wall thickening without visualized shadowing stones. Nonspecific findings in the setting of hepatocellular disease, but raise concern for cholecystitis. Could be further evaluated with HIDA scan as clinically appropriate.  4.  Trace perihepatic ascites.            Impressions:  Upper GI bleeding with hematemesis and melena  Acute blood loss anemia  Alcohol abuse  Cirrhosis-seen on imaging  Macrocytic anemia-multifactorial including chronic alcohol abuse and acute blood loss anemia secondary to upper GI bleeding.  Hypertension  Elevated A1c    MDM:  This is a pleasant 28-year-old male with a past medical history as listed above who presented to [facility] with GI bleeding with onset hematemesis and melena.  Patient reports chronic alcohol abuse.  No previous GI bleeding in past.  Emergency department, macrocytic anemia with a hemoglobin of 10.3.  Overnight hemoglobin dropped to 9.5.  No active hematemesis at this time.  Vital stable.  Low suspicion for variceal bleeding. Differentials include esophagitis, gastritis, AVM, Dieulafoy's, PUD, MWT, portal hypertensive gastropathy.  Discussed further evaluation with EGD.  Patient is  agreeable to proceed.  He has been n.p.o. since yesterday.      Recommendations:  Monitor H/H and for signs of rebleeding  Continue PPI, octreotide.  Maintain n.p.o. status  Plan for EGD today  Recommended alcohol cessation.    Discussed with patient, Dr. Naidu, Elvia SPEAR, Dr. King and Dr. Lott.        This note was generated using voice recognition software which has a small chance of producing errors of grammar and possibly content. I have made every reasonable attempt to find and correct any obvious errors, but expect that some may not be found prior to finalization of this note.

## 2023-12-15 NOTE — H&P
Hospital Medicine History & Physical Note    Date of Service  12/14/2023    Primary Care Physician  No primary care provider on file.      Code Status  Full Code    Chief Complaint  No chief complaint on file.      History of Presenting Illness  Mk Virk is a 28 y.o. male with past medical history of alcohol abuse who presented 12/14/2023 with GI bleed.  Patient initially presented to outside facility complaining of 1 day history of hematemesis.  He had 3-4 episodes where he coughed up approximately 1 cup of bright red blood.  States that it was mixed with bile as well.  He also had an episode of melanotic stools.  States that he has never had this in the past.  Patient is a chronic alcohol abuser.  States that he drinks 1/5 of alcohol daily for the last 3 years.  He has had alcohol withdrawal in the past, resulting in delirium tremens.  Has never had a seizure in the past.  Has never required ICU stay or intubation.  At the outside facility, labs were done, and had a hemoglobin of 12.2.  AST and ALT were 188 and 87 respectively with a T. bili of 6.0 and a lipase of 123.  He was given pantoprazole and octreotide.  He patient was transferred here for higher level of care.    I discussed the plan of care with patient.    Review of Systems  Review of Systems   Constitutional:  Negative for chills and fever.   HENT:  Negative for congestion, ear discharge, ear pain, nosebleeds and sinus pain.    Eyes:  Negative for blurred vision, double vision and photophobia.   Respiratory:  Negative for cough, hemoptysis, sputum production and shortness of breath.    Cardiovascular:  Negative for chest pain, palpitations and orthopnea.   Gastrointestinal:  Positive for abdominal pain, melena, nausea and vomiting. Negative for blood in stool and diarrhea.        Hematemesis   Genitourinary:  Negative for dysuria, flank pain, frequency, hematuria and urgency.   Musculoskeletal:  Negative for back pain and neck pain.    Neurological:  Positive for tremors. Negative for dizziness, tingling, sensory change, speech change, focal weakness, weakness and headaches.   Psychiatric/Behavioral:  Negative for depression, hallucinations, substance abuse and suicidal ideas. The patient is not nervous/anxious.        Past Medical History   has no past medical history on file.    Surgical History   has no past surgical history on file.     Family History  family history is not on file.   Family history reviewed with patient. There is no family history that is pertinent to the chief complaint.     Social History       Allergies  No Known Allergies    Medications  None       Physical Exam  Temp:  [36.8 °C (98.2 °F)] 36.8 °C (98.2 °F)  Pulse:  [137] 137  BP: (144)/(76) 144/76  SpO2:  [94 %] 94 %  Blood Pressure: (!) 144/76   Temperature: 36.8 °C (98.2 °F)   Pulse: (!) 137       Pulse Oximetry: 94 %       Physical Exam  Constitutional:       General: He is not in acute distress.     Appearance: Normal appearance. He is normal weight. He is not ill-appearing, toxic-appearing or diaphoretic.   HENT:      Head: Normocephalic and atraumatic.      Mouth/Throat:      Mouth: Mucous membranes are moist.   Eyes:      Pupils: Pupils are equal, round, and reactive to light.   Cardiovascular:      Rate and Rhythm: Normal rate and regular rhythm.      Pulses: Normal pulses.      Heart sounds: Normal heart sounds. No murmur heard.     No friction rub. No gallop.   Pulmonary:      Effort: Pulmonary effort is normal. No respiratory distress.      Breath sounds: Normal breath sounds. No stridor. No wheezing, rhonchi or rales.   Chest:      Chest wall: No tenderness.   Abdominal:      General: There is no distension.      Palpations: There is no mass.      Tenderness: There is no abdominal tenderness. There is no right CVA tenderness, left CVA tenderness, guarding or rebound.      Hernia: No hernia is present.   Musculoskeletal:         General: No swelling,  "tenderness, deformity or signs of injury.      Right lower leg: No edema.      Left lower leg: No edema.   Skin:     General: Skin is warm and dry.      Capillary Refill: Capillary refill takes less than 2 seconds.      Coloration: Skin is not jaundiced or pale.      Findings: No bruising, erythema, lesion or rash.   Neurological:      General: No focal deficit present.      Mental Status: He is alert and oriented to person, place, and time. Mental status is at baseline.      Cranial Nerves: No cranial nerve deficit.      Sensory: No sensory deficit.      Motor: No weakness.      Coordination: Coordination normal.   Psychiatric:         Mood and Affect: Mood normal.         Laboratory:          No results for input(s): \"ALTSGPT\", \"ASTSGOT\", \"ALKPHOSPHAT\", \"TBILIRUBIN\", \"DBILIRUBIN\", \"GAMMAGT\", \"AMYLASE\", \"LIPASE\", \"ALB\", \"PREALBUMIN\", \"GLUCOSE\" in the last 72 hours.      No results for input(s): \"NTPROBNP\" in the last 72 hours.      No results for input(s): \"TROPONINT\" in the last 72 hours.    Imaging:  US-RUQ    (Results Pending)       X-Ray:  I have personally reviewed the images and compared with prior images.  EKG:  I have personally reviewed the images and compared with prior images.    Assessment/Plan:  Justification for Admission Status  I anticipate this patient will require at least two midnights for appropriate medical management, necessitating inpatient admission because GI bleed in the setting of alcohol abuse, requiring frequent monitoring.  Will need GI consult.  Patient has history of alcohol withdrawal, and started on CIWA protocol    Patient will need a Telemetry bed on MEDICAL service .  The need is secondary to GI bleed and alcohol abuse with complication.    * GI bleed  Assessment & Plan  Patient presents with 1 day history of hematemesis.  Likely in setting of alcohol abuse.  Hemoglobin currently appears stable.  Will trend every 4 hours.  Transfuse for hemoglobin less than 4.  Pantoprazole 40 " mg IV twice daily  Starting patient on maintenance IV fluids  Patient already got a dose of octreotide at the outside facility.  Holding off on octreotide at this time.  Day team to consult gastroenterology service.    Alcohol abuse  Assessment & Plan  Patient states he drinks 1/5 of alcohol daily.  He is been abusing alcohol for 3 years now.  He has had episodes of alcohol withdrawal where he gets delirium tremens.  Has never had seizure in the past  Starting patient on CIWA protocol  IV thiamine, folic acid, multivitamins  Patient will need monitoring with daily labs.    Nausea and vomiting  Assessment & Plan  Secondary to alcohol withdrawal.  Starting patient on IV and p.o. antiemetics    Class 2 obesity due to excess calories without serious comorbidity with body mass index (BMI) of 36.0 to 36.9 in adult  Assessment & Plan  Counseled patient about weight loss strategies including diet and exercise.  Patient will need outpatient follow-up primary care physician    Elevated LFTs  Assessment & Plan  Labs consistent with alcoholic hepatitis.  Continue to trend.        VTE prophylaxis: SCDs/TEDs

## 2023-12-15 NOTE — PROGRESS NOTES
Pt transported onto unit. Pt A&Ox4. Pt tachycardic, blood pressure elevated. Pt declines any pain at this time. Pt oriented to room and call light. Seizure precautions in place. CIWA protocol initiated.

## 2023-12-15 NOTE — ANESTHESIA PREPROCEDURE EVALUATION
Case: [id number] Anesthesia Start Date/Time: 12/15/23 1359    Procedure: GASTROSCOPY (Esophagus)    Anesthesia type: MAC    Pre-op diagnosis: Hematemesis, melena, anemia    Location: [facility] ROOM 26 / SURGERY SAME DAY [facility]    Surgeons: Benson Lott M.D.            Relevant Problems   No relevant active problems       Physical Exam    Airway   Mallampati: II  TM distance: >3 FB  Neck ROM: full       Cardiovascular - normal exam  Rhythm: regular  Rate: normal  (-) murmur     Dental - normal exam           Pulmonary - normal exam  Breath sounds clear to auscultation     Abdominal    Neurological - normal exam                   Anesthesia Plan    ASA 3   ASA physical status 3 criteria: alcohol and/or substance dependence or abuse    Plan - MAC               Induction: intravenous    Postoperative Plan: Postoperative administration of opioids is intended.    Pertinent diagnostic labs and testing reviewed    Informed Consent:    Anesthetic plan and risks discussed with patient.    Use of blood products discussed with: patient whom consented to blood products.

## 2023-12-15 NOTE — OP REPORT
OPERATIVE REPORT    PATIENT:   Mk Virk   1995       PREOPERATIVE DIAGNOSES/INDICATIONS: Anemia due to GI blood loss, hematemesis, melena    PROCEDURE: EGD with variceal band ligation    PHYSICIAN:  Benson Lott MD     ANESTHESIA:  Per anesthesiologist.  TIVA    LOCATION: [facility]    CONSENT: The risks, benefits and alternatives of the procedure were discussed in detail. The risks include and are not limited to bleeding, infection, perforation, missed lesions, and sedations risks (cardiopulmonary compromise and allergic reaction to medications).    DESCRIPTION:   The patient presented to the operating room.  A time out was performed prior to beginning the procedure.   The patient was placed in the left lateral recumbent position. Patient was sedated by anesthesia: TIVA.    OPERATIVE FINDINGS:    Esophagus: Grade 3 esophageal varices in the distal one third of the esophagus.  High risk stigmata present.  No active bleeding.  Variceal band ligation x 7 pursued.  Incomplete decompression but adequate.  Stomach: No gastric varices seen on retroflexion.  Diffuse portal hypertensive gastropathy, mild to moderate.  Duodenum: Normal to the second portion.      Blood loss: None    The patient tolerated the procedure well.      There were no immediate complications.    Pathology samples obtained: None    IMPRESSION:  Grade 3 esophageal varices, band ligation x 7  Portal hypertensive gastropathy    RECOMMENDATIONS:  Full liquids  Advance diet tomorrow as tolerated  Repeat EGD in 4 to 6 weeks for repeat treatment  Continue octreotide x 72 hours.  Continue ceftriaxone.  Outpatient GI follow-up.

## 2023-12-16 NOTE — PROGRESS NOTES
Monitor Summary:   Rhythm: SR-ST  Rate:   Measurement: .13/.09/.32  Ectopy: none  No strip uploaded

## 2023-12-16 NOTE — PROGRESS NOTES
Gastroenterology Progress Note               Author:  Benson Lott M.D. Date & Time Created: 12/16/2023 8:32 AM       Patient ID:  Name:             Mk Virk    YOB: 1995  Age:                 28 y.o.  male  MRN:               [id number]      Interval History:  Patient having emesis overnight which was nonbloody nonbilious.  Otherwise tolerating a gentle diet.  Remains on octreotide.  Hemoglobin stable.  Chest pain minimal.      Hospital Medications:  Current Facility-Administered Medications   Medication Dose Frequency Provider Last Rate Last Admin    octreotide (SandoSTATIN) 1,250 mcg in  mL Infusion  50 mcg/hr Continuous Cb Naidu M.D. 10 mL/hr at 12/15/23 0843 50 mcg/hr at 12/15/23 0843    cefTRIAXone (Rocephin) syringe 2,000 mg  2,000 mg Q24HRS Cb Naidu M.D.   2,000 mg at 12/16/23 0528    propranolol (Inderal) tablet 10 mg  10 mg TWICE DAILY Cb Naidu M.D.   10 mg at 12/16/23 0520    Pharmacy Consult Request ...Pain Management Review 1 Each  1 Each PHARMACY TO DOSE Cb Naidu M.D.        oxyCODONE immediate-release (Roxicodone) tablet 5 mg  5 mg Q3HRS PRN Cb Naidu M.D.        Or    oxyCODONE immediate release (Roxicodone) tablet 10 mg  10 mg Q3HRS PRN Cb Naidu M.D.   10 mg at 12/16/23 0108    Or    morphine 4 MG/ML injection 4 mg  4 mg Q3HRS PRN Cb Naidu M.D.        pantoprazole (Protonix) injection 40 mg  40 mg BID Henok Hernandez M.D.   40 mg at 12/16/23 0520    LORazepam (Ativan) tablet 0.5 mg  0.5 mg Q4HRS PRN Henok Hernandez M.D.   0.5 mg at 12/15/23 1504    LORazepam (Ativan) tablet 1 mg  1 mg Q4HRS PRN Henok Hernandez M.D.   1 mg at 12/15/23 0750    LORazepam (Ativan) tablet 2 mg  2 mg Q2HRS PRN Henok Hernandez M.D.        LORazepam (Ativan) tablet 3 mg  3 mg Q HOUR PRN Henok Hernandez M.D.        LORazepam (Ativan) tablet 4 mg  4 mg Q15 MIN PRN Henok Hernandez M.D.        Or    diazePAM (Valium) injection 10 mg  10 mg Q15 MIN  "MISBAH Hernandez M.D.        thiamine (Vitamin B-1) tablet 100 mg  100 mg DAILY Henok Hernandez M.D.   100 mg at 12/16/23 0519    And    multivitamin tablet 1 Tablet  1 Tablet DAILY Henok Hernandez M.D.   1 Tablet at 12/16/23 0519    And    folic acid (Folvite) tablet 1 mg  1 mg DAILY Henok Hernandez M.D.   1 mg at 12/16/23 0520    senna-docusate (Pericolace Or Senokot S) 8.6-50 MG per tablet 2 Tablet  2 Tablet BID Henok Hernandez M.D.   2 Tablet at 12/16/23 0519    And    polyethylene glycol/lytes (Miralax) Packet 1 Packet  1 Packet QDAY MISBAH Hernandez M.D.        And    magnesium hydroxide (Milk Of Magnesia) suspension 30 mL  30 mL QDAY MISBAH Hernandez M.D.        And    bisacodyl (Dulcolax) suppository 10 mg  10 mg QDAY MISBAH Hernandez M.D.        ondansetron (Zofran) syringe/vial injection 4 mg  4 mg Q4HRS MISBAH Hernandez M.D.   4 mg at 12/16/23 0520    ondansetron (Zofran ODT) dispertab 4 mg  4 mg Q4HRS MISBAH Hernandez M.D.        promethazine (Phenergan) tablet 12.5-25 mg  12.5-25 mg Q4HRS MISBAH Hernandez M.D.        promethazine (Phenergan) suppository 12.5-25 mg  12.5-25 mg Q4HRS MISBAH Hernandez M.D.        prochlorperazine (Compazine) injection 5-10 mg  5-10 mg Q4HRS MISBAH Hernandez M.D.   10 mg at 12/15/23 2317   Last reviewed on 12/14/2023 10:43 PM by Lima Wang, Student       Vital signs:  Weight/BMI: Body mass index is 37.05 kg/m².  /63   Pulse (!) 57   Temp 36.8 °C (98.2 °F) (Temporal)   Resp 18   Ht 1.803 m (5' 11\")   Wt 121 kg (265 lb 10.5 oz)   SpO2 95%   Vitals:    12/15/23 2030 12/16/23 0056 12/16/23 0547 12/16/23 0745   BP: 128/69 112/60 128/67 128/63   Pulse: 66 72 75 (!) 57   Resp: 16 18 18 18   Temp: 37.1 °C (98.8 °F) 36.6 °C (97.9 °F) 36.5 °C (97.7 °F) 36.8 °C (98.2 °F)   TempSrc: Temporal Temporal Temporal Temporal   SpO2: 96% 93% 90% 95%   Weight:   121 kg (265 lb 10.5 oz)    Height:         Oxygen Therapy:  Pulse Oximetry: 95 %, " O2 (LPM): 1, O2 Delivery Device: None - Room Air    Intake/Output Summary (Last 24 hours) at 12/16/2023 0832  Last data filed at 12/16/2023 0116  Gross per 24 hour   Intake --   Output 1000 ml   Net -1000 ml         Physical Exam:  Physical Exam  Constitutional:       Appearance: He is ill-appearing. He is not toxic-appearing.   Eyes:      General: Scleral icterus present.   Cardiovascular:      Rate and Rhythm: Normal rate and regular rhythm.   Pulmonary:      Effort: Pulmonary effort is normal.   Abdominal:      General: Bowel sounds are normal. There is no distension.      Palpations: Abdomen is soft.   Skin:     General: Skin is warm.   Neurological:      General: No focal deficit present.      Mental Status: He is alert.   Psychiatric:         Mood and Affect: Mood normal.         Behavior: Behavior normal.           Labs:  Recent Labs     12/15/23  0145   SODIUM 137   POTASSIUM 4.4   CHLORIDE 99   CO2 25   BUN 14   CREATININE 0.60   MAGNESIUM 1.5   PHOSPHORUS 2.9   CALCIUM 7.1*     Recent Labs     12/15/23  0145   ALTSGPT 50   ASTSGOT 122*   ALKPHOSPHAT 117*   TBILIRUBIN 5.4*   DBILIRUBIN 2.5*   LIPASE 32   GLUCOSE 292*     Recent Labs     12/15/23  0145   WBC 11.0*   NEUTSPOLYS 72.00   LYMPHOCYTES 14.50*   MONOCYTES 12.10   EOSINOPHILS 0.10   BASOPHILS 0.80   ASTSGOT 122*   ALTSGPT 50   ALKPHOSPHAT 117*   TBILIRUBIN 5.4*     Recent Labs     12/15/23  0145 12/15/23  0716 12/15/23  1127 12/15/23  1729 12/15/23  1922 12/16/23  0107   RBC 3.03*  --   --   --   --   --    HEMOGLOBIN 10.3*   < > 9.8* 9.3* 9.3* 9.2*   HEMATOCRIT 29.9*   < > 28.3* 27.1*  --  27.4*   PLATELETCT 83*  --   --   --   --   --     < > = values in this interval not displayed.     Recent Results (from the past 24 hour(s))   HEMOGLOBIN AND HEMATOCRIT    Collection Time: 12/15/23 11:27 AM   Result Value Ref Range    Hemoglobin 9.8 (L) 14.0 - 18.0 g/dL    Hematocrit 28.3 (L) 42.0 - 52.0 %   HEMOGLOBIN AND HEMATOCRIT    Collection Time: 12/15/23   5:29 PM   Result Value Ref Range    Hemoglobin 9.3 (L) 14.0 - 18.0 g/dL    Hematocrit 27.1 (L) 42.0 - 52.0 %   HGB    Collection Time: 12/15/23  7:22 PM   Result Value Ref Range    Hemoglobin 9.3 (L) 14.0 - 18.0 g/dL   HEMOGLOBIN AND HEMATOCRIT    Collection Time: 12/16/23  1:07 AM   Result Value Ref Range    Hemoglobin 9.2 (L) 14.0 - 18.0 g/dL    Hematocrit 27.4 (L) 42.0 - 52.0 %       Radiology Review:  US-RUQ  Narrative:   12/15/2023 3:08 AM    HISTORY/REASON FOR EXAM:  Abnormal Labs, Abdominal pain    TECHNIQUE/EXAM DESCRIPTION AND NUMBER OF VIEWS:  Real-time sonography of the liver and biliary tree.    COMPARISON: None    FINDINGS:    The liver is irregular in contour. There is no evidence of solid mass lesion. The liver measures 18.43 cm. Liver appears echogenic.    The gallbladder is normal. There is no evidence of cholelithiasis.  The gallbladder wall thickness measures 3.50 mm. There is no pericholecystic fluid.    The common duct measures 3.90 mm.    The pancreas is obscured by bowel gas.    The visualized aorta is normal in caliber.    Intrahepatic IVC is patent.    The portal vein is patent with hepatopetal flow. The MPV measures 1.30 cm cm.    The right kidney measures 14.97 cm.    Trace perihepatic ascites is seen.  Impression: 1.  Hepatomegaly  2.  Echogenic liver with irregular contour favoring changes of cirrhosis.  3.  Gallbladder wall thickening without visualized shadowing stones. Nonspecific findings in the setting of hepatocellular disease, but raise concern for cholecystitis. Could be further evaluated with HIDA scan as clinically appropriate.  4.  Trace perihepatic ascites.        MDM (Data Review):   -Records reviewed and summarized in current documentation  -I personally reviewed and interpreted the laboratory results  -I personally reviewed the radiology images  -I have personally reviewed medications      Hospital Problem List:  Active Hospital Problems    Diagnosis     Acute blood loss  anemia [D62]     GI bleed [K92.2]     Elevated LFTs [R79.89]     Alcohol dependence (HCC) [F10.20]     Class 2 obesity due to excess calories without serious comorbidity with body mass index (BMI) of 36.0 to 36.9 in adult [E66.09, Z68.36]        Assessment/Recommendations:    28-year-old male with alcohol abuse and portal hypertension based on large varices identified during endoscopy yesterday.  Patient has findings consistent with alcoholic hepatitis.  Highly likely to have cirrhosis.  No cross-sectional imaging has been pursued.  Patient counseled that he needs to avoid all alcohol in the future.      He is going to need GI follow-up in the outpatient setting.  He lives a couple hours away.  He can be followed up in the GI community ([facility] or GI).    I am going to defer to hospitalist how much of the hepatology workup should be done in house versus outpatient follow-up. I have ordered a few tests which are important to long term management- detailed below.    Recommendations:  Octreotide for 72 hours.  Consider cross-sectional imaging (CT A/P with contrast).  2 g sodium restriction diet.  Avoidance of all alcohol going forward.  Nonselective beta-blocker can be considered at time of discharge.  Repeat EGD in 6 weeks for retreatment.  Outpt GI follow up with [facility] or GI    -    I am ordering CMP, hepatotropic virus panel, INR for today.    Patient can be immunized as an outpatient for hepatitis a and B as needed.  I have held q6 hr hb / hct checks- can be followed daily unless signs of rebleeding develop.

## 2023-12-16 NOTE — CARE PLAN
The patient is Stable - Low risk of patient condition declining or worsening    Shift Goals  Clinical Goals: CIWA, safety, monitor H/H  Patient Goals: Rest  Family Goals: not present    Progress made toward(s) clinical / shift goals:    Problem: Optimal Care for Alcohol Withdrawal  Goal: Optimal Care for the alcohol withdrawal patient  Outcome: Progressing  Note: CIWA protocol in place.     Problem: Seizure Precautions  Goal: Implementation of seizure precautions  Outcome: Progressing  Note: Seizure precautions in place.      Problem: Fall Risk  Goal: Patient will remain free from falls  Outcome: Progressing  Note: Treaded socks and bed/strip alarm on, side rails up x 2. Call light within reach. Pt educated to call for assistance. Reinforce as needed. Continue to monitor.          Patient is not progressing towards the following goals:

## 2023-12-16 NOTE — PROGRESS NOTES
Hospital Medicine Daily Progress Note    Date of Service  12/16/2023    Chief Complaint  GI bleed    Hospital Course  Mk Virk is a 28 y.o. male with past medical history of chronic alcohol dependence with history of alcohol withdrawal, who presented 12/14/2023 with 3-4 episodes hematemesis in the last 1 day.  He also had an episode of melanotic stools.  He initially presented at an outside facility where hemoglobin was 12.2,  and ALT 87 with bilirubin of 6 and lipase of 123.  Patient was given pantoprazole and octreotide and was transferred for higher level of care.  Repeat hemoglobin on arrival was 10.3.  WBC 11.  Right upper quadrant ultrasound showed nonspecific gallbladder wall thickening without visualized shadowing stones, with hepatomegaly, echogenic liver with irregular contour favoring changes of cirrhosis, and trace perihepatic ascites. Patient started on IV Protonix, octreotide, prophylactic ceftriaxone, and propranolol.  He is started on CIWA protocol.  Hemoglobin trended down.  GI was consulted who recommended EGD    Interval Problem Update  12/16/2023 - I reviewed the patient's chart. There were no significant overnight events. Remains hemodynamically stable and afebrile. Stable on RA. CIWA 4.  Hemoglobin stable at 9.2.  Patient underwent EGD yesterday 12/16/2023, which showed grade 3 esophageal varices in the distal one third of the esophagus with high risk stigmata present but no active bleeding, with no gastric varices but with noted mild to moderate diffuse portal hypertensive gastropathy, and had variceal band ligation x 7 with incomplete decompression but adequate.  GI recommended 72 hours of IV octreotide.  Maintaining sinus rhythm on telemetry.    > I have personally seen and examined the patient today.  He has no further hematemesis or vomiting.  No chest pain.  No abdominal pain.  No bowel movements yet today.  Not short of breath.    I personally reviewed all lab results  mentioned above. Prior medical records from this institution and outside facilities were independently reviewed as noted. I also personally reviewed all ER physician and consultant recommendations and plans as documented above. History was independently obtained by myself. I have discussed this patient's plan of care and discharge plan at IDT rounds today with Case Management, Nursing, Nursing leadership, and other members of the IDT team.    Consultants/Specialty  GI    Code Status  Full Code    Disposition  The patient is not medically cleared for discharge to home or a post-acute facility.      Anticipate discharge to home once medically cleared.  I have placed the appropriate orders for post-discharge needs.    Review of Systems  ROS     Pertinent positives/negatives as mentioned above.     A complete review of systems was personally done by me. All other systems were negative.       Physical Exam  Temp:  [36.5 °C (97.7 °F)-37.4 °C (99.3 °F)] 36.8 °C (98.2 °F)  Pulse:  [57-83] 63  Resp:  [15-18] 18  BP: (111-145)/(57-75) 118/57  SpO2:  [85 %-98 %] 96 %    Physical Exam  Vitals reviewed.   Constitutional:       General: He is not in acute distress.     Appearance: Normal appearance. He is obese. He is not toxic-appearing or diaphoretic.      Comments: Body mass index is 36.9 kg/m².   HENT:      Head: Normocephalic and atraumatic.      Right Ear: External ear normal.      Left Ear: External ear normal.      Mouth/Throat:      Mouth: Mucous membranes are moist.      Pharynx: No oropharyngeal exudate.   Eyes:      General: No scleral icterus.     Extraocular Movements: Extraocular movements intact.      Conjunctiva/sclera: Conjunctivae normal.      Pupils: Pupils are equal, round, and reactive to light.   Cardiovascular:      Rate and Rhythm: Normal rate and regular rhythm.      Heart sounds: Normal heart sounds. No murmur heard.     No gallop.   Pulmonary:      Effort: Pulmonary effort is normal. No respiratory  distress.      Breath sounds: Normal breath sounds. No stridor. No wheezing, rhonchi or rales.   Chest:      Chest wall: No tenderness.   Abdominal:      General: Bowel sounds are normal. There is no distension.      Palpations: Abdomen is soft. There is no mass.      Tenderness: There is no abdominal tenderness. There is no guarding or rebound.   Musculoskeletal:         General: No swelling. Normal range of motion.      Cervical back: Normal range of motion and neck supple.      Right lower leg: No edema.      Left lower leg: No edema.   Lymphadenopathy:      Cervical: No cervical adenopathy.   Skin:     General: Skin is warm and dry.      Coloration: Skin is not jaundiced.      Findings: No rash.   Neurological:      General: No focal deficit present.      Mental Status: He is alert and oriented to person, place, and time.      Cranial Nerves: No cranial nerve deficit.   Psychiatric:         Mood and Affect: Mood normal.         Behavior: Behavior normal.         Thought Content: Thought content normal.         Judgment: Judgment normal.       I have performed the physical examination today 12/16/2023.  In review of yesterday's note, there are no new changes except as documented above.      Fluids    Intake/Output Summary (Last 24 hours) at 12/16/2023 1221  Last data filed at 12/16/2023 1000  Gross per 24 hour   Intake 365.27 ml   Output 1200 ml   Net -834.73 ml       Laboratory  Recent Labs     12/15/23  0145 12/15/23  0716 12/15/23  1729 12/15/23  1922 12/16/23  0107 12/16/23  0857   WBC 11.0*  --   --   --   --   --    RBC 3.03*  --   --   --   --   --    HEMOGLOBIN 10.3*   < > 9.3* 9.3* 9.2* 9.5*   HEMATOCRIT 29.9*   < > 27.1*  --  27.4* 28.0*   MCV 98.7*  --   --   --   --   --    MCH 34.0*  --   --   --   --   --    MCHC 34.4  --   --   --   --   --    RDW 52.5*  --   --   --   --   --    PLATELETCT 83*  --   --   --   --   --    MPV 11.3  --   --   --   --   --     < > = values in this interval not  displayed.     Recent Labs     12/15/23  0145 12/16/23  0957   SODIUM 137 136   POTASSIUM 4.4 3.8   CHLORIDE 99 102   CO2 25 27   GLUCOSE 292* 185*   BUN 14 15   CREATININE 0.60 0.53   CALCIUM 7.1* 7.5*     Recent Labs     12/16/23  0957   INR 1.98*         Recent Labs     12/15/23  0145   TRIGLYCERIDE 101   HDL 27*   LDL 94       Imaging  US-RUQ   Final Result         1.  Hepatomegaly   2.  Echogenic liver with irregular contour favoring changes of cirrhosis.   3.  Gallbladder wall thickening without visualized shadowing stones. Nonspecific findings in the setting of hepatocellular disease, but raise concern for cholecystitis. Could be further evaluated with HIDA scan as clinically appropriate.   4.  Trace perihepatic ascites.           Assessment/Plan  * GI bleed- (present on admission)  Assessment & Plan  Considering for variceal bleed given cirrhosis and alcohol dependence.  Hemoglobin has trended down although remains above transfusion threshold.  Maintaining good hemodynamics.  S/p EGD 12/16/2023, which showed grade 3 esophageal varices in the distal one third of the esophagus with high risk stigmata present but no active bleeding, with no gastric varices but with noted mild to moderate diffuse portal hypertensive gastropathy, and had variceal band ligation x 7 with incomplete decompression but adequate.   Continue IV octreotide drip for 72 hours.  Continue IV Protonix twice daily.    Continue prophylactic ceftriaxone.  Continue propranolol.  Continue to monitor hemoglobin/hematocrit every 12 hours and transfuse as needed.  Close hemodynamic monitoring.  He is counseled extensively against further alcohol use.    Acute blood loss anemia- (present on admission)  Assessment & Plan  Due to acute GI bleed.  Hemoglobin has trended down, but remains above transfusion threshold.  Monitor hemoglobin/hematocrit every 12 hours.  Restrictive transfusion strategy.    Alcohol dependence (HCC)- (present on  admission)  Assessment & Plan  Drinks a fifth daily. He is been abusing alcohol for 3 years now.  He has had episodes of alcohol withdrawal where he gets delirium tremens.  Has never had seizure in the past.  Currently not showing signs of alcohol withdrawal, but at very high risk to withdraw.  Continue as needed Ativan for CIWA protocol.  If starts to withdraw, will start him on scheduled Librium.  Continue thiamine, folate and multivitamins.  Clinically monitor closely.    Elevated LFTs- (present on admission)  Assessment & Plan  Labs consistent with alcoholic hepatitis.  Continue to trend.  Check viral hepatitis panel.    Class 2 obesity due to excess calories without serious comorbidity with body mass index (BMI) of 36.0 to 36.9 in adult- (present on admission)  Assessment & Plan  Body mass index is 36.9 kg/m²..   on weight loss.  Outpatient referral for outpatient weight management.         VTE prophylaxis: SCD      My total time spent caring for the patient on the day of the encounter was 50 minutes. This does not include time spent on separately billable procedures/tests.

## 2023-12-16 NOTE — PROGRESS NOTES
Bedside report received from Elvia SPEAR. Pt A&Ox4. SR 68 w/ BBB on the monitors. POC discussed with pt. Pt verbalizes understanding. Call light and belongings within reach. Bed locked and in lowest position. Alarm and fall precautions in place.

## 2023-12-17 PROBLEM — D72.829 LEUKOCYTOSIS: Status: ACTIVE | Noted: 2023-01-01

## 2023-12-17 PROBLEM — K74.60 CIRRHOSIS (HCC): Status: ACTIVE | Noted: 2023-01-01

## 2023-12-17 NOTE — CARE PLAN
The patient is Stable - Low risk of patient condition declining or worsening    Shift Goals  Clinical Goals: CIWA, monitor for any signs of bleeding  Patient Goals: Go home  Family Goals: not present    Progress made toward(s) clinical / shift goals:    Problem: Optimal Care for Alcohol Withdrawal  Goal: Optimal Care for the alcohol withdrawal patient  Outcome: Progressing  Note: CIWA protocol in place.      Problem: Seizure Precautions  Goal: Implementation of seizure precautions  Outcome: Progressing  Note: Seizure precautions in place.     Problem: Fall Risk  Goal: Patient will remain free from falls  12/17/2023 0154 by Argenis Ca, R.N.  Outcome: Progressing  12/17/2023 0153 by Argenis Ca, R.N.  Note: Treaded socks and bed/strip alarm on, side rails up x 2. Call light within reach. Pt educated to call for assistance. Reinforce as needed. Continue to monitor.           Patient is not progressing towards the following goals:

## 2023-12-17 NOTE — PROGRESS NOTES
Gastroenterology Progress Note               Author:  WENDY Buckley Date & Time Created: 12/17/2023 1:45 PM       Patient ID:  Name:             Mk Virk    YOB: 1995  Age:                 28 y.o.  male  MRN:               [id number]        Medical Decision Making, by Problem:  Active Hospital Problems    Diagnosis     Leukocytosis [D72.829]     Acute blood loss anemia [D62]     GI bleed [K92.2]     Cirrhosis (HCC) [K74.60]     Alcohol dependence (HCC) [F10.20]     Class 2 obesity due to excess calories without serious comorbidity with body mass index (BMI) of 36.0 to 36.9 in adult [E66.09, Z68.36]            Presenting Chief Complaint:  Hematemesis, melena, anemia     History of Present Illness:      This is a 28-year-old male with a past medical history including hypertension and alcohol abuse who presented to [facility] on 12/14/2023 with a 1 day history of hematemesis and melena.  Patient reports vomiting approximately 15 times in a 24 hours prior to arrival, 3-4 of these times referring hematemesis.  Patient also reports melena x 1 at home with additional episode of melena since admission.  He reports mild heartburn/burning sensation when vomiting that self resolves on its own and further endorses dry heaving.  He otherwise denies odynophagia, fever, chills, weight loss, abdominal pain.  He denies prior GI history or GI bleeding prior to this.  In the emergency department, leukocytosis of 11, hemoglobin 10.3, 9.5 this morning.  MCV 98.7.  CMP significant for , ALT 50, alk phos 117, total bilirubin 5.4.  Hemoglobin A1c 8.2.  Right upper quadrant ultrasound shows hepatomegaly, i echogenic liver with irregular contour suspicious for cirrhosis, gallbladder wall thickening without visualized stones.  Trace perihepatic ascites.  He was started on PPI IV twice daily, octreotide, ceftriaxone, CIWA protocol and admitted for further care.         Interval  History:  12/16/2023: Patient having emesis overnight which was nonbloody nonbilious. Otherwise tolerating a gentle diet. Remains on octreotide. Hemoglobin stable. Chest pain minimal.     12/17/2023: Patient seen at bedside.  Reports feeling well without nausea, vomiting, abdominal pain.  Denies bowel movement.  Hemoglobin stable.        Hospital Medications:  Current Facility-Administered Medications   Medication Dose Frequency Provider Last Rate Last Admin    octreotide (SandoSTATIN) 1,250 mcg in  mL Infusion  50 mcg/hr Continuous Cb Naidu M.D. 10 mL/hr at 12/16/23 1118 50 mcg/hr at 12/16/23 1118    cefTRIAXone (Rocephin) syringe 2,000 mg  2,000 mg Q24HRS Cb Naidu M.D.   2,000 mg at 12/17/23 0528    propranolol (Inderal) tablet 10 mg  10 mg TWICE DAILY Cb Naidu M.D.   10 mg at 12/17/23 0526    Pharmacy Consult Request ...Pain Management Review 1 Each  1 Each PHARMACY TO DOSE Cb Naidu M.D.        oxyCODONE immediate-release (Roxicodone) tablet 5 mg  5 mg Q3HRS PRN bC Naidu M.D.   5 mg at 12/16/23 1602    Or    oxyCODONE immediate release (Roxicodone) tablet 10 mg  10 mg Q3HRS PRN Cb Naidu M.D.   10 mg at 12/17/23 0403    Or    morphine 4 MG/ML injection 4 mg  4 mg Q3HRS PRN Cb Naidu M.D.        pantoprazole (Protonix) injection 40 mg  40 mg BID Henok Hernandez M.D.   40 mg at 12/17/23 0526    LORazepam (Ativan) tablet 0.5 mg  0.5 mg Q4HRS PRJAYY Hernandez M.D.   0.5 mg at 12/15/23 1504    LORazepam (Ativan) tablet 1 mg  1 mg Q4HRS PRN Henok Hernandez M.D.   1 mg at 12/15/23 0750    LORazepam (Ativan) tablet 2 mg  2 mg Q2HRS PRN Henok Hernandez M.D.        LORazepam (Ativan) tablet 3 mg  3 mg Q HOUR PRN Henok Hernandez M.D.        LORazepam (Ativan) tablet 4 mg  4 mg Q15 MIN PRN Henok Hernandez M.D.        Or    diazePAM (Valium) injection 10 mg  10 mg Q15 MIN PRN Henok Hernandez M.D.        thiamine (Vitamin B-1) tablet 100 mg  100 mg DAILY Henok Hernandez  M.D.   100 mg at 12/17/23 0526    And    multivitamin tablet 1 Tablet  1 Tablet DAILY Henok Hernandez M.D.   1 Tablet at 12/17/23 0526    And    folic acid (Folvite) tablet 1 mg  1 mg DAILY Henok Hernandez M.D.   1 mg at 12/17/23 0526    senna-docusate (Pericolace Or Senokot S) 8.6-50 MG per tablet 2 Tablet  2 Tablet BID Henok Hernandez M.D.   2 Tablet at 12/17/23 0526    And    polyethylene glycol/lytes (Miralax) Packet 1 Packet  1 Packet QDAY MISBAH Hernandez M.D.        And    magnesium hydroxide (Milk Of Magnesia) suspension 30 mL  30 mL QDAY MISBAH Hernandez M.D.        And    bisacodyl (Dulcolax) suppository 10 mg  10 mg QDAY MISBAH Hernandez M.D.        ondansetron (Zofran) syringe/vial injection 4 mg  4 mg Q4HRS MISBAH Hernandez M.D.   4 mg at 12/16/23 1805    ondansetron (Zofran ODT) dispertab 4 mg  4 mg Q4HRS MISBAH Hernandez M.D.        promethazine (Phenergan) tablet 12.5-25 mg  12.5-25 mg Q4HRS MISBAH Hernandez M.D.        promethazine (Phenergan) suppository 12.5-25 mg  12.5-25 mg Q4HRS MISBAH Hernandez M.D.        prochlorperazine (Compazine) injection 5-10 mg  5-10 mg Q4HRS MISBAH Hernandez M.D.   10 mg at 12/17/23 0532   Last reviewed on 12/14/2023 10:43 PM by Lima Wang, Student       Review of Systems:  Review of Systems   Constitutional:  Negative for chills, fever and malaise/fatigue.   HENT:  Negative for congestion and sore throat.    Respiratory:  Negative for cough and shortness of breath.    Cardiovascular:  Negative for chest pain and leg swelling.   Gastrointestinal:  Negative for abdominal pain, blood in stool, constipation, diarrhea, melena, nausea and vomiting.   Genitourinary:  Negative for dysuria and flank pain.   Musculoskeletal:  Negative for falls and myalgias.   Neurological:  Negative for weakness and headaches.   Psychiatric/Behavioral:  Positive for substance abuse. The patient is not nervous/anxious.    All other systems reviewed and are  "negative.        Vital signs:  Weight/BMI: Body mass index is 37.33 kg/m².  /55   Pulse 62   Temp 36.9 °C (98.4 °F) (Temporal)   Resp 16   Ht 1.803 m (5' 11\")   Wt 121 kg (267 lb 10.2 oz)   SpO2 91%   Vitals:    12/17/23 0033 12/17/23 0359 12/17/23 0741 12/17/23 1130   BP: 132/65 131/74 123/59 119/55   Pulse: 72 67 66 62   Resp: 17 16 17 16   Temp: 36.7 °C (98 °F) 36.9 °C (98.4 °F) 36.4 °C (97.5 °F) 36.9 °C (98.4 °F)   TempSrc: Temporal Temporal Temporal Temporal   SpO2: 91% 98% 96% 91%   Weight: 121 kg (267 lb 10.2 oz)      Height:         Oxygen Therapy:  Pulse Oximetry: 91 %, O2 (LPM): 0, O2 Delivery Device: None - Room Air    Intake/Output Summary (Last 24 hours) at 12/17/2023 1345  Last data filed at 12/17/2023 1028  Gross per 24 hour   Intake 840 ml   Output 300 ml   Net 540 ml         Physical Exam:  Physical Exam  Vitals and nursing note reviewed.   Constitutional:       Appearance: Normal appearance. He is obese.   HENT:      Head: Normocephalic.      Nose: Nose normal. No congestion.      Mouth/Throat:      Mouth: Mucous membranes are moist.      Pharynx: Oropharynx is clear.   Eyes:      Extraocular Movements: Extraocular movements intact.      Conjunctiva/sclera: Conjunctivae normal.   Cardiovascular:      Rate and Rhythm: Normal rate and regular rhythm.      Pulses: Normal pulses.      Heart sounds: Normal heart sounds. No murmur heard.  Pulmonary:      Effort: Pulmonary effort is normal. No respiratory distress.      Breath sounds: Normal breath sounds.   Abdominal:      General: Abdomen is flat. Bowel sounds are normal. There is no distension.      Palpations: Abdomen is soft.      Tenderness: There is no abdominal tenderness.   Musculoskeletal:      Right lower leg: No edema.      Left lower leg: No edema.   Skin:     General: Skin is warm and dry.      Capillary Refill: Capillary refill takes less than 2 seconds.      Coloration: Skin is not jaundiced.   Neurological:      General: No " focal deficit present.      Mental Status: He is alert and oriented to person, place, and time. Mental status is at baseline.   Psychiatric:         Mood and Affect: Mood normal.         Behavior: Behavior normal.         Thought Content: Thought content normal.         Judgment: Judgment normal.             Labs:  Recent Labs     12/15/23  0145 12/16/23  0957 23  235   SODIUM 137 136 135   POTASSIUM 4.4 3.8 3.9   CHLORIDE 99 102 101   CO2 25 27 29   BUN 14 15 14   CREATININE 0.60 0.53 0.65   MAGNESIUM 1.5  --   --    PHOSPHORUS 2.9  --   --    CALCIUM 7.1* 7.5* 7.3*     Recent Labs     12/15/23  0145 12/16/23  0957 23   ALTSGPT 50 56* 55*   ASTSGOT 122* 151* 146*   ALKPHOSPHAT 117* 102* 100*   TBILIRUBIN 5.4* 8.9* 9.7*   DBILIRUBIN 2.5*  --   --    LIPASE 32  --   --    GLUCOSE 292* 185* 143*     Recent Labs     12/15/23  0145 12/16/23  0957 23  2350   WBC 11.0*  --  14.7*   NEUTSPOLYS 72.00  --  73.40*   LYMPHOCYTES 14.50*  --  14.60*   MONOCYTES 12.10  --  8.30   EOSINOPHILS 0.10  --  2.40   BASOPHILS 0.80  --  0.90   ASTSGOT 122* 151* 146*   ALTSGPT 50 56* 55*   ALKPHOSPHAT 117* 102* 100*   TBILIRUBIN 5.4* 8.9* 9.7*     Recent Labs     12/15/23  0145 12/15/23  0716 23  0857 23  0957 23  2350 23  1211   RBC 3.03*  --   --   --  2.57*  --    HEMOGLOBIN 10.3*   < > 9.5*  --  8.9* 9.0*   HEMATOCRIT 29.9*   < > 28.0*  --  26.0* 26.3*   PLATELETCT 83*  --   --   --  88*  --    PROTHROMBTM  --   --   --  22.8*  --   --    INR  --   --   --  1.98*  --   --     < > = values in this interval not displayed.     Recent Results (from the past 24 hour(s))   EKG    Collection Time: 23  8:10 PM   Result Value Ref Range    Report       [facility]    Test Date:  2023  Pt Name:    [facility]             Department: 183  MRN:        [id number]                      Room:       [facility]  Gender:     Male                         Technician: AALIYAH  :        1995                    Requested By:RUTH ANN SANTA  Order #:    [id number]                    Reading MD: Huy Greco MD    Measurements  Intervals                                Axis  Rate:       59                           P:          24  NE:         158                          QRS:        33  QRSD:       95                           T:          16  QT:         491  QTc:        487    Interpretive Statements  Sinus bradycardia  Borderline inferior Q waves  Borderline prolonged QT interval  Electronically Signed On 12- 12:27:36 PST by Huy Greco MD     Comp Metabolic Panel    Collection Time: 12/16/23 11:50 PM   Result Value Ref Range    Sodium 135 135 - 145 mmol/L    Potassium 3.9 3.6 - 5.5 mmol/L    Chloride 101 96 - 112 mmol/L    Co2 29 20 - 33 mmol/L    Anion Gap 5.0 (L) 7.0 - 16.0    Glucose 143 (H) 65 - 99 mg/dL    Bun 14 8 - 22 mg/dL    Creatinine 0.65 0.50 - 1.40 mg/dL    Calcium 7.3 (L) 8.5 - 10.5 mg/dL    Correct Calcium 8.4 (L) 8.5 - 10.5 mg/dL    AST(SGOT) 146 (H) 12 - 45 U/L    ALT(SGPT) 55 (H) 2 - 50 U/L    Alkaline Phosphatase 100 (H) 30 - 99 U/L    Total Bilirubin 9.7 (H) 0.1 - 1.5 mg/dL    Albumin 2.6 (L) 3.2 - 4.9 g/dL    Total Protein 5.9 (L) 6.0 - 8.2 g/dL    Globulin 3.3 1.9 - 3.5 g/dL    A-G Ratio 0.8 g/dL   CBC WITH DIFFERENTIAL    Collection Time: 12/16/23 11:50 PM   Result Value Ref Range    WBC 14.7 (H) 4.8 - 10.8 K/uL    RBC 2.57 (L) 4.70 - 6.10 M/uL    Hemoglobin 8.9 (L) 14.0 - 18.0 g/dL    Hematocrit 26.0 (L) 42.0 - 52.0 %    .2 (H) 81.4 - 97.8 fL    MCH 34.6 (H) 27.0 - 33.0 pg    MCHC 34.2 32.3 - 36.5 g/dL    RDW 50.6 (H) 35.9 - 50.0 fL    Platelet Count 88 (L) 164 - 446 K/uL    MPV 11.8 9.0 - 12.9 fL    Neutrophils-Polys 73.40 (H) 44.00 - 72.00 %    Lymphocytes 14.60 (L) 22.00 - 41.00 %    Monocytes 8.30 0.00 - 13.40 %    Eosinophils 2.40 0.00 - 6.90 %    Basophils 0.90 0.00 - 1.80 %    Immature Granulocytes 0.40 0.00 - 0.90 %    Nucleated RBC 0.10 0.00 - 0.20 /100 WBC     Neutrophils (Absolute) 10.83 (H) 1.82 - 7.42 K/uL    Lymphs (Absolute) 2.15 1.00 - 4.80 K/uL    Monos (Absolute) 1.22 (H) 0.00 - 0.85 K/uL    Eos (Absolute) 0.35 0.00 - 0.51 K/uL    Baso (Absolute) 0.13 (H) 0.00 - 0.12 K/uL    Immature Granulocytes (abs) 0.06 0.00 - 0.11 K/uL    NRBC (Absolute) 0.02 K/uL   IMMATURE PLT FRACTION    Collection Time: 12/16/23 11:50 PM   Result Value Ref Range    Imm. Plt Fraction 10.3 0.6 - 13.1 %   ESTIMATED GFR    Collection Time: 12/16/23 11:50 PM   Result Value Ref Range    GFR (CKD-EPI) 131 >60 mL/min/1.73 m 2   HEMOGLOBIN AND HEMATOCRIT    Collection Time: 12/17/23 12:11 PM   Result Value Ref Range    Hemoglobin 9.0 (L) 14.0 - 18.0 g/dL    Hematocrit 26.3 (L) 42.0 - 52.0 %       Radiology Review:  US-RUQ   Final Result         1.  Hepatomegaly   2.  Echogenic liver with irregular contour favoring changes of cirrhosis.   3.  Gallbladder wall thickening without visualized shadowing stones. Nonspecific findings in the setting of hepatocellular disease, but raise concern for cholecystitis. Could be further evaluated with HIDA scan as clinically appropriate.   4.  Trace perihepatic ascites.      CT-ABDOMEN-PELVIS WITH    (Results Pending)         MDM (Data Review):   -Records reviewed and summarized in current documentation  -I personally reviewed and interpreted the laboratory results  -I personally reviewed the radiology images    Assessment/Recommendations:  Esophageal varices s/p banding ligation x 7  Portal hypertensive gastropathy  Cirrhosis  Alcohol abuse  Obesity    MDM:  This is a 28-year-old male with a past medical history as listed above who presented to [facility] 12/14/2023 with hematemesis.  He underwent EGD on 12/15/2023 during which time he was found to have grade 3 esophageal varices in the distal third of esophagus with higher stigmata present.  No active bleeding.  He received banding ligation x 7.  Diffuse portal hypertensive gastropathy  also seen.  Postprocedurally, he reports feeling well without nausea, vomiting, abdominal pain.  Tolerating oral intake at this time.  Right upper quadrant ultrasound showing findings favoring cirrhosis, trace perihepatic ascites.  Patient continuing octreotide and thus far tolerating propranolol without issue.  Hemoglobin stable.  Discussed alcohol cessation with patient for which she verbalizes understanding.  Furthermore discussed recommendations for further GI evaluation outpatient to assess for any underlying liver disease.  thus far, hepatitis B negative.  Hepatitis A antibody pending.  Patient may be immunized for hepatitis a and/or B on outpatient basis as needed.  LFTs, bilirubin elevated-consistent with alcoholic hepatitis.    Recommendations:  Okay to DC octreotide after 72 hours (12/18/2023)  Consider cross-sectional imaging (CT A/P with contrast).   Continue 2 g sodium restriction diet  Absolute alcohol cessation encouraged  Nonselective beta-blocker-tolerating propranolol so far  Repeat EGD in 6 weeks for reassessment/treatment of varices-this can be done with outpatient GI.  Patient will need to see [facility] or [facility] as [facility] does not have outpatient clinic-referral in place.    If patient tolerates NSBB and no other bleeding, ok to discharge from GI standpoint.     Discussed with patient, Dr. Naidu, Dr. King.     Core Quality Measures   Reviewed items::  Labs, Medications and Radiology reports reviewed

## 2023-12-17 NOTE — PROGRESS NOTES
Bedside report received from Ok SPEAR. Pt A&Ox4. SR 63 on the monitors. POC discussed with pt. Pt verbalizes understanding. Call light and belongings within reach. Bed locked and in lowest position. Alarm and fall precautions in place.

## 2023-12-17 NOTE — PROGRESS NOTES
Monitor Summary  Rhythm: Sinus Rhythm, sinus Bro  Rate: 58-95  Ectopy: PVC  .20 / .08 / .45

## 2023-12-17 NOTE — PROGRESS NOTES
Hospital Medicine Daily Progress Note    Date of Service  12/17/2023    Chief Complaint  GI bleed    Hospital Course  Mk Virk is a 28 y.o. male with past medical history of chronic alcohol dependence with history of alcohol withdrawal, who presented 12/14/2023 with 3-4 episodes hematemesis in the last 1 day.  He also had an episode of melanotic stools.  He initially presented at an outside facility where hemoglobin was 12.2,  and ALT 87 with bilirubin of 6 and lipase of 123.  Patient was given pantoprazole and octreotide and was transferred for higher level of care.  Repeat hemoglobin on arrival was 10.3.  WBC 11.  Right upper quadrant ultrasound showed nonspecific gallbladder wall thickening without visualized shadowing stones, with hepatomegaly, echogenic liver with irregular contour favoring changes of cirrhosis, and trace perihepatic ascites. Patient started on IV Protonix, octreotide, prophylactic ceftriaxone, and propranolol.  He is started on CIWA protocol.  Hemoglobin trended down.  GI was consulted who recommended EGD. Patient underwent EGD on 12/16/2023, which showed grade 3 esophageal varices in the distal one third of the esophagus with high risk stigmata present but no active bleeding, with no gastric varices but with noted mild to moderate diffuse portal hypertensive gastropathy, and had variceal band ligation x 7 with incomplete decompression but adequate.  GI recommended 72 hours of IV octreotide.     Interval Problem Update  12/17/2023 - I reviewed the patient's chart today. Uneventful night. VSS. Afebrile. Saturating well on 1.5L O2 NC.  CIWA score remains low at 3.  Hemoglobin 8.9.  WBC 25708.  Electrolytes and renal function remain normal.  LFTs are stable.  INR 1.98.  Hepatitis B serologies were negative.    > I have personally seen and examined the patient today.  He is doing well today.  He has no further hematemesis.  No bowel movements today.  No nausea, vomiting, abdominal  discomfort.  No chest pain or shortness of breath.      I personally reviewed all lab results mentioned above. Prior medical records from this institution and outside facilities were independently reviewed as noted. I also personally reviewed all ER physician and consultant recommendations and plans as documented above. History was independently obtained by myself. I have discussed this patient's plan of care and discharge plan at IDT rounds today with Case Management, Nursing, Nursing leadership, and other members of the IDT team.    Consultants/Specialty  GI    Code Status  Full Code    Disposition  The patient is not medically cleared for discharge to home or a post-acute facility.      Anticipate discharge to home once medically cleared.  I have placed the appropriate orders for post-discharge needs.    Review of Systems  ROS     Pertinent positives/negatives as mentioned above.     A complete review of systems was personally done by me. All other systems were negative.       Physical Exam  Temp:  [36.3 °C (97.3 °F)-36.9 °C (98.4 °F)] 36.9 °C (98.4 °F)  Pulse:  [62-73] 62  Resp:  [16-18] 16  BP: (113-132)/(50-74) 119/55  SpO2:  [91 %-98 %] 91 %    Physical Exam  Vitals reviewed.   Constitutional:       General: He is not in acute distress.     Appearance: Normal appearance. He is obese. He is not toxic-appearing or diaphoretic.      Comments: Body mass index is 36.9 kg/m².   HENT:      Head: Normocephalic and atraumatic.      Right Ear: External ear normal.      Left Ear: External ear normal.      Mouth/Throat:      Mouth: Mucous membranes are moist.      Pharynx: No oropharyngeal exudate.   Eyes:      General: No scleral icterus.     Extraocular Movements: Extraocular movements intact.      Conjunctiva/sclera: Conjunctivae normal.      Pupils: Pupils are equal, round, and reactive to light.   Cardiovascular:      Rate and Rhythm: Normal rate and regular rhythm.      Heart sounds: Normal heart sounds. No murmur  heard.     No gallop.   Pulmonary:      Effort: Pulmonary effort is normal. No respiratory distress.      Breath sounds: Normal breath sounds. No stridor. No wheezing, rhonchi or rales.   Chest:      Chest wall: No tenderness.   Abdominal:      General: Bowel sounds are normal. There is no distension.      Palpations: Abdomen is soft. There is no mass.      Tenderness: There is no abdominal tenderness. There is no guarding or rebound.   Musculoskeletal:         General: No swelling. Normal range of motion.      Cervical back: Normal range of motion and neck supple.      Right lower leg: No edema.      Left lower leg: No edema.   Lymphadenopathy:      Cervical: No cervical adenopathy.   Skin:     General: Skin is warm and dry.      Coloration: Skin is not jaundiced.      Findings: No rash.   Neurological:      General: No focal deficit present.      Mental Status: He is alert and oriented to person, place, and time.      Cranial Nerves: No cranial nerve deficit.   Psychiatric:         Mood and Affect: Mood normal.         Behavior: Behavior normal.         Thought Content: Thought content normal.         Judgment: Judgment normal.       I have performed the physical examination today 12/17/2023.  In review of yesterday's note, there are no new changes except as documented above.      Fluids    Intake/Output Summary (Last 24 hours) at 12/17/2023 1158  Last data filed at 12/17/2023 1028  Gross per 24 hour   Intake 1140 ml   Output 300 ml   Net 840 ml       Laboratory  Recent Labs     12/15/23  0145 12/15/23  0716 12/16/23  0107 12/16/23  0857 12/16/23  2350   WBC 11.0*  --   --   --  14.7*   RBC 3.03*  --   --   --  2.57*   HEMOGLOBIN 10.3*   < > 9.2* 9.5* 8.9*   HEMATOCRIT 29.9*   < > 27.4* 28.0* 26.0*   MCV 98.7*  --   --   --  101.2*   MCH 34.0*  --   --   --  34.6*   MCHC 34.4  --   --   --  34.2   RDW 52.5*  --   --   --  50.6*   PLATELETCT 83*  --   --   --  88*   MPV 11.3  --   --   --  11.8    < > = values in  this interval not displayed.     Recent Labs     12/15/23  0145 12/16/23  0957 12/16/23  2350   SODIUM 137 136 135   POTASSIUM 4.4 3.8 3.9   CHLORIDE 99 102 101   CO2 25 27 29   GLUCOSE 292* 185* 143*   BUN 14 15 14   CREATININE 0.60 0.53 0.65   CALCIUM 7.1* 7.5* 7.3*     Recent Labs     12/16/23  0957   INR 1.98*         Recent Labs     12/15/23  0145   TRIGLYCERIDE 101   HDL 27*   LDL 94       Imaging  US-RUQ   Final Result         1.  Hepatomegaly   2.  Echogenic liver with irregular contour favoring changes of cirrhosis.   3.  Gallbladder wall thickening without visualized shadowing stones. Nonspecific findings in the setting of hepatocellular disease, but raise concern for cholecystitis. Could be further evaluated with HIDA scan as clinically appropriate.   4.  Trace perihepatic ascites.      CT-ABDOMEN-PELVIS WITH    (Results Pending)        Assessment/Plan  * GI bleed- (present on admission)  Assessment & Plan  Considering for variceal bleed given cirrhosis and alcohol dependence.  Hemoglobin has trended down although remains above transfusion threshold.  Maintaining good hemodynamics.  S/p EGD 12/16/2023, which showed grade 3 esophageal varices in the distal one third of the esophagus with high risk stigmata present but no active bleeding, with no gastric varices but with noted mild to moderate diffuse portal hypertensive gastropathy, and had variceal band ligation x 7 with incomplete decompression but adequate.   Continue IV octreotide drip for 72 hours, should complete by tomorrow morning.  Continue IV Protonix twice daily.    Continue prophylactic ceftriaxone.  Continue propranolol, so far tolerating.  Continue to monitor hemoglobin/hematocrit every 12 hours and transfuse as needed.  Close hemodynamic monitoring.  He is counseled extensively against further alcohol use.    Acute blood loss anemia- (present on admission)  Assessment & Plan  Due to acute GI bleed.  Hemoglobin has trended down, but remains  above transfusion threshold.  Monitor hemoglobin/hematocrit every 12 hours.  Restrictive transfusion strategy.    Alcohol dependence (HCC)- (present on admission)  Assessment & Plan  Drinks a fifth daily. He is been abusing alcohol for 3 years now.  He has had episodes of alcohol withdrawal where he gets delirium tremens.  Has never had seizure in the past.  Currently not showing signs of alcohol withdrawal, but at very high risk to withdraw.  Continue as needed Ativan for CIWA protocol.  If starts to withdraw, will start him on scheduled Librium.  Continue thiamine, folate and multivitamins.  Clinically monitor closely.    Cirrhosis (HCC)- (present on admission)  Assessment & Plan  Viral hepatitis panel is nonreactive.  INR 1.98.  LFTs are stable.  Continue to trend.  Will get CT scan of the abdomen/pelvis with contrast.  Will need outpatient follow-up with GI.    Class 2 obesity due to excess calories without serious comorbidity with body mass index (BMI) of 36.0 to 36.9 in adult- (present on admission)  Assessment & Plan  Body mass index is 36.9 kg/m²..   on weight loss.  Outpatient referral for outpatient weight management.         VTE prophylaxis: SCD      My total time spent caring for the patient on the day of the encounter was 51 minutes. This does not include time spent on separately billable procedures/tests.

## 2023-12-18 NOTE — PROGRESS NOTES
Monitor Summary  Rhythm: Sinus Rhythm, sinus Bro with BBB  Rate: 54-70  Ectopy: 0  .14 / .12 / .48

## 2023-12-18 NOTE — DISCHARGE INSTRUCTIONS
Diet    Soft Food Diet    A Soft Food Diet includes foods that are safe and easy to swallow. Generally, the food should be soft enough to be mashed with a fork. Take small bites of food, or cut food into pieces about ½ inch or smaller. Avoid foods that are dry, hard to chew, crunchy, sticky, stringy, or crispy.    Activity    Resume Your Normal Activity    You may resume your normal activity as tolerated.  Rest as needed.

## 2023-12-18 NOTE — PROGRESS NOTES
Patient discharged into care of family. Patient left with family via private vehicle. Patient showed no signs or symptoms of distress at time of DC.

## 2023-12-18 NOTE — PROGRESS NOTES
Assumed care of patient after receiving report from Daryl night shift RN. Patient is currently Alert and Oriented x4 on Room Air. Bed locked and in lowest position. Call light within reach.

## 2023-12-18 NOTE — DIETARY
Nutrition Update:    Day 4 of admit.  Mk Virk is a 28 y.o. male with admitting DX of GIB (gastrointestinal bleeding) [K92.2].  Patient being followed to optimize nutrition.    Current Diet: clear liquid    Problem: Nutritional:  Goal: Achieve adequate nutritional intake  Description: Patient will consume >50% of meals  Outcome: not met    Of note, this is day 4 of NPO/clears - inadequate nutrition. Please advance diet as medically feasible.    RD to monitor per department policy.

## 2023-12-18 NOTE — PROGRESS NOTES
Bedside report received from Ok SPEAR. Pt A&Ox4. SR 73 on the monitors. POC discussed with pt. Pt verbalizes understanding. Call light and belongings within reach. Bed locked and in lowest position. Alarm and fall precautions in place.

## 2023-12-18 NOTE — CARE PLAN
The patient is Stable - Low risk of patient condition declining or worsening    Shift Goals  Clinical Goals: CIWA, H/H  Patient Goals: Go home  Family Goals: not present    Progress made toward(s) clinical / shift goals:    Problem: Optimal Care for Alcohol Withdrawal  Goal: Optimal Care for the alcohol withdrawal patient  Outcome: Progressing  Note: CIWA protocol in place.      Problem: Seizure Precautions  Goal: Implementation of seizure precautions  Outcome: Progressing  Note: Side rails padded, suctions bedside. Patient on continuous pulse ox and given PRN ativan per CIWA score.       Problem: Pain - Standard  Goal: Alleviation of pain or a reduction in pain to the patient’s comfort goal  Outcome: Progressing  Note: Assess and monitor for pain. Provide pharmacological and non-pharmacological interventions as appropriate. Re-evaluate and continue to monitor.          Patient is not progressing towards the following goals:

## 2023-12-18 NOTE — DISCHARGE SUMMARY
Discharge Summary    CHIEF COMPLAINT ON ADMISSION  No chief complaint on file.      Reason for Admission  GI bleed     Admission Date  12/14/2023    CODE STATUS  Full Code    HPI & HOSPITAL COURSE  Mk Virk is a 28 y.o. male with past medical history of chronic alcohol dependence with history of alcohol withdrawal, who presented 12/14/2023 with 3-4 episodes hematemesis in the last 1 day.  He also had an episode of melanotic stools.  He initially presented at an outside facility where hemoglobin was 12.2,  and ALT 87 with bilirubin of 6 and lipase of 123.  Patient was given pantoprazole and octreotide and was transferred for higher level of care.  Repeat hemoglobin on arrival was 10.3.  WBC 11.  Right upper quadrant ultrasound showed nonspecific gallbladder wall thickening without visualized shadowing stones, with hepatomegaly, echogenic liver with irregular contour favoring changes of cirrhosis, and trace perihepatic ascites. Patient started on IV Protonix, octreotide, prophylactic ceftriaxone, and propranolol.  He is started on CIWA protocol.  Hemoglobin trended down.  GI was consulted who recommended EGD. Patient underwent EGD on 12/16/2023, which showed grade 3 esophageal varices in the distal one third of the esophagus with high risk stigmata present but no active bleeding, with no gastric varices but with noted mild to moderate diffuse portal hypertensive gastropathy, and had variceal band ligation x 7 with incomplete decompression but adequate.  GI recommended 72 hours of IV octreotide which he completed.  While in the hospital, he had workup for cirrhosis.  Hepatitis B serologies were negative.  INR was 1.98. CT of the abdomen/pelvis showed cirrhosis with diffuse fatty liver infiltration. He did not show signs of alcohol withdrawal.   Referral for outpatient GI was placed.    He clinically improved.  He had no further GI bleeding.  Hemoglobin remained stable.  LFTs remained stable.  He remained  hemodynamically stable and afebrile. I have personally seen and examined the patient on the day of discharge. With his clinical improvement, he was deemed ready to discharge from the hospital as he did not have any further hospitalization needs. Patient felt comfortable going home. The discharge plan was discussed with the patient, with which he was agreeable to.     Therefore, he is discharged in good and stable condition to home with close outpatient follow-up.    The patient met 2-midnight criteria for an inpatient stay at the time of discharge.    Discharge Date  12/18/2023      FOLLOW UP ITEMS POST DISCHARGE  -He is counseled extensively against further alcohol use.  He will establish care with a PCP.  He is also referred to outpatient GI for ongoing management of cirrhosis.  -Complete prophylactic antibiotics for 4 more days (oral ciprofloxacin).  -Continue propranolol.  -Continue Prilosec.  - counseled to seek immediate medical attention, or return to the ED for recurrent or worsening symptoms.        DISCHARGE DIAGNOSES  Principal Problem:    GI bleed (POA: Yes)  Active Problems:    Cirrhosis (HCC) (POA: Yes)    Alcohol dependence (HCC) (POA: Yes)    Acute blood loss anemia (POA: Yes)    Leukocytosis (POA: Yes)    Class 2 obesity due to excess calories without serious comorbidity with body mass index (BMI) of 36.0 to 36.9 in adult (POA: Yes)  Resolved Problems:    * No resolved hospital problems. *      FOLLOW UP  No future appointments.  No follow-up provider specified.    MEDICATIONS ON DISCHARGE     Medication List        START taking these medications        Instructions   ciprofloxacin 500 MG Tabs  Commonly known as: Cipro   Take 1 Tablet by mouth 2 times a day for 4 days.  Dose: 500 mg     omeprazole 40 MG delayed-release capsule  Commonly known as: PriLOSEC   Take 1 Capsule by mouth every day.  Dose: 40 mg     propranolol 10 MG Tabs  Commonly known as: Inderal   Take 1 Tablet by mouth 2 times a  day.  Dose: 10 mg              Allergies  No Known Allergies    DIET  Orders Placed This Encounter   Procedures    Diet Order Diet: Clear Liquid     Standing Status:   Standing     Number of Occurrences:   1     Order Specific Question:   Diet:     Answer:   Clear Liquid [10]       ACTIVITY  As tolerated.  Weight bearing as tolerated    CONSULTATIONS  GI    PROCEDURES  As above    LABORATORY  Lab Results   Component Value Date    SODIUM 131 (L) 12/18/2023    POTASSIUM 3.7 12/18/2023    CHLORIDE 97 12/18/2023    CO2 26 12/18/2023    GLUCOSE 163 (H) 12/18/2023    BUN 13 12/18/2023    CREATININE 0.66 12/18/2023        Lab Results   Component Value Date    WBC 14.3 (H) 12/18/2023    HEMOGLOBIN 9.2 (L) 12/18/2023    HEMATOCRIT 26.6 (L) 12/18/2023    PLATELETCT 101 (L) 12/18/2023        Total time of the discharge process = 40 minutes.

## 2023-12-28 PROBLEM — N17.0 ACUTE RENAL FAILURE WITH TUBULAR NECROSIS (HCC): Status: ACTIVE | Noted: 2023-01-01

## 2023-12-28 PROBLEM — K72.90 LIVER FAILURE (HCC): Status: ACTIVE | Noted: 2023-01-01

## 2023-12-28 PROBLEM — E87.20 LACTIC ACIDOSIS: Status: ACTIVE | Noted: 2023-01-01

## 2023-12-28 PROBLEM — J98.11 ATELECTASIS: Status: ACTIVE | Noted: 2023-01-01

## 2023-12-28 PROBLEM — R57.8 HEMORRHAGIC SHOCK (HCC): Status: ACTIVE | Noted: 2023-01-01

## 2023-12-28 PROBLEM — J96.01 ACUTE HYPOXEMIC RESPIRATORY FAILURE (HCC): Status: ACTIVE | Noted: 2023-01-01

## 2023-12-28 PROBLEM — G93.40 ENCEPHALOPATHY: Status: ACTIVE | Noted: 2023-01-01

## 2023-12-28 PROBLEM — K92.2 UPPER GI BLEED: Status: ACTIVE | Noted: 2023-01-01

## 2023-12-28 PROBLEM — I85.11 ESOPHAGEAL VARICES WITH BLEEDING IN DISEASES CLASSIFIED ELSEWHERE (HCC): Status: ACTIVE | Noted: 2023-01-01

## 2023-12-28 NOTE — PROCEDURES
[facility]    Inpatient Standard Video Electroencephalogram Report      Patient Name: Mk Virk  MRN: [id number]  #: [id number]  Date of Service: 12/28/23  Total Recording Time: 0 hours and 24 minutes.  Referring Provider: Hector Patiño M.D.    INDICATION:  Mk Virk 28 y.o. male. This standard, inpatient, EEG was requested to evaluate for altered mental status in context of cardiac arrest.     CURRENT ANTI-SEIZURE AND OTHER PERTINENT MEDICATIONS:     Current Facility-Administered Medications:     NORepinephrine    octreotide (SandoSTATIN) 1,250 mcg in  mL Infusion    pantoprazole (Protonix) 80 mg in  mL infusion    Respiratory Therapy Consult    senna-docusate **AND** polyethylene glycol/lytes **AND** magnesium hydroxide **AND** bisacodyl    MD Alert...Adult ICU Electrolyte Replacement per Pharmacy    lidocaine    cefTRIAXone (ROCEPHIN) IV    acetaminophen **OR** acetaminophen (TYLENOL) suppository    busPIRone    sodium bicarbonate 150 meq in D5W 1000 mL    hydrocortisone sodium succinate PF    thiamine    insulin regular **AND** POC blood glucose manual result **AND** NOTIFY MD and PharmD **AND** Administer 20 grams of glucose (approximately 8 ounces of fruit juice) every 15 minutes PRN FSBG less than 70 mg/dL **AND** dextrose bolus    lactulose    fentaNYL **AND** fentaNYL **AND** fentaNYL **AND** dexmedetomidine (Precedex) infusion    vasopressin (Vasostrict) 20 Units in  mL Infusion    TECHNIQUE: Standard inpatient video EEG was set up by a Neurodiagnostic technologist who performed education to the patient and staff. A minimum of 23 electrodes and 23 channel recording was setup and performed by Neurodiagnostic technologist, in accordance with the international 10-20 system. Impedence, electrode integrity, and technical impressions were documented. The study was reviewed in bipolar and referential montages. The recording examined the patient in the drowsy/sleep and/or  comatose state(s).     DESCRIPTION OF THE RECORD:  EEG background: The background was continuous/intermittently discontinuous, symmetrical, and was absent of a posterior dominant rhythm, with a mixture of low amplitude delta and FIRDA/IRDA . Reactivity was not seen. Spontaneous variability was minimal/absent. State changes were not seen.    N2 sleep architecture was absent.    ACTIVATION PROCEDURES:   NA    ICTAL AND INTERICTAL FINDINGS:   There were frequent runs of FIRDA/IRDA, with some bi-frontal sharps embedded in it.     No persistent regional slowing was seen during this routine study.      No electroclinical or electrographic seizures were reported or recorded during the study.     EKG: Sampling of the EKG recording showed tachycardia.    EVENTS:  No clinical events recorded or reported    INTERPRETATION:  This was an abnormal video EEG recording in the drowsy/sleep and/or comatose state(s):  Diffuse slowing of the background with intermittent discontinuity and absent definite reactivity, suggestive of diffuse and/or multifocal cerebral dysfunction. This finding might be seen in a myriad of encephalopathies and in itself is a non-specific finding.  The presence of FIRDA/IRDA is in itself a non-specific finding, but it may be seen in context of toxic-metabolic encephalopathy, and FIRDA also may be seen in context of increased intracranial pressure, among other considerations. Clinical and radiological correlation is recommended.  The presence of bi-frontal sharps embedded in the above noted FIRDA/IRDA may be suggestive of increased propensity toward focal seizures arising from that region.  No evolving electrographic seizures were captured.    García Mason MD  Neurology Attending, Epilepsy Program  [facility]

## 2023-12-28 NOTE — DISCHARGE PLANNING
Case Management Discharge Planning    Admission Date: 12/28/2023  GMLOS: 4.9  ALOS: 0    6-Clicks ADL Score:    6-Clicks Mobility Score:        Anticipated Discharge Dispo: Discharge Disposition: Discharged to home/self care (01)  Discharge Address: [address]   [facility] [address]  Per chart review pt resides in [place], [place].    Family support:   Name                               Relation        Phone   Gary, Morning Jagruti      Significant other     [phone]  Alfred Gonzalez                   [phone]    Current Insurance on file: [facility] [employer]    DME Needed: No    Action(s) Taken: Updated Provider/Nurse on Discharge Plan and DC Assessment Complete (See below)    Cart review complete; Pt was discussed in IDT rounds today; Pt transferred from [place] with Upper GI Bleed and Other (Post cardiac arrest); Pt coded at outside hospital and twice en-route; Pt was intubated upon arrival; Pt on Vent day 1; Pt remains critically ill at this time; LSW will continue to follow to assist with any CM needs.     LSW completed assessment and verified information on face sheet. Patient was recently admitted to [facility] on 12/14/23 and was Dc'd home without any additional needs. Pt lives with his significant other and children in a single-story house. Prior to this hospitalization pt was independent at home with ADLs and most IADLs. Pt does not use any DME at baseline. Pt does not have an advance directive. Pt has a history of ETOH abuse and drinks 1/5 of alcohol daily with attempts to cut back with the help of THC/CBD gummies.     Escalations Completed: None    Medically Clear: No    Next Steps:  f/u with pt and medical team to discuss dc needs and barriers.     Barriers to Discharge: Medical clearance    Is the patient up for discharge tomorrow: No      Care Transition Team Assessment    Information Source  Orientation Level: Unable to assess  Information Given By:  Other (Comments) (Chart review)    Readmission Evaluation  Is this a readmission?: Yes - unplanned readmission    Elopement Risk  Legal Hold: No    Interdisciplinary Discharge Planning  Lives with - Patient's Self Care Capacity: Significant Other, Child Less than 18 Years of Age  Housing / Facility: [address]    Discharge Preparedness  What is your plan after discharge?: Uncertain - pending medical team collaboration  What are your discharge supports?: Partner  Prior Functional Level: Ambulatory, Independent with Activities of Daily Living, Independent with Medication Management  Difficulity with ADLs: None  Difficulity with IADLs: None    Functional Assesment  Prior Functional Level: Ambulatory, Independent with Activities of Daily Living, Independent with Medication Management    Finances  Financial Barriers to Discharge: No  Prescription Coverage: Yes    Advance Directive  Advance Directive?: None    Domestic Abuse  Have you ever been the victim of abuse or violence?: No    Psychological Assessment  History of Substance Abuse: Alcohol    Discharge Risks or Barriers  Patient risk factors: Complex medical needs, No PCP, Substance abuse    Anticipated Discharge Information  Discharge Disposition: Discharged to home/self care (01)  Discharge Address: [address] [facility] [address]

## 2023-12-28 NOTE — PROGRESS NOTES
~0820 - 50 meq of sodium bicarb push given    0858 - 50 meq of sodium bicarb push given    0844 Red box ordered per Dr Kelly    Red box started at 0848 and finished at 0912.  X4 PRBC, x4 FFP and 1 platelet all given per Dr Kelly through [facility].     0905 - 5 of versed given for bedside EGD.    0907 - Time out by MD for bedside EGD, all agreed.       0947 - [place] tube placed by DORIS WOOD.

## 2023-12-28 NOTE — PROGRESS NOTES
Pt arrived to [facility] 0200. No belongings. Family escorted to lobby. EEG tech messaged on voalte for Stat EEG order. Normothermia orders in place, core temp 34.6 degrees. Head CT completed prior to transport.     4 Eyes Skin Assessment Completed by Kristin LINDER RN and Dolores GRAY RN.    Head WDL  Ears WDL  Nose WDL  Mouth Bleeding swollen lips and mouth, missing teeth  Neck WDL  Breast/Chest WDL discoloration and stretch marks   Shoulder Blades WDL  Spine WDL  (R) Arm/Elbow/Hand WDL  (L) Arm/Elbow/Hand WDL  Abdomen WDL  discoloration and stretch marks moisture   Groin Excoriation moisture related   Scrotum/Coccyx/Buttocks Redness and Blanching  (R) Leg Redness boil   (L) Leg WDL  (R) Heel/Foot/Toe WDL  (L) Heel/Foot/Toe WDL          Devices In Places ECG, Blood Pressure Cuff, Pulse Ox, Cottrell, SCD's, EEG, and ET Tube      Interventions In Place Sacral Mepilex, Q2 Turns, and Pressure Redistribution Mattress    Possible Skin Injury No    Pictures Uploaded Into Epic N/A  Wound Consult Placed N/A  RN Wound Prevention Protocol Ordered No

## 2023-12-28 NOTE — CARE PLAN
Problem: Safety - Medical Restraint  Goal: Remains free of injury from restraints (Restraint for Interference with Medical Device)  Outcome: Progressing     Problem: Pain - Standard  Goal: Alleviation of pain or a reduction in pain to the patient’s comfort goal  Outcome: Progressing   The patient is Unstable - High likelihood or risk of patient condition declining or worsening         Progress made toward(s) clinical / shift goals:  blakemoore tube in place    Patient is not progressing towards the following goals:

## 2023-12-28 NOTE — PROGRESS NOTES
0330- Dr. Chavez notified that lactic is 13.3    0550- Dr. Chavez notified that patient had 1.1L of bloody stool out. Stat CBC ordered.    0615- Resident notified that lactulose enema was unsuccessful due to continuous bloody BMs

## 2023-12-28 NOTE — CONSULTS
Critical Care Medicine Faculty Consult Note    Consulted by: Dr Recio    Reason for consult: hemorrhagic shock from upper gi bleed, cardiac arrest, respiratory failure, massive transfusion, hyperkalemia, esau, alcoholic cirrhosis    HPI: 28y M hx of alcohol abuse with 1pint of hard alcohol a day. Was recently admitted for upper GI bleed for variceal hemorrhage 12/18 requiring 7 bands. After leaving hospital patient started turning yellow per wife he has been trying to cut down on drinking but using gummies. He then last night started to vomiting bright red blood filling up an entire barf bag with bright red blood. She couldn't get him to come to hospital but tonight she was able to. He arrived and was being transferred here from outside hospital when he suffered a cardiac arrest requiring 5-10 minutes of CPR and was diverted to [facility] where he was intubated and had central line placed. He was obtained ROCS and transferred here for higher level of care. He is currently intubated so hx is taken from [facility] at bedside. He was able to be weaned off pressor on arrival here. He found to have esau with hyperkalemia to 7, Hg was 10.8 and plt of 92, INR of 3.2, bili of 8.2, cr of 2.45, bicarb of 11. He was given a total of 12 units PRBC,4 FFP, 1 PLT prior to arrival with TXA. GI has been consulted. He will be DNR I okay.    Exam: Ill appearing male with jaundice, blood in nose, lungs coarse mechanical breath sounds, heart rrr, abdomen distended, ext with no mottling, neuro no withdrawal, concerns of mouthing movement on ET tube.     A/P:  Upper GI bleed: likely variceal hemorrhage s/p massive transfusion and cardiac arrest. GI consult, check and correct coagulopathy, C3, octreotide, ppi, serial CBC, PT/INR, teg, S/p 12 units PRBC, 4 FFP, 1 PLT, TXA.     Alcoholic cirrhosis: Meld Na = 37, daily meld labs, Madderys discrimination factor 97 start trial of steroids, check chapin score in one week. GI consult, check RUQ u/s  rule out thrombosis.     Cardiac arrest: related to massive GI bleed and hypotension, normothermia protocol, CT head, cEEG, serial neuro exam, MRI at 3-5 days post arrest, echo, EKG.     DB: rob, check CPK, map > 65 likely arrest related and ischemic ATN, u/a, monitor need for nephrology consult.    AGMA: check lactate, betahydroxy level rule out starvation ketosis vs lactate post arrest. High dose thiamine.     Hyperkalemia: s/p hyper K treatment no EKG changes, Bicarb gtt, serial BMP, lasix 100mg IV now out of shock. Monitor need for iHD.     Transaminitis: due to alcoholic liver disease, avoid hepatoxins, serial monitor, check CPK, RUQ u/s.     Hyperglycemia: insulin sliding scale, hgA1C 8.2 12/15    Coagulopathy: serial monitor, check Teg w/ mapping, fibrinogen.     Thrombocytopenia: related to liver disease and bone marrow suppression. Serial monitor and transfuse if < 50k with bleeding.     Hepatic encephalopathy: start lactulose and rifaxamin, serial monitor.     Goals of care: discussed with family at bedside patient will be DNR I okay. With ongoing goals of care with things arise and pending response to treatment.     Patient remains in critical condition from post cardiac arrest and upper gi bleed and acute hyperkalemia and ventilator support . Critical care time provided was 130 minutes. This excludes all separate billable procedures.     Please see [facility] notes for additional documentation    Hector Patiño MD  Critical Care Medicine

## 2023-12-28 NOTE — DISCHARGE PLANNING
Referral: Acute Medical Patient Response    Intervention: SW responded to an acute medical patient.  Pt was [facility] post cardiac arrest.  Pt was intubated upon arrival.  Pts name is Mk Virk (: 1995).  SW obtained the following pt information: Per EMS Pt is a transfer originally from [place] but they had to stop on the way due to the Pt coding enroute.  SW reviewed Pt chart and noted that Pt's Emergency Contact is his S.O. Misael Vega.    Shortly after the Pt arrived this SW was made aware that family was waiting in the lobby.  SW met with family and found that the Pt's S.O. Morning Jagruti was present.  SW was informed by Misael Chand that the Pt has a Brother Alfred who is on the way to [facility] as well.  Per family Pt is not , his Mother passed away and his father is disabled.     Brother: Alfred [phone]  Significant Other: Misael Vega [phone]    Plan: SW will continue to monitor and assist if needs arise.

## 2023-12-28 NOTE — ED TRIAGE NOTES
"Chief Complaint   Patient presents with    Upper GI Bleed    Other     Post cardiac arrest     Pt transfer from [facility] where pt was seen for ETOH/ cullen blood/ tarry stools/ esophageal varicies. Pt coded at hospital and twice en-route per transfer RN. Pt received 1 g rocephin, 80 protonix, 50 octreotide, Vec, etomidate, 6 mg total EPI, octreotide, Ca, 10 units PRBC, 4 units FFP. Central line to right chest.     BP (!) 86/46   Pulse 75   Temp (!) 31.7 °C (89.1 °F) (Bladder)   Resp (!) 22   Ht 1.803 m (5' 10.98\")   Wt 124 kg (273 lb 5.9 oz)   SpO2 95%   BMI 38.14 kg/m²      "

## 2023-12-28 NOTE — CARE PLAN
Problem: Safety - Medical Restraint  Goal: Free from restraint(s) (Restraint for Interference with Medical Device)  Outcome: Not Progressing  Flowsheets (Taken 12/28/2023 0747)  Addressed this shift: Free from restraint(s) (restraint for interference with medical device):   ONCE/SHIFT or MINIMUM Every 12 hours: Assess and document the continuing need for restraints   Every 24 hours: Continued use of restraint requires Licensed Independent Practitioner to perform face to face examination and written order   Identify and implement measures to help patient regain control     Problem: Safety - Medical Restraint  Goal: Free from restraint(s) (Restraint for Interference with Medical Device)  Outcome: Not Progressing  Flowsheets (Taken 12/28/2023 0747)  Addressed this shift: Free from restraint(s) (restraint for interference with medical device):   ONCE/SHIFT or MINIMUM Every 12 hours: Assess and document the continuing need for restraints   Every 24 hours: Continued use of restraint requires Licensed Independent Practitioner to perform face to face examination and written order   Identify and implement measures to help patient regain control     Problem: Pain - Standard  Goal: Alleviation of pain or a reduction in pain to the patient’s comfort goal  Outcome: Progressing

## 2023-12-28 NOTE — PROCEDURES
OPERATIVE REPORT    PATIENT:   Mk Virk   1995       PREOPERATIVE DIAGNOSES/INDICATIONS: GI bleeding     POSTOPERATIVE DIAGNOSIS:   Likely esophageal variceal bleeding.   Not able to confirm bleeding source due to   large amount of blood in stomach, with overflow to esophagus.     Empiric banding was attempt 4 times, only one stays steadily due to friable mucosa and scar from previous treatment.     Only able to see 50% of the cardia, no overt varix.     Not able to see duodenum, blood clot fills the whole antrum and duodenum.     [place] tube place at the end of this EGD.   Wait for IR evaluation for urgent TIPS.     Poor prognosis is discussed by GI team and his significant other at bedside.     PROCEDURE:  ESOPHAGOGASTRODUODENOSCOPY    PHYSICIAN:  Ijeoma Encarnacion MD. MPH.    ANESTHESIA:  Per anesthesiologist or ICU/ED team.     LOCATION: [facility]    CONSENT:  OBTAINED. The risks, benefits and alternatives of the procedure were discussed in details. The risks include and are not limited to bleeding, infection, perforation, missed lesions, and sedations risks (cardiopulmonary compromise and allergic reaction to medications).    DESCRIPTION: The patient presented to the procedure room.  After routine checkup was performed, patient was brought into the endoscopy suite.  Patient was placed on his left lateral decubitus position. A bite block was placed in patient's mouth. Patient was sedated by anesthesia.  Vital signs were monitored throughout the procedure.  Oxygenation support was provided throughout the procedure. Upper endoscope was inserted into patient's mouth and advanced to the second portion of the duodenum under direct visualization.      Once the site was reached and examined, the upper endoscope was withdrawn.  Retroflexion was made within the stomach.  The stomach was decompressed, scope was withdrawn and the procedure was terminated.    The patient tolerated the procedure well.  There were  no immediate complications.    OPERATIVE FINDINGS:  Likely esophageal variceal bleeding.   Not able to confirm bleeding source due to   large amount of blood in stomach, with overflow to esophagus.     Empiric banding was attempt 4 times, only one stays steadily due to friable mucosa and scar from previous treatment.     Only able to see 50% of the cardia, no overt varix.     Not able to see duodenum, blood clot fills the whole antrum and duodenum.     [place] tube place at the end of this EGD.   Wait for IR evaluation for urgent TIPS.     Poor prognosis is discussed by GI team and his significant other at bedside.     RECOMMENDATIONS:  Consider IR intervention, otherwise, high mortality from his current GI bleeding and terminal liver disease.     Inpatient GI team will continue to follow up.       This note has been transcribed with digital voice recognition software and although it has been reviewed may contain grammatical or word errors

## 2023-12-28 NOTE — CONSULTS
Critical Care/Pulmonary Consultation    Date of Service: 12/28/2023    Date of Admission:  12/28/2023 12:09 AM    Consulting Physician: Hector Patiño M.D.    Chief Complaint:  Upper GI Bleed and Other (Post cardiac arrest)      History of Present Illness: Mk Virk is a 28 y.o. male with a past medical history of alcohol abuse who presented on 12/27/28 as a transfer from [place] where patient was seen for ETOH/cullen blood/tarry stools/esophageal varices. Patient coded at outside hospital and twice en-route.   Patient received 1g rocephin, 80 protonix, 50 octreotide, Vec, etomidate, 6 mg total EPI, octreotide, Ca, 10 units PRBC, 4 units FFP. Central line to right chest.    Medical history and history of present illness obtained from patient's daniela Douglas Jagruti (can be contacted at [phone]). She reports patient was recently hospitalized here at [facility] this month for GI bleed and had banding of esophageal varices. Patient is from [place] and was planning on establishing care with GI group in [place] but has not been seen yet. Patient has history of alcohol abuse, drinks 1/5 of alcohol daily with attempts to cut back with the help of THC/CBD gummies. She denies patient uses any other illicit/recreational substances. She reports patient began having hematemesis yesterday, approximate volume ~1L as he was using emesis bags. She reports the volume decreased over the day and patient refused to seek medical attention.   Upon arrival, patient's labs are notable for hgb of 10.8, platelets of 92, K of 7.2, AST of 718, ALT of 174, T. Bili of 8.2, albumin of 1.9, BUN of 33, Creatinine of 2.45. Cottrell catheter was placed and patient is anuric.   Patient transferred here for higher level of care.     Review of Systems   Unable to perform ROS: Intubated       Home Medications       Reviewed by Vivienne Benites (Pharmacy Tech) on 12/28/23 at 0127  Med List Status: Unable to Obtain     Medication Last  "Dose Status   omeprazole (PRILOSEC) 40 MG delayed-release capsule  Active   propranolol (INDERAL) 10 MG Tab  Active                    Social History     Tobacco Use    Smoking status: Never     Passive exposure: Never    Smokeless tobacco: Never   Vaping Use    Vaping Use: Never used   Substance Use Topics    Alcohol use: Yes     Alcohol/week: 7.2 oz     Types: 12 Shots of liquor per week    Drug use: Never        Past Medical History:   Diagnosis Date    Hypertension        Past Surgical History:   Procedure Laterality Date    NV UPPER GI ENDOSCOPY,DIAGNOSIS N/A 12/15/2023    Procedure: GASTROSCOPY;  Surgeon: Benson Lott M.D.;  Location: SURGERY SAME DAY [facility];  Service: Gastroenterology    NV UPPER GI ENDOSCOPY,LIGAT VARIX N/A 12/15/2023    Procedure: GASTROSCOPY, WITH VARICEAL BANDING;  Surgeon: Benson Lott M.D.;  Location: SURGERY SAME DAY [facility];  Service: Gastroenterology       Allergies: Patient has no known allergies.    No family history on file.    Vitals:    12/28/23 0013 12/28/23 0014 12/28/23 0021 12/28/23 0026   Height: 1.803 m (5' 10.98\")      Weight: 124 kg (273 lb 5.9 oz)      Weight % change since last entry.: 0 %      BP: 99/45 (!) 86/46     Pulse: 78 75  80   BMI (Calculated): 38.14      Resp: 17 (!) 22  20   Temp:   (!) 31.7 °C (89.1 °F)    TempSrc:   Bladder     12/28/23 0029 12/28/23 0032 12/28/23 0034 12/28/23 0037   Height:       Weight:       Weight % change since last entry.:       BP: 107/52  119/55    Pulse:  88  89   BMI (Calculated):       Resp:       Temp:       TempSrc:        12/28/23 0039 12/28/23 0044 12/28/23 0054 12/28/23 0100   Height:       Weight:       Weight % change since last entry.:       BP: (!) 140/63 133/62 133/59 124/55   Pulse:  90 87 87   BMI (Calculated):       Resp:    13   Temp:       TempSrc:        12/28/23 0104   Height:    Weight:    Weight % change since last entry.:    BP: 113/55   Pulse: 85   BMI (Calculated):    Resp:    Temp:    TempSrc:  "       Physical Examination  General: Intubated, sedated   Neuro/Psych: did not assess   HEENT: NC, AT, pupils ~6mm bilateral, PERRLA, scleral icterus, dried blood in bilateral nares   CVS: distant heart sounds, no m/r/g appreciated   Respiratory: rhonchi in bilateral upper lobes, decreased breath sounds in bilateral lower lobes   Abdomen/: protuberant, soft  Extremities: warm, pedal pulses in tact, no le edema   Skin: jaundiced for ethnicity     No intake or output data in the 24 hours ending 12/28/23 0153    Recent Labs     12/28/23  0029 12/28/23  0030   WBC  --  34.7*   NEUTSPOLYS  --  63.70   LYMPHOCYTES  --  7.30*   MONOCYTES  --  4.00   EOSINOPHILS  --  1.60   BASOPHILS  --  0.80   ASTSGOT 718*  --    ALTSGPT 174*  --    ALKPHOSPHAT 139*  --    TBILIRUBIN 8.2*  --      Recent Labs     12/28/23  0029   SODIUM 133*   POTASSIUM 7.2*   CHLORIDE 99   CO2 11*   BUN 33*   CREATININE 2.45*   CALCIUM 9.4     Recent Labs     12/28/23  0029   ALTSGPT 174*   ASTSGOT 718*   ALKPHOSPHAT 139*   TBILIRUBIN 8.2*   GLUCOSE 191*         DX-CHEST-PORTABLE (1 VIEW)   Final Result         Endotracheal tube with tip projecting over the mid thoracic trachea.      Right central venous catheter with tip projecting over the expected area of the SVC.      CT-HEAD W/O    (Results Pending)   EC-ECHOCARDIOGRAM COMPLETE W/O CONT    (Results Pending)       Patient Active Problem List   Diagnosis    GI bleed    Cirrhosis (HCC)    Alcohol dependence (HCC)    Class 2 obesity due to excess calories without serious comorbidity with body mass index (BMI) of 36.0 to 36.9 in adult    Acute blood loss anemia    Leukocytosis    Upper GI bleed       Assessment and Plan:  #Upper GI Bleed   #Esophageal varices s/p banding   #Alcohol dependence   #Cirrhosis   #Cardiac arrest   En route from outside hospital, patient received 1g rocephin, 80 protonix, 50 octreotide, Vec, etomidate, 6 mg total EPI, octreotide, Ca, 10 units PRBC, 4 units FFP. Central line  to right chest. Patient is intubated and sedated. GI consulted in the ED, plan for EGD early in the morning.     -Admit to ICU  -continuous cardiac monitoring   -rob catheter   -strict I/Os  -continue current vent settings  -RT protocol   -SBT/SAT   -ceftriaxone 1g daily for 7 doses   -100mg IV lasix challenge (for hyperkalemia)  -sodium bicarb 150meq in d5w infusion at 100cc/hr   -octreotide infusion  -pantoprazole IV 80mg  -NPO   -chest xray  -head ct   -echo   -EEG  -ABG  -BMP q6hr  -f/u mag and phos   -CPK   -Fibrinogen  -TEG  -ionized ca  -ammonia  -lactulose rectal solution q6h   -CBC q6hr  -PT/INR  -UDS  -trend lactic acid       Dr. Patiño discussed code status with patient's fiance Morning Jagruti and concluded that patient would not be a good candidate in his condition with his comorbidities to undergo CPR in the event of recurrent cardiac arrest. Discussion remains open and amenable to change if patient's course improves. At this time, patient will be DNAR/I ok.     Sylvia Chavez M.D., MD  [facility]  Patient is critically ill.

## 2023-12-28 NOTE — PROGRESS NOTES
0952: second red box administration begun (see massive transfusion documentation form scanned into chart), Dr. Encarnacion and Dr. Kelly at bedside after Dr. Encarnacion placed [place] tube following bedside EGD.  1026: 50 mg propofol administered per MAR before Dr. Kelly paralyzed patient in attempt to move tongue and open mouth for visualization.  1027: patient more hypotensive (SBP 50s) and bradycardic (HR 20s), calcium chloride, atropine, and epinephrine administered per MAR per Dr. Kelly for symptomatic bradycardia.  1038: SBP and HR stabilized, rocuronium administered per MAR per Dr. Kelly for visualization of mouth  1054: emergent bronchoscopy performed by Dr. Kelly.

## 2023-12-28 NOTE — PROGRESS NOTES
Single overhead crossbar placed on foot of ICU bed with 3lbs traction for [place] tube    Contact traction for any questions or concerns

## 2023-12-28 NOTE — CONSULTS
Date of Consultation:  12/28/2023    Patient: : Mk Virk  MRN: [id number]    Referring Physician:  Dr. Daniella Kelly     GI:Ijeoma Encarnacion M.D.     Reason for Consultation: Upper GI bleeding, hematemesis, melena.    History of Present Illness:   The patient is a 28 years old gentleman with medical history of hypertension, alcohol related cirrhosis, GI bleeding s/p variceal banding, recent admission about 2 weeks ago for GI bleeding, presented to our hospital again for acute GI bleeding.    Went to outside hospital before arriving here, IV antibiotic, octreotide, PPI and the blood transfusion was started.  GI team saw the patient at the ICU bedside early morning.  The patient is intubated, on ventilator, sedated.  Continue to be hypotensive, acute liver injury, AST larger than ALT, and total bili around 10.  White count 29,000 with hemoglobin around 10.  Platelet count is 1 37,000.  High lactic acid at 13.    Distended abdomen but no acute abdomen.        Past Medical History:   Diagnosis Date    Hypertension          Past Surgical History:   Procedure Laterality Date    [place] UPPER GI ENDOSCOPY,DIAGNOSIS N/A 12/15/2023    Procedure: GASTROSCOPY;  Surgeon: Benson Lott M.D.;  Location: SURGERY SAME DAY [facility];  Service: Gastroenterology    [place] UPPER GI ENDOSCOPY,LIGAT VARIX N/A 12/15/2023    Procedure: GASTROSCOPY, WITH VARICEAL BANDING;  Surgeon: Benson Lott M.D.;  Location: SURGERY SAME DAY [facility];  Service: Gastroenterology       No family history on file.    Social History     Socioeconomic History    Marital status: Single   Occupational History    Occupation: Road Tech   Tobacco Use    Smoking status: Never     Passive exposure: Never    Smokeless tobacco: Never   Vaping Use    Vaping Use: Never used   Substance and Sexual Activity    Alcohol use: Yes     Alcohol/week: 7.2 oz     Types: 12 Shots of liquor per week    Drug use: Never     Limited review of system.    HEENT: grossly  normal.Jaundiced.   Cardiovascular: Please refer to primary team's daily assessment.   Lungs: Please refer to primary team's daily assessment.   Abdomen: Soft, No tenderness, but distended.   Skin: No erythema, No rash.  Lower limbs: normal, no pitting edema.             Physical Exam:  Vitals:    12/28/23 0616 12/28/23 0619 12/28/23 0630 12/28/23 0645   BP: (!) 76/38 (!) 82/41 (!) 84/47 (!) 85/48   Pulse: (!) 110 (!) 103 (!) 111 (!) 113   Resp: (!) 28 (!) 22 (!) 28 (!) 26   Temp:       TempSrc:       SpO2:       Weight:       Height:                 Labs:  Recent Labs     12/28/23  0030 12/28/23  0149 12/28/23  0550   WBC 34.7* 25.7* 29.8*   RBC 3.58* 3.82* 3.28*   HEMOGLOBIN 10.8* 11.4* 9.7*   HEMATOCRIT 33.9* 37.0* 29.2*   MCV 94.7 96.9 89.0   MCH 30.2 29.8 29.6   MCHC 31.9* 30.8* 33.2   RDW 55.0* 57.1* 52.7*   PLATELETCT 92* 87* 137*   MPV 10.9 10.5 10.0     Recent Labs     12/28/23  0029 12/28/23  0154 12/28/23  0550   SODIUM 133*  --  136   POTASSIUM 7.2*  --  4.5   CHLORIDE 99  --  99   CO2 11*  --  15*   GLUCOSE 191*  --  156*   BUN 33*  --  35*   CPKTOTAL  --  424*  --      Recent Labs     12/28/23  0029   INR 3.23*       Recent Labs     12/28/23  0029 12/28/23  0149 12/28/23  0550   ASTSGOT 718*  --  1964*   ALTSGPT 174*  --  419*   TBILIRUBIN 8.2*  --  9.5*   ALKPHOSPHAT 139*  --  112*   GLOBULIN 2.0  --  1.9   INR 3.23*  --   --    AMMONIA  --  648*  --          Imaging:  US-RUQ  Narrative: 12/28/2023 3:04 AM    HISTORY/REASON FOR EXAM:  Cirrhosis; rule out PV thrombosis  Abdominal pain    TECHNIQUE/EXAM DESCRIPTION AND NUMBER OF VIEWS:  Real-time sonography of the liver and biliary tree.    COMPARISON: 12/15/2023    FINDINGS:  LIVER: Cirrhotic liver. No focal liver lesion visualized. The liver measures 19.10 cm. The portal vein is patent with hepatopetal flow. The MPV measures 1.34 cm.    GALLBLADDER: Not visualized    COMMON BILE DUCT: Measures 4.90 mm.    PANCREAS: Visualized portion of the pancreas  appears normal. Overall, pancreas not well-seen secondary to overlying bowel gas.    RIGHT KIDNEY: No renal stone or hydronephrosis. Normal cortical echogenicity. The right kidney measures 12.25 cm.    IVC:  Visualized portion of IVC is normal.    OTHER: Mild ascites. Largest pocket of fluid is within the right lower quadrant and measures 9 cm.  Impression: 1. Cirrhotic liver.  2. Portal vein is patent.  3. Mild ascites.  DX-ABDOMEN FOR TUBE PLACEMENT  Narrative: 12/28/2023 3:08 AM    HISTORY/REASON FOR EXAM:  Nasogastric tube placement    TECHNIQUE/EXAM DESCRIPTION AND NUMBER OF VIEWS:  1 view(s) of the abdomen.    COMPARISON:  12/17/2023 CT    FINDINGS:  Enteric tube has been placed.    The tip projects over the right lower lobe.    The bowel gas pattern is within normal limits.  Central line and endotracheal tube are noted. Low lung volumes. Bibasilar pleural-parenchymal opacification.  Impression: Malpositioned nasogastric tube, projecting into the right lower lobe. This was discussed with the patient's nurse Caryn  at 0345 hours.  CT-HEAD W/O  Narrative: 12/28/2023 1:47 AM    HISTORY/REASON FOR EXAM:  UPPER GI BLEED  Post arrest    TECHNIQUE/EXAM DESCRIPTION AND NUMBER OF VIEWS:  CT of the head without contrast.    The study was performed on a helical multidetector CT scanner. Contiguous 2.5 mm axial sections were obtained from the skull base through the vertex.    Up to date radiation dose reduction adjustments have been utilized to meet ALARA standards for radiation dose reduction.    COMPARISON:  None available    FINDINGS:  There is no evidence of acute intracranial hemorrhage, mass, mass-effect or shift of midline structures.    Gray-white matter differentiation is grossly preserved.    Ventricle size and brain parenchymal volume are appropriate for this patient's stated age.    The basal cisterns are patent.    There are no abnormal extra-axial fluid collections.    No depressed or widely   calvarial fracture is seen.    The visualized paranasal sinuses and temporal bone structures are aerated.    ___________________________________  Impression: 1. No evidence of acute intracranial hemorrhage or mass lesion.  DX-CHEST-PORTABLE (1 VIEW)  Narrative: 12/28/2023 12:22 AM    HISTORY/REASON FOR EXAM:  ETT CVL    TECHNIQUE/EXAM DESCRIPTION AND NUMBER OF VIEWS:  Single portable view of the chest.    COMPARISON: None    FINDINGS:  Endotracheal tube with tip projecting over the mid thoracic trachea.    Right central venous catheter with tip projecting over the expected area of the SVC.    Low lung volume.    Hazy bibasilar opacities. No pleural effusion. No pneumothorax.    Stable cardiopericardial silhouette.  Impression: Endotracheal tube with tip projecting over the mid thoracic trachea.    Right central venous catheter with tip projecting over the expected area of the SVC.            Impressions:  The patient is a 28 years old gentleman with medical history of hypertension, alcohol related cirrhosis, GI bleeding s/p variceal banding, recent admission about 2 weeks ago for GI bleeding, presented to our hospital again for acute GI bleeding.    Plan to have upper GI scope at ICU bedside.  Continue IV PPI, IV octreotide and antibiotic.    Diagnosis: upper gastrointestinal bleeding.   Procedure: Esophagogastroduodenoscopy with bleeding control including banding.         This note was generated using voice recognition software which has a small chance of producing errors of grammar and possibly content. I have made every reasonable attempt to find and correct any obvious errors, but expect that some may not be found prior to finalization of this note.

## 2023-12-28 NOTE — ASSESSMENT & PLAN NOTE
Status post attempted banding on 12/28/2023, s/p [place] tube  Blood product count: PRBC 24, FFP 16, platelet 4, cryo 2  - Protonix gtt   - Octreotide gtt  - GI following  - Monitor Hgb, transfuse <7

## 2023-12-28 NOTE — FLOWSHEET NOTE
23 0000   Events/Summary/Plan   Events/Summary/Plan pt arrived intuabted from OSH placed on our vent   Ventiliation   $ Ventilation - Initial Yes   Vent Clock Initiated Yes   General Vent Information   Vent Mode APVCMV   Vent Alarms   Ventilation Alarms Reviewed / Activated Yes   Upper Pressure Limit Alarm 45   Lower Pressure Limit 18   Low VE Alarm 4   High Respiratory Rate Alarm 40   Low Respiratory Rate Alarm 4   Low VT Alarm 150   Apnea Parameters Checked / Activated Yes   % Leak Alarm off   Vent Settings   FiO2% 100 %   Rate (breaths/min) 22   Vent Temp HME Yes   Vt Target (mL) 450   TI (Seconds) 0.8   PEEP/CPAP 10   Pramp 70   Trigger Type Flow Trigger   Sensitivity Setting 5   Vent Readings   PIP 28    Minute Volume 10   Control VTE (exp VT) 452   f Total (Breaths/Min) 22   I:E Ratio 1:2.4   MAP 16   PEEP/CPAP MONITORED 11   Static Compliance (ml / cm H2O) 35.7   R exp 0.5

## 2023-12-28 NOTE — ED PROVIDER NOTES
CHIEF COMPLAINT  Chief Complaint   Patient presents with    Upper GI Bleed    Other     Post cardiac arrest       LIMITATION TO HISTORY   Select: Intubation    EDDI Virk is a 28 y.o. male who presents to the Emergency Department by ground ambulance from [facility] for evaluation after patient sustained a cardiac arrest during transportation from [address], patient is limited as patient is intubated history obtained through discussion with paramedics, speak with Dr. Huynh from referring facility in addition to review of medical records.  Patient on IV Levophed drip is received 10 units of PRBCs after this time is completing his 10th unit of blood    OUTSIDE HISTORIAN(S):  Select: EMS    EXTERNAL RECORDS REVIEWED  Select: Reviewed notes from ER visit from [facility] Emergency Department today      PAST MEDICAL HISTORY  Past Medical History:   Diagnosis Date    Hypertension      .    SURGICAL HISTORY  Past Surgical History:   Procedure Laterality Date    [place] UPPER GI ENDOSCOPY,DIAGNOSIS N/A 12/15/2023    Procedure: GASTROSCOPY;  Surgeon: Benson Lott M.D.;  Location: SURGERY SAME DAY [facility];  Service: Gastroenterology    [place] UPPER GI ENDOSCOPY,LIGAT VARIX N/A 12/15/2023    Procedure: GASTROSCOPY, WITH VARICEAL BANDING;  Surgeon: Benson Lott M.D.;  Location: SURGERY SAME DAY [facility];  Service: Gastroenterology         FAMILY HISTORY  No family history on file.       SOCIAL HISTORY  Social History     Socioeconomic History    Marital status: Single     Spouse name: Not on file    Number of children: Not on file    Years of education: Not on file    Highest education level: Not on file   Occupational History    Occupation: Road Tech   Tobacco Use    Smoking status: Never     Passive exposure: Never    Smokeless tobacco: Never   Vaping Use    Vaping Use: Never used   Substance and Sexual Activity    Alcohol use: Yes     Alcohol/week: 7.2 oz      "Types: 12 Shots of liquor per week    Drug use: Never    Sexual activity: Not on file   Other Topics Concern    Not on file   Social History Narrative    Not on file     Social Determinants of Health     Financial Resource Strain: Not on file   Food Insecurity: Not on file   Transportation Needs: Not on file   Physical Activity: Not on file   Stress: Not on file   Social Connections: Not on file   Intimate Partner Violence: Not on file   Housing Stability: Not on file         CURRENT MEDICATIONS  No current facility-administered medications on file prior to encounter.     Current Outpatient Medications on File Prior to Encounter   Medication Sig Dispense Refill    omeprazole (PRILOSEC) 40 MG delayed-release capsule Take 1 Capsule by mouth every day. 30 Capsule 0    propranolol (INDERAL) 10 MG Tab Take 1 Tablet by mouth 2 times a day. 120 Tablet 0           ALLERGIES  No Known Allergies    PHYSICAL EXAM  VITAL SIGNS:BP (!) 81/45   Pulse 98   Temp (!) 34.6 °C (94.3 °F)   Resp (!) 42   Ht 1.803 m (5' 10.98\")   Wt 124 kg (273 lb 5.9 oz)   SpO2 90%   BMI 38.14 kg/m²       GENERAL: Patient intubated  HEAD: Normocephalic and atraumatic  NECK: Normal range of motion, without meningismus  EYES: Pupils Equal, Round, Reactive to Light, extraocular movements intact, conjunctiva white  ENT: Mucous membranes moist, oropharynx clear  PULMONARY: Normal effort, clear to auscultation  CARDIOVASCULAR: No murmurs, clicks or rubs, peripheral pulses 2+  ABDOMINAL: Soft, non-tender, no guarding or rigidity present, no pulsatile masses  RECTAL: David hematochezia present BACK: no midline tenderness, no costovertebral tenderness  NEUROLOGICAL: Comatose exam , nonresponsive  EXTREMITIES: No edema, normal to inspection  SKIN: Warm and dry.      DIAGNOSTIC STUDIES / PROCEDURES  EKG  CRITICAL CARE  The very real possibilty of a deterioration of this patient's condition required the highest level of my preparedness for sudden, emergent " intervention.  I provided critical care services, which included medication orders, frequent reevaluations of the patient's condition and response to treatment, ordering and reviewing test results, and discussing the case with various consultants.  The critical care time associated with the care of the patient was 33 minutes. Review chart for interventions. This time is exclusive of any other billable procedures.       LABS  Labs Reviewed   CBC WITH DIFFERENTIAL - Abnormal; Notable for the following components:       Result Value    WBC 34.7 (*)     RBC 3.58 (*)     Hemoglobin 10.8 (*)     Hematocrit 33.9 (*)     MCHC 31.9 (*)     RDW 55.0 (*)     Platelet Count 92 (*)     Lymphocytes 7.30 (*)     Nucleated RBC 4.20 (*)     Neutrophils (Absolute) 22.10 (*)     Monos (Absolute) 1.39 (*)     Eos (Absolute) 0.56 (*)     Baso (Absolute) 0.28 (*)     All other components within normal limits    Narrative:     Indicate which anticoagulants the patient is on:->NONE   COMP METABOLIC PANEL - Abnormal; Notable for the following components:    Sodium 133 (*)     Potassium 7.2 (*)     Co2 11 (*)     Anion Gap 23.0 (*)     Glucose 191 (*)     Bun 33 (*)     Creatinine 2.45 (*)     Correct Calcium 11.1 (*)     AST(SGOT) 718 (*)     ALT(SGPT) 174 (*)     Alkaline Phosphatase 139 (*)     Total Bilirubin 8.2 (*)     Albumin 1.9 (*)     Total Protein 3.9 (*)     All other components within normal limits    Narrative:     Indicate which anticoagulants the patient is on:->NONE   PROTHROMBIN TIME - Abnormal; Notable for the following components:    PT 33.5 (*)     INR 3.23 (*)     All other components within normal limits    Narrative:     Indicate which anticoagulants the patient is on:->NONE   COD (ADULT) - Abnormal; Notable for the following components:    Rh Grouping Only POS (*)     All other components within normal limits   PLATELET MAPPING WITH BASIC TEG - Abnormal; Notable for the following components:    Maximum Clot  Strength-MA 48.7 (*)     % Inhibition ADP 64.4 (*)     % Inhibition AA 57.6 (*)     All other components within normal limits    Narrative:     Do you want to extend TEG graph to LY30? (If no, graph will                  terminate at MA)->Yes   ESTIMATED GFR - Abnormal; Notable for the following components:    GFR (CKD-EPI) 36 (*)     All other components within normal limits    Narrative:     Indicate which anticoagulants the patient is on:->NONE   LACTIC ACID - Abnormal; Notable for the following components:    Lactic Acid 13.3 (*)     All other components within normal limits   CBC WITHOUT DIFFERENTIAL - Abnormal; Notable for the following components:    WBC 29.8 (*)     RBC 3.28 (*)     Hemoglobin 9.7 (*)     Hematocrit 29.2 (*)     RDW 52.7 (*)     Platelet Count 137 (*)     All other components within normal limits   FIBRINOGEN - Abnormal; Notable for the following components:    Fibrinogen 120 (*)     All other components within normal limits    Narrative:     Do you want to extend TEG graph to LY30? (If no, graph will                  terminate at MA)->Yes   AMMONIA - Abnormal; Notable for the following components:    Ammonia 648 (*)     All other components within normal limits   CBC WITHOUT DIFFERENTIAL - Abnormal; Notable for the following components:    WBC 25.7 (*)     RBC 3.82 (*)     Hemoglobin 11.4 (*)     Hematocrit 37.0 (*)     MCHC 30.8 (*)     RDW 57.1 (*)     Platelet Count 87 (*)     All other components within normal limits   CREATINE KINASE - Abnormal; Notable for the following components:    CPK Total 424 (*)     All other components within normal limits   POCT ARTERIAL BLOOD GAS DEVICE RESULTS - Abnormal; Notable for the following components:    Ph 7.100 (*)     Pco2 39.5 (*)     Po2 89 (*)     Tco2 13 (*)     Hco3 12.3 (*)     BE -17 (*)     Ph Temp Peggy 7.132 (*)     All other components within normal limits   POCT LACTATE DEVICE RESULTS - Abnormal; Notable for the following components:     iStat Lactate 12.4 (*)     All other components within normal limits   POCT GLUCOSE DEVICE RESULTS - Abnormal; Notable for the following components:    POC Glucose, Blood 147 (*)     All other components within normal limits   MASSIVE TRANSFUSION   DIAGNOSTIC ALCOHOL    Narrative:     Indicate which anticoagulants the patient is on:->NONE   ABO RH CONFIRM   DIFFERENTIAL MANUAL    Narrative:     Indicate which anticoagulants the patient is on:->NONE   PERIPHERAL SMEAR REVIEW    Narrative:     Indicate which anticoagulants the patient is on:->NONE   PLATELET ESTIMATE    Narrative:     Indicate which anticoagulants the patient is on:->NONE   MORPHOLOGY    Narrative:     Indicate which anticoagulants the patient is on:->NONE   IMMATURE PLT FRACTION   BETA-HYDROXYBUTYRIC ACID   PLATELETS REQUEST   COMPONENT CELLULAR   ARTERIAL BLOOD GAS   IONIZED CALCIUM   PATH INTERP HEME    Narrative:     Indicate which anticoagulants the patient is on:->NONE   URINE DRUG SCREEN   BASIC METABOLIC PANEL   CRYOPRECIPITATE   COMP METABOLIC PANEL   CBC WITHOUT DIFFERENTIAL   POCT GLUCOSE   POCT GLUCOSE   POCT GLUCOSE   POCT GLUCOSE   RELEASE PLATELET PHERESIS   TRANSFUSE CRYOPRECIPITATE-NURSING COMMUNICATION   TRANSFUSE PLATELET PHERESIS-NURSING COMMUNICATION         RADIOLOGY  I independently reviewed and interpreted the images obtained today in the ER.    Radiologist interpretation:   US-RUQ   Final Result         1. Cirrhotic liver.   2. Portal vein is patent.   3. Mild ascites.      DX-ABDOMEN FOR TUBE PLACEMENT   Final Result      Malpositioned nasogastric tube, projecting into the right lower lobe. This was discussed with the patient's nurse Caryn  at 0345 hours.      CT-HEAD W/O   Final Result         1. No evidence of acute intracranial hemorrhage or mass lesion.                     DX-CHEST-PORTABLE (1 VIEW)   Final Result         Endotracheal tube with tip projecting over the mid thoracic trachea.      Right central venous  catheter with tip projecting over the expected area of the SVC.      EC-ECHOCARDIOGRAM COMPLETE W/O CONT    (Results Pending)        COURSE & MEDICAL DECISION MAKING    ED COURSE:        INTERVENTIONS BY ME:  Medications   norepinephrine (Levophed) 8 mg in 250 mL NS infusion (premix) (0.11 mcg/kg/min × 100 kg (Order-Specific) Intravenous Rate Change 12/28/23 0615)   octreotide (SandoSTATIN) 1,250 mcg in  mL Infusion (50 mcg/hr Intravenous New Bag 12/28/23 0054)   pantoprazole (Protonix) 80 mg in  mL infusion (8 mg/hr Intravenous New Bag 12/28/23 0058)   Respiratory Therapy Consult (has no administration in time range)   senna-docusate (Pericolace Or Senokot S) 8.6-50 MG per tablet 2 Tablet (0 Tablets Enteral Tube Held 12/28/23 0600)     And   polyethylene glycol/lytes (Miralax) Packet 1 Packet (has no administration in time range)     And   magnesium hydroxide (Milk Of Magnesia) suspension 30 mL (has no administration in time range)     And   bisacodyl (Dulcolax) suppository 10 mg (has no administration in time range)   MD Alert...ICU Electrolyte Replacement per Pharmacy (has no administration in time range)   lidocaine (Xylocaine) 1 % injection 2 mL (has no administration in time range)   cefTRIAXone (Rocephin) syringe 1,000 mg (has no administration in time range)   acetaminophen (Tylenol) tablet 1,000 mg ( Enteral Tube See Alternative 12/28/23 0532)     Or   acetaminophen (Tylenol) suppository 975 mg (975 mg Rectal Given 12/28/23 0532)   busPIRone (Buspar) tablet 15 mg (0 mg Enteral Tube Held 12/28/23 0600)   sodium bicarbonate 150 mEq in D5W infusion (premix) ( Intravenous New Bag 12/28/23 0239)   hydrocortisone sodium succinate PF (Solu-CORTEF) 100 MG injection 50 mg (has no administration in time range)   thiamine (B-1) 250 mg in dextrose 5% 100 mL IVPB (250 mg Intravenous New Bag 12/28/23 0540)   insulin regular (HumuLIN R,NovoLIN R) injection (has no administration in time range)     And    dextrose 10 % BOLUS 25 g (has no administration in time range)   lactulose 10 GM/15ML solution 300 mL (300 mL Rectal Wasted 12/28/23 0600)   fentaNYL (Sublimaze) injection 100 mcg (100 mcg Intravenous Given 12/28/23 0546)     And   fentaNYL (Sublimaze) injection 200 mcg (has no administration in time range)     And   fentaNYL (SUBLIMAZE) 50 mcg/mL in 50mL (Continuous Infusion) ( Intravenous Canceled Entry 12/28/23 0415)     And   dexmedetomidine (PRECEDEX) 400 mcg/100mL NS premix infusion ( Intravenous Canceled Entry 12/28/23 0415)   vasopressin (Vasostrict) 20 Units in  mL Infusion (0.03 Units/min Intravenous New Bag 12/28/23 0507)   cefTRIAXone (Rocephin) injection 1,000 mg (1,000 mg Intravenous Given 12/28/23 0455)   dextrose 10 % BOLUS 25 g (0 g Intravenous Stopped 12/28/23 0241)     And   insulin regular (HumuLIN R,NovoLIN R) injection (6 Units Intravenous Given 12/28/23 0228)   sodium bicarbonate 8.4 % injection 50 mEq (50 mEq Intravenous Given 12/28/23 0230)   albuterol (Proventil) 2.5mg/0.5ml nebulizer solution 10 mg (10 mg Nebulization Given 12/28/23 0439)   calcium GLUConate 2 g in NaCl IVPB premix (0 g Intravenous Stopped 12/28/23 0335)   furosemide (Lasix) injection 100 mg (100 mg Intravenous Given 12/28/23 0229)   phytonadione (Aqua-Mephyton) 10 mg in NS 50 mL IVPB (0 mg Intravenous Stopped 12/28/23 0504)   sodium bicarbonate 8.4 % injection 100 mEq (100 mEq Intravenous Given 12/28/23 0417)   FENTANYL CITRATE (PF) 0.05 MG/ML INJ SOLN (WRAPPED) (  Return to ADS 12/28/23 0330)       Response on recheck:  12:20 AM patient on Levophed given suspected hemorrhagic shock will provide 2 more units of PRBCs 1 unit of FFP and 1 unit of platelets and reassess  1:15 AM patient normal tensive after receiving blood products        INITIAL ASSESSMENT, COURSE AND PLAN  Care Narrative:   Patient presenting intubated with a central line after sustaining a cardiac arrest during transportation at this facility,  etiology of cardiac arrest is unclear at this time certainly do suspect hemorrhagic shock as the principal cause the patient's cardiac arrest.  Patient's blood work notable for significant hyperkalemia which was discussed with the intensivist and treated in the emergency department.  Patient also had an acute kidney injury and significant leukocytosis.     Patient's hyperkalemia treated with calcium and insulin and albuterol in addition to bicarbonate     ADDITIONAL PROBLEM LIST    DISPOSITION AND DISCUSSIONS  I have discussed management of the patient with the following physicians and EDVIN's: Spoke to gastroenterologist 1:30 AM Dr. Briscoe who stated plan for endoscopy first thing in the morning  Spoke to intensivist Dr. Patiño at 1:45 AM accepted admission discussed workup and treatment of hyperkalemia    Discussion of management with other Q or appropriate source(s): Spoke to the pharmacist about sedation        FINAL DIAGNOSIS  1. Cardiac arrest (HCC)    2. Hemorrhagic shock (HCC)    3. Alcoholic cirrhosis, unspecified whether ascites present (HCC)    4. Hematochezia    5. Hyperkalemia             Electronically signed by: Hemal Liu DO ,6:16 AM 12/28/23

## 2023-12-28 NOTE — PROGRESS NOTES
GI team discussed this patient's situation with emergency provider.  Agree with impression, possible variceal bleeding, also agree to continue IV PPI, octreotide drip, IV antibiotic.  ICU admission overnight, n.p.o., we will try to scope patient early in the morning.

## 2023-12-28 NOTE — CONSULTS
Referral # [id number]    Please call [phone] select [phone], then select 2, and use the last [phone] of the referral number to contact the on-call coordinator with any updates.     Date: 12/28/23      Thank you for the referral of this patient. A chart review has been completed to determine suitability for organ and tissue donation.    *Donation is an option:    -Upon meeting the criteria for brain death this patient will be a potential candidate for organ donation, upon further evaluation.    -This patient may meet the criteria for DCD (donation after circulatory death). Further evaluation will be needed should the family decide to transition to comfort care.    -This patient has been located on an organ donor registry and is a first person authorized organ donor, in the event of their death. A copy of their document of gift has been placed in the hospital chart; it is a legally binding document.      *[facility] will continue to follow this case.    -Please contact the clinical coordinator with any changes in the patient's neurological or hemodynamic status, family questions/concerns/decisions, or changes in plan of care.    -Organ donation should not be mentioned to the family until the physician has discussed the patient's diagnosis and grave prognosis. Discussion of organ donation should then be a planned, one-time coordinated event in collaboration with [facility].        Thank you for your continued support of organ and tissue donation.

## 2023-12-28 NOTE — PROGRESS NOTES
"Critical Care Progress Note    Date of admission  12/28/2023    Chief Complaint  \"Reason for consult: hemorrhagic shock from upper gi bleed, cardiac arrest, respiratory failure, massive transfusion, hyperkalemia, esau, alcoholic cirrhosis     HPI: 28y M hx of alcohol abuse with 1pint of hard alcohol a day. Was recently admitted for upper GI bleed for variceal hemorrhage 12/18 requiring 7 bands. After leaving hospital patient started turning yellow per wife he has been trying to cut down on drinking but using gummies. He then last night started to vomiting bright red blood filling up an entire barf bag with bright red blood. She couldn't get him to come to hospital but tonight she was able to. He arrived and was being transferred here from outside hospital when he suffered a cardiac arrest requiring 5-10 minutes of CPR and was diverted to [facility] where he was intubated and had central line placed. He was obtained ROCS and transferred here for higher level of care. He is currently intubated so hx is taken from [facility] at bedside. He was able to be weaned off pressor on arrival here. He found to have esau with hyperkalemia to 7, Hg was 10.8 and plt of 92, INR of 3.2, bili of 8.2, cr of 2.45, bicarb of 11. He was given a total of 12 units PRBC,4 FFP, 1 PLT prior to arrival with TXA. GI has been consulted. He will be DNR I okay.\" Dr Patiño's note     Hospital Course  12/28/2023  Prior to the start of my shift, patient had received a total of 12 units of PRBCs, 4 units of FFP, 2 units of platelets, 1 unit of cryo.  Patient was noted to have ongoing melena and became hypotensive and tachycardic.  I ordered a red box and it was transfused via the [place] rapid infuser.  As patient became more stable, EGD was started by GI at bedside.  They were unable to visualize the esophagus and placed a [place] tube.    Patient continued to be hypotensive and second red box was started.  After placement of the [place] tube, " patient had increasing peak pressures and was desaturating.  I attempted to pass the bronchoscope through his ET tube with concern for hypoxemia due to aspiration of blood.  I was unable to pass the bronchoscope and noted that the ET tube was completely occluded, presumably from the [place] tube.  We had to give the patient propofol and paralytics in order to open his mouth for further inspection.  When we were able to open his mouth we noted that the esophageal portion of the [place] tube was inflated in his posterior pharynx causing compression of his tongue and ET tube.  The tube was deflated both in the esophageal and gastric balloons.  Bronchoscopy was completed at this time showing some minimal blood, mostly in the right mainstem which was suctioned out.  I was unable to fully inspect his airways due to the acuity of his illness.  After deflation of the [place] tube, patient continued to have ongoing bleeding from his mouth and rectum.  I advanced the [place] tube down and attempted to inflate the gastric balloon.  At this point,  from GI presented to bedside to help and pulled out the [place] tube due to concern for the gastric balloon malfunctioning.  He placed a new [place] tube which was confirmed on x-ray.  During this time, patient continued to have episodes of hypotension and received a third red box of blood.  Ventilator had to be increasingly titrated up due to hypoxemia, patient is currently on 100% FiO2 and 16 of PEEP    Interval Problem Update  Chart review from the past 24 hours includes imaging, laboratory studies, vital signs and notes available.  Pertinent data for today's visit includes    Neuro: encephalopathic  Cardiac: shock, vasoplegic after cardiac arrest  Pulm: on vent 26/450/14/80%   Heme: Hg down to 9.7 from 11.4  /renal: Cr up to 2.83, AGMA   GI: T bili up to 9.5 from 8.2, AST 1964, , INR 3.23   Endo: 6 units ISS  ID:  WBC [id number]   I/O: NA  Additional  Labs/Imaging:   US with cirrhosis no portal vein thrombosis     Review of Systems   Unable to perform ROS: Intubated      Vital Signs for last 24 hours   Temp:  [31.7 °C (89.1 °F)-35.4 °C (95.7 °F)] 35.4 °C (95.7 °F)  Pulse:  [] 124  Resp:  [0-51] 51  BP: ()/() 153/66  SpO2:  [59 %-100 %] 92 %    Hemodynamic parameters for last 24 hours       Respiratory Information for the last 24 hours  Vent Mode: APVCMV  Rate (breaths/min): 34  Vt Target (mL): 450  PEEP/CPAP: 14  MAP: 28  Control VTE (exp VT): 448    Physical Exam  Vitals and nursing note reviewed.   Constitutional:       Appearance: He is obese. He is ill-appearing.   HENT:      Head: Normocephalic.      Mouth/Throat:      Comments: Covered in blood    Eyes:      General: Scleral icterus present.   Cardiovascular:      Rate and Rhythm: Regular rhythm. Tachycardia present.   Pulmonary:      Effort: Pulmonary effort is normal.      Breath sounds: Rales present.   Abdominal:      General: There is distension.   Musculoskeletal:      Right lower leg: Edema present.      Left lower leg: Edema present.   Skin:     Comments: Cool, clammy skin   Neurological:      Comments: GCS 3      Medications  Current Facility-Administered Medications   Medication Dose Route Frequency Provider Last Rate Last Admin    octreotide (SandoSTATIN) 1,250 mcg in  mL Infusion  50 mcg/hr Intravenous Continuous Hector Patiño M.D. 10 mL/hr at 12/28/23 0754 50 mcg/hr at 12/28/23 0754    pantoprazole (Protonix) 80 mg in  mL infusion  8 mg/hr Intravenous Continuous Hector Patiño M.D. 25 mL/hr at 12/28/23 1052 8 mg/hr at 12/28/23 1052    Respiratory Therapy Consult   Nebulization Continuous RT Hector Patiño M.D.        senna-docusate (Pericolace Or Senokot S) 8.6-50 MG per tablet 2 Tablet  2 Tablet Enteral Tube BID Hector Patiño M.D.        And    polyethylene glycol/lytes (Miralax) Packet 1 Packet  1 Packet Enteral Tube QDAY PRN Hector Patiño M.D.         And    magnesium hydroxide (Milk Of Magnesia) suspension 30 mL  30 mL Enteral Tube QDAY PRN Hector Patiño M.D.        And    bisacodyl (Dulcolax) suppository 10 mg  10 mg Rectal QDAY PRN Hector Patiño M.D. MD Alert...ICU Electrolyte Replacement per Pharmacy   Other PHARMACY TO DOSE Hector Patiño M.D.        lidocaine (Xylocaine) 1 % injection 2 mL  2 mL Tracheal Tube Q30 MIN PRN Hector Patiño M.D.        cefTRIAXone (Rocephin) syringe 1,000 mg  1,000 mg Intravenous Q24HRS Hector Patiño M.D.        [Held by provider] busPIRone (Buspar) tablet 15 mg  15 mg Enteral Tube BID Sylvia Chavez M.D.        sodium bicarbonate 150 mEq in D5W infusion (premix)   Intravenous Continuous Sylvia Chavez M.D. 100 mL/hr at 12/28/23 1249 New Bag at 12/28/23 1249    thiamine (B-1) 250 mg in dextrose 5% 100 mL IVPB  250 mg Intravenous Q8HRS Hector Patiño M.D.   Stopped at 12/28/23 0612    insulin regular (HumuLIN R,NovoLIN R) injection  2-9 Units Subcutaneous Q6HRS Hector Patiño M.D.        And    dextrose 10 % BOLUS 25 g  25 g Intravenous Q15 MIN PRN Hector Patiño M.D.        lactulose 10 GM/15ML solution 300 mL  300 mL Rectal Q6HRS Sylvia Chavez M.D.        fentaNYL (Sublimaze) injection 100 mcg  100 mcg Intravenous Q15 MIN PRN Sylvia Chavez M.D.   100 mcg at 12/28/23 0546    And    fentaNYL (Sublimaze) injection 200 mcg  200 mcg Intravenous Q15 MIN PRN Sylvia Chavez M.D.        And    fentaNYL (SUBLIMAZE) 50 mcg/mL in 50mL (Continuous Infusion)   Intravenous Continuous Sylvia Chavez M.D.        vasopressin (Vasostrict) 20 Units in  mL Infusion  0.03 Units/min Intravenous Continuous Sylvia Chavez M.D. 9 mL/hr at 12/28/23 0754 0.03 Units/min at 12/28/23 0754    propofol (DIPRIVAN) injection  0-80 mcg/kg/min (Ideal) Intravenous Continuous Daniella Kelly D.O. 9 mL/hr at 12/28/23 1211 20 mcg/kg/min at 12/28/23 1211    norepinephrine (Levophed) 8 mg in 250 mL NS infusion  (premix)  0-1 mcg/kg/min (Ideal) Intravenous Continuous RAMIN Cobian.O. 7.1 mL/hr at 12/28/23 1354 0.05 mcg/kg/min at 12/28/23 1354    EPINEPHrine (Adrenalin) infusion 4 mg/250 mL (premix)  0-0.5 mcg/kg/min (Ideal) Intravenous Continuous RAMIN Cobian.O.   Stopped at 12/28/23 0901    PHENYLEPHRINE HCL-NACL 1-0.9 MG/10ML-% IV SOSY             methylPREDNISolone (SOLU-MEDROL) 40 MG injection 40 mg  40 mg Intravenous DAILY RAMIN Cobian.O.   40 mg at 12/28/23 1233    CALCIUM CHLORIDE 10 % IV SOLN             ROCURONIUM BROMIDE 50 MG/5ML IV SOLN             midazolam (Versed) premix 125 mg/125 mL infusion  0-10 mg/hr Intravenous Continuous RAMIN Cobian.O. 2 mL/hr at 12/28/23 1045 2 mg/hr at 12/28/23 1045    SODIUM BICARBONATE 8.4 % IV SOLN              Fluids    Intake/Output Summary (Last 24 hours) at 12/28/2023 1408  Last data filed at 12/28/2023 1334  Gross per 24 hour   Intake 1508.82 ml   Output 1515 ml   Net -6.18 ml     Laboratory  Recent Labs     12/28/23  0830 12/28/23  1109 12/28/23  1317   ISTATAPH 7.213* 7.073* 7.237*   ISTATAPCO2 39.5* 50.9* 36.7   ISTATAPO2 60* 57* 56*   ISTATATCO2 17* 16* 17*   AKCOCFR0BDO 85* 76* 83*   ISTATARTHCO3 15.9* 14.9* 15.6*   ISTATARTBE -11* -15* -11*   ISTATTEMP 35.5 C 35.5 C 35.3 C   ISTATFIO2 80 100 100   ISTATSPEC Arterial Arterial Arterial   ISTATAPHTC 7.234* 7.092* 7.260*   XZYPGYFP2UN 54* 51* 49*     Recent Labs     12/28/23  0154   CPKTOTAL 424*     Recent Labs     12/28/23  0550 12/28/23  1105 12/28/23  1315   SODIUM 136 141 143   POTASSIUM 4.5 4.7 4.9   CHLORIDE 99 98 97   CO2 15* 14* 14*   BUN 35* 31* 30*   CREATININE 2.83* 2.79* 2.81*   CALCIUM 8.9 8.6 9.1     Recent Labs     12/28/23  0029 12/28/23  0550 12/28/23  1105 12/28/23  1315   ALTSGPT 174* 419* 298*  --    ASTSGOT 718* 1964* 1323*  --    ALKPHOSPHAT 139* 112* 67  --    TBILIRUBIN 8.2* 9.5* 5.8*  --    GLUCOSE 191* 156* 127* 133*     Recent Labs     12/28/23  0029  12/28/23  0030 12/28/23  0030 12/28/23  0149 12/28/23  0550 12/28/23  1105   WBC  --  34.7*   < > 25.7* 29.8* 19.4*   NEUTSPOLYS  --  63.70  --   --   --   --    LYMPHOCYTES  --  7.30*  --   --   --   --    MONOCYTES  --  4.00  --   --   --   --    EOSINOPHILS  --  1.60  --   --   --   --    BASOPHILS  --  0.80  --   --   --   --    ASTSGOT 718*  --   --   --  1964* 1323*   ALTSGPT 174*  --   --   --  419* 298*   ALKPHOSPHAT 139*  --   --   --  112* 67   TBILIRUBIN 8.2*  --   --   --  9.5* 5.8*    < > = values in this interval not displayed.     Recent Labs     12/28/23  0149 12/28/23  0550 12/28/23  0750 12/28/23  1105 12/28/23  1315   RBC 3.82* 3.28*  --  3.28*  --    HEMOGLOBIN 11.4* 9.7*  --  10.1*  --    HEMATOCRIT 37.0* 29.2*  --  30.9*  --    PLATELETCT 87* 137*  --  120*  --    PROTHROMBTM  --   --  39.0* 21.8* 19.5*   INR  --   --  3.92* 1.87* 1.63*       Imaging  Ultrasound:  Reviewed    Assessment/Plan  * Esophageal varices with bleeding in diseases classified elsewhere (HCC)- (present on admission)  Assessment & Plan  Status post attempted banding on 12/28/2023, [place] tube placed on 12/28  Blood product count: PRBC 24, FFP 16, platelet 4, cryo 2  Protonix gtt   Octreotide gtt  GI following, will attempt to retry EGD if he is able to stop bleeding in the next couple of days   Not a candidate for TIPS  Ceftriaxone for prophylaxis    Encephalopathy, anoxic, hepatic, toxic  Assessment & Plan  Patient did undergo cardiac arrest and has had episodes of hypoxemia today.  He also has an elevated ammonia level with known cirrhosis.  Furthermore, patient is acidotic and uremic.  CRRT to correct uremia and acidosis  Cannot give rectal lactulose at this time due to use of [place] tube  Strict n.p.o., patient cannot receive lactulose at this time  Will continue to monitor mental status    Hemorrhagic shock (HCC)  Assessment & Plan  Currently normotensive, most recent hemoglobin stable    Continue to monitor  hemoglobin  Conservative transfusion goal of 7 unless patient is floridly bleeding or hypotensive    Acute hypoxemic respiratory failure (HCC)  Assessment & Plan  Suspect due to aspiration and fluid overload  Continue with ceftriaxone  Plan for CRRT    Lactic acidosis  Assessment & Plan  Due to hemorrhagic shock and cardiac arrest  Continue to trend  Planning for CRRT now      Liver failure (HCC)  Assessment & Plan  Acute on chronic, acute component likely secondary to alcoholic hepatitis versus shock liver  Madrey discriminant function is 120 points    Will give Solu-Medrol 40 mg daily for alcoholic hepatitis  Hepatic dosing for medications  Cirrhosis management will be deferred to outpatient    Acute renal failure with tubular necrosis (HCC)  Assessment & Plan  Due to hemorrhagic shock and cardiac arrest    Follow-up urinalysis  Plan for CRRT   Continue to monitor electrolytes and ABG every 6 hours      Alcohol dependence (HCC)- (present on admission)  Assessment & Plan  Will give IV thiamine and multivitamins    Cirrhosis, Likely alcoholic- (present on admission)  Assessment & Plan  MELD 40   With bleeding varices, see plan below       Lines: Right subclavian CVC 12/28  Tubes: Cottrell 12/28, ETT 12/28  Diet: strict NPO   DVT ppx: SCDs  GI ppx: Protonix BID  Bowel regiment: no  Ventilator ppx: Cholorohexadine, HOB> 30, oral care    I have performed a physical exam and reviewed and updated ROS and Plan today (12/28/2023). In review of yesterday's note (12/27/2023), there are no changes except as documented above.     Discussed patient condition and risk of morbidity and/or mortality with Family, RN, RT, Pharmacy, [employer] [facility] resident, Code status disscussed, and GI and nephrology    The patient remains critically ill.  Critical care time = 359 minutes in directly providing and coordinating critical care and extensive data review.  No time overlap and excludes procedures. This is in addition to the time spent by   Haroon.     Daniella Kelly, DO   Pulmonary and Critical Care

## 2023-12-29 PROBLEM — I85.01 ESOPHAGEAL VARICES WITH HEMORRHAGE (HCC): Status: ACTIVE | Noted: 2023-01-01

## 2023-12-29 PROBLEM — I46.9 CARDIAC ARREST (HCC): Status: ACTIVE | Noted: 2023-01-01

## 2023-12-29 NOTE — PROGRESS NOTES
[facility] Progress Note     STAT CKRT -CVVHD ordered today per Dr. Samuel Baxter for massive fluid overload  Patient needed to be moved and cleaned prior to CKRT; Brochoscopy and Arterial catheter placement done by Dr. Anatoliy Hi  BP soft; arterial line not correlating with Blood pressure cuff monitoring via BP cuff  For now    Consent for treatment signed by the brother Alfred Carlos; no questions at this time.  Treatment plan explained by Intensivist Dr. GEREMIAS Kelly; verbalized understanding of CRRT/dialysis  Right IJ tunneled CVC verified via X-ray Okay to use per Dr. Daniella Kelly  Dressing clean dry and intact; patent    Treatment initiated at 1829  Patient receiving around 672 mL every hour  UF set to 200 initially due to soft BP at 100's ; titrating accordingly to achieve net UF  Still with soft BP at 100's  Report to Kellee Santiago primary RN with plan of care for now until BP stabilized and tolerating net UF      .

## 2023-12-29 NOTE — PROGRESS NOTES
Hypoglycemia Intervention    Hypoglycemia protocol intervention:  Blood glucose 62 at 1100 (lab result)  Intervention: 25 g IV dextrose per MAR   Repeat blood glucose 114 at 1225.  Intervention: Patient NPO, recheck blood glucose in 1 hour   Additional interventions needed: NA  Dr. Kelly notified of the above at 1152.

## 2023-12-29 NOTE — ASSESSMENT & PLAN NOTE
Multifactorial, Patient did suffer cardiac arrest and bouts of hypoxemia 12/28.  Significantly elevated ammonia in 600s on presentation as well in the setting of cirrhosis.  Also significantly acidotic/uremic in the setting DB/ATN and lactic acidosis.    -Continue CRRT  -Reinitiating rectal Lactulose  -Continue to monitor mentation/neurological status

## 2023-12-29 NOTE — ASSESSMENT & PLAN NOTE
Cirrhosis likely secondary to significant alcohol abuse. MELD score of 40. Presenting with resultant variceal hemorrhage.     -Not a candidate for TIPS at this time  -Avoid hepatoxic agents  -Hepatically adjust medications

## 2023-12-29 NOTE — PROGRESS NOTES
CVVHD treatment in progress.patient tolerated gentle UF removal overnight.BP soft.K has been trending up.latest result at 0505 ( 5.8 )  On call nephrologist DR Jimenez notified at 0622.Order receive to switch TF to 2K.Cartridge change not required at this time.  Dr BETSY Davalos given update on patient.      Net UF : 2346 ml ( [phone] )      TF: 2K  TFR :4.8 L/hr  BFR : 250 ml/min  UF : 150 ml/hr    Instruction given to Primary Rn Kellee to reach out to ext [phone]  or Voalte message for any machine issues.

## 2023-12-29 NOTE — DIETARY
NUTRITION SERVICES: BMI - Pt with BMI >40 (=Body mass index is 49.1 kg/m².), Class III obesity. Weight loss counseling not appropriate in acute care setting. RECOMMEND - monitoring overall POC.

## 2023-12-29 NOTE — PROCEDURES
Date of service:  12/28/2023    Title:  Arterial catheter placement - axillary artery    Indication: Hemorrhagic shock    Narrative:    A time out was performed identifying the correct patient, correct procedure and correct location for this procedure prior to performing this procedure.    The right arm was prepped with chlorhexidine and draped in the usual sterile fashion.  A 20 gauge arterial catheter was placed into the right axillary artery under ultrasound guidance using the technique described by David without difficulty or apparent complication.  The guidewire was removed intact.  The line was sutured into place and a sterile dressing was placed over the line.  An outstanding arterial waveform is present on the monitor.  The patient tolerated the procedure quite nicely.  No complications are apparent.    This was an EMERGENT procedure.  It was medically necessary to perform this procedure emergently to prevent life threatening deterioration.      Anatoliy Hi MD  Pulmonary and Critical Care Medicine

## 2023-12-29 NOTE — ASSESSMENT & PLAN NOTE
Secondary to hemorrhagic shock as well as recent cardiac arrest.    -Trend until normalization  -Continue CRRT

## 2023-12-29 NOTE — PROGRESS NOTES
"[facility] -  PROGRESS NOTE               Author: Jj Carlisle M.D. Date & Time: 12/29/2023  10:52 AM     HPI:  28Y  M w hx of HTN, alcohol abuse and cirrhosis is transferred to [facility] for higher level of care after vomiting blood and suffering a cardiac arrest. Pt is intubated and history is from the chart. Pt was recently admitted at [facility] for variceal bleeding on 12/18. He was discharged and started vomiting blood again the night before. He went to [facility] where he was intubated after suffering a cardiac arrest. ROSC was achieved and pt was transferred to [facility] for higher level of care. He was given multiple blood products, pRBC, platelets, FFP, with almost 45u of pRBC received. Cr noted to be 2.45 on arrival, (was 0.6 on 12/18/23) and kaylen to 2.8. He is not making any urine. Case was discussed with family at bedside.      DAILY NEPHROLOGY SUMMARY:  12/28: Consult done  12/29: NAEO, clinically unchanged, tolerated CRRT well overnight; Future MIL at bedside    REVIEW OF SYSTEMS:    10 point ROS reviewed and is as per HPI/daily summary or otherwise negative    PMH/PSH/SH/FH:   Reviewed and unchanged since admission note    CURRENT MEDICATIONS:   Reviewed from admission to present day    VS:  /46   Pulse 78   Temp 36.1 °C (97 °F) (Temporal)   Resp (!) 32   Ht 1.803 m (5' 10.98\")   Wt (!) 160 kg (351 lb 13.7 oz)   SpO2 97%   BMI 49.10 kg/m²     Physical Exam  Nursing note reviewed.   Constitutional:       Appearance: He is ill-appearing.      Interventions: He is sedated and intubated.   Eyes:      General: No scleral icterus.  Cardiovascular:      Comments: +Anasarca  Pulmonary:      Effort: Pulmonary effort is normal. He is intubated.   Abdominal:      General: There is no distension.   Musculoskeletal:         General: No deformity.   Skin:     Findings: No rash.       Fluids:  In: 6392.4 [I.V.:4060.5; Blood:150; Dialysis:271]  Out: 2711     LABS:  Recent " Labs     12/28/23  1717 12/29/23  0035 12/29/23  0505   SODIUM 142 139 138   POTASSIUM 5.0 5.4 5.8*   CHLORIDE 96 97 99   CO2 13* 14* 13*   GLUCOSE 125* 108* 80   BUN 33* 27* 23*   CREATININE 3.38* 2.73* 2.52*   CALCIUM 9.0 8.4* 8.5       IMAGING:   All imaging reviewed from admission to present day    IMPRESSION:  # DB  - Etiology likely 2/2 ATN  - Oligoanuric  - CRRT since 12/28  # Hyperkalemia  - Due to DB/Cardiac arrest  - CRRT ongoing  # Sepsis  - Etiology unclear  - On pressors  # Acute respiratory failure with hypoxia  - Intubated 12/28  # Acidosis  - +anion gap, due to ischemia   # Acute liver injury  - Ischemia induced    PLAN:  - Continue CRRT on 2K bath now due to hyperK  - Monitor I/O  - Renal panel Q6 to monitor K+ and PO4  - Titrate UF as tolerated  - Maintain MAP > 60  - May consider iHD once more stable  - Discussed plan and current condition with family at bedside as well as RN    I have personally reviewed all laboratory values, microbiology studies, radiographic images, and current medications.  The patient is critically ill with one or more organ system(s) failing and without intervention, survival is jeopardized.  To prevent further life threatening deterioration and/or organ failure, I have spent Critical Care time in direct patient care/floor time, of HIGH Medical decision making, exclusive of procredures, and without overlapping physician care.    Brief Description of CC services performed:  Evaluation of notes from various team members for RRT needs and arrangement of it if needed.  Discussion with staff regarding renal care and plan moving forward.    Total CC Time = 31 minutes

## 2023-12-29 NOTE — ASSESSMENT & PLAN NOTE
Likely secondary to aspiration from upper GI hemorrhage and volume overload. Intubated 12/28.    -Continuous pulse oximetry  -RT protocol  -HOB >30 degrees  -GI prophylaxis  -Daily CXRs  -Daily/PRN ABGs  -Titrate ventilator settings as appropriate  -Continue empiric C3, flagyl   -Continue CRRT

## 2023-12-29 NOTE — PROCEDURES
"Arterial Line Insertion    Date/Time: 12/28/2023 4:03 PM    Performed by: Daniella Kelly D.O.  Authorized by: Daniella Kelly D.O.  Consent: The procedure was performed in an emergent situation.  Time out: Immediately prior to procedure a \"time out\" was called to verify the correct patient, procedure, equipment, support staff and site/side marked as required.  Preparation: Patient was prepped and draped in the usual sterile fashion.  Indications: multiple ABGs and hemodynamic monitoring  Location: right radial    Sedation:  Patient sedated: yes    Reagan's test normal: yes  Needle gauge: 18  Seldinger technique: Seldinger technique used  Number of attempts: 1  Post-procedure: line sutured and dressing applied  Post-procedure CMS: normal  Patient tolerance: patient tolerated the procedure well with no immediate complications        Daniella Kelly DO   Pulmonary and Critical Care        "

## 2023-12-29 NOTE — CONSULTS
"[facility] -  CONSULTATION NOTE    Date & Time:   12/28/2023  4:17 PM    Author:   Samuel Baxter D.O.      REASON FOR CONSULTATION:   -DB      CHIEF COMPLAINT:   -  \"GI Bleed  \"      HISTORY OF PRESENT ILLNESS:    28Y  M w hx of HTN, alcohol abuse and cirrhosis is transferred to [facility] for higher level of care after vomiting blood and suffering a cardiac arrest. Pt is intubated and history is from the chart. Pt was recently admitted at [facility] for variceal bleeding on 12/18. He was discharged and started vomiting blood again the night before. He went to [facility] where he was intubated after suffering a cardiac arrest. ROSC was achieved and pt was transferred to [facility] for higher level of care. He was given multiple blood products, pRBC, platelets, FFP, with almost 45u of pRBC received. Cr noted to be 2.45 on arrival, (was 0.6 on 12/18/23) and kaylen to 2.8. He is not making any urine. Case was discussed with family at bedside.       REVIEW OF SYSTEMS:    UTO due to intubation      PAST MEDICAL HISTORY:   Past Medical History:   Diagnosis Date    Hypertension     Alcoholic Liver Disease      PAST SURGICAL HISTORY:   Past Surgical History:   Procedure Laterality Date    CT UPPER GI ENDOSCOPY,DIAGNOSIS N/A 12/15/2023    Procedure: GASTROSCOPY;  Surgeon: Benson Lott M.D.;  Location: SURGERY SAME DAY [facility];  Service: Gastroenterology    CT UPPER GI ENDOSCOPY,LIGAT VARIX N/A 12/15/2023    Procedure: GASTROSCOPY, WITH VARICEAL BANDING;  Surgeon: Benson Lott M.D.;  Location: SURGERY SAME DAY [facility];  Service: Gastroenterology          FAMILY HISTORY:   No family history on file.    SOCIAL HISTORY:   Social History     Tobacco Use    Smoking status: Never     Passive exposure: Never    Smokeless tobacco: Never   Vaping Use    Vaping Use: Never used   Substance Use Topics    Alcohol use: Yes     Alcohol/week: 7.2 oz     Types: 12 Shots of liquor per week    Drug use: Never      " "      HOME MEDICATIONS: Reviewed  Home Medications    Medication Sig Taking? Last Dose Authorizing Provider   omeprazole (PRILOSEC) 40 MG delayed-release capsule Take 1 Capsule by mouth every day.   Cb Naidu M.D.   propranolol (INDERAL) 10 MG Tab Take 1 Tablet by mouth 2 times a day.   Cb Naidu M.D.       ALLERGIES:  Patient has no known allergies.      VITAL SIGNS:  BP 97/51   Pulse 100   Temp (!) 35.4 °C (95.7 °F) (Bladder)   Resp (!) 34   Ht 1.803 m (5' 10.98\")   Wt 124 kg (273 lb 5.9 oz)   SpO2 94%   BMI 38.14 kg/m²     Exam  General: intubated, sedated  HEENT: head normocephalic, ETT tube in place, NGT in place  Neck: neck supple, no visible masses  Respiratory: coarse ventilator breath sounds  Cardiac: normal rate, normal rhythm,   Abdomen: non tender, distended, no guarding  Extremities: no significant joint abnormalities, 3+ edema  Skin: no lesions, no rashes noted    Access: pending      FLUID BALANCE:  In: 773 [I.V.:441.8]  Out: 1100       LABS:  Recent Labs     12/28/23  0550 12/28/23  1105 12/28/23  1315   SODIUM 136 141 143   POTASSIUM 4.5 4.7 4.9   CHLORIDE 99 98 97   CO2 15* 14* 14*   GLUCOSE 156* 127* 133*   BUN 35* 31* 30*   CREATININE 2.83* 2.79* 2.81*   CALCIUM 8.9 8.6 9.1      Recent Labs     12/28/23  0550 12/28/23  1105 12/28/23  1315   SODIUM 136 141 143   POTASSIUM 4.5 4.7 4.9   CHLORIDE 99 98 97   CO2 15* 14* 14*   GLUCOSE 156* 127* 133*   BUN 35* 31* 30*   CREATININE 2.83* 2.79* 2.81*          IMAGING:  - Imaging studies reviewed by me    12/28 CXR  IMPRESSION:     Endotracheal tube with tip projecting over the mid thoracic trachea.     Right central venous catheter with tip projecting over the expected area of the SVC.    12/28 RUQ US  IMPRESSION:     1. Cirrhotic liver.  2. Portal vein is patent.  3. Mild ascites.    ASSESSMENTS:    -DB 2/2 hemorrhagic shock, cardiac arrest, and liver disease  -Hyperkalemia  -Lactic Acidosis  -Hypervolemia  -Acute GI/Variceal " Bleeding    S/p ~45 units of pRBC, plalets and FFP  -Shock, likely hemorraghic  -s/p cardiac arrest (outside hospital)  -Alcoholic Cirrhosis, Meld Na 37  -Hyperammonemia/hepatic encephalopathy  -Acure Respiratory Failure, s/p intubation      PLAN    Pt has been anuric and is severely volume overloaded after massive transfusion/resuscitation. Pt was initially on 3 pressors but is being weaned off. Discussed the patient's critically ill state and need for renal replacement therapy with multiple family members at bedside, and they desire to pursue dialysis at this time    -will start CRRT/CVVHD in light of his tenuous hemodynamics and massive transfusion requirements  -will aim for net -200cc/hr UF as tolerated  -saline flushes for circuit anticoagulation/priming to avoid risk of bleeding with heparin  -check CBC, BMP, Phos q6h, replace phos if levels are low  -dose all meds for CRRT    Dispo: CRRT for now, can consider SLED and iHD if hemodynamics improve/stabilize    I have personally spent 30 minutes of critical care time, exclusive of time spent on any procedures, in evaluation and management of this critically ill patient’s condition of acute renal failure. I provided the following critical care treatment of acute renal replacement therapy.     Discussion was had with care team members regarding events and plan of care moving forward.     Thank you for this consult and allowing us to participate in the patient's care.  Please page nephrology with any questions or concerns

## 2023-12-29 NOTE — ASSESSMENT & PLAN NOTE
Suspect due to aspiration PNA and fluid overload  - Good fluid removal achieved with CRRT, will reduce fluid removal  - Abx: Day 4 C3, Day 3 flagyl  - ABG  - ETT/Ventilation

## 2023-12-29 NOTE — PROGRESS NOTES
[facility] Progress Note     CKRT -CVVHD ongoing tolerating slow and minimal UF  Currently on 2 vasopressors with minimal room for titrating up; 3rd vasopressor on standby  Pt currently receiving around 672 mL of fluid; UF set at 250 for now   Requires titration accordingly to achieve net UF limited due to soft BP via bp cuff at 100's  Arterial BP at 80's.  Report and in service  given to Primary RN Dolores Dorado.    Icluding emergency return of blood;  to call for persistent machine alarms via dialysis on call no at [phone]    @ 20:00 Bicarb IV drip discontinued; Rally Bag IV completed  Currently on net UF + 50 mL on top of intake; titrating accordingly by primary RN

## 2023-12-29 NOTE — ASSESSMENT & PLAN NOTE
Secondary to hemorrhagic shock and cardiac arrest. Nephology consulted, appreciate support    -Pending repeat UA, however anuric  -Continue CRRT   -Serial BMP's, electrolytes

## 2023-12-29 NOTE — ASSESSMENT & PLAN NOTE
Recent hospitalization with 7 bands placed 12/18. Presenting with recurrent hemorrhage with attempted banding on 12/28/2023, unsuccessful. [place] tube therefore placed on 12/28. Total amount of Blood products received thus far: PRBC 24, FFP 17, platelet 4, cryo 2. Successful EDG w/ esophageal banding x 6 12/29.     -GI following, appreciate support  -Continue Protonix gtt, Octreotide gtt  -Not a candidate for TIPS given MELD score of 40  -Continue prophylactic C3, flagyl

## 2023-12-29 NOTE — PROCEDURES
Central Line Insertion    Date/Time: 12/28/2023 4:11 PM    Performed by: Daniella Kelly D.O.  Authorized by: Daniella Kelly D.O.    Consent:     Consent obtained:  Emergent situation    Consent given by:  Parent  Pre-procedure details:     Hand hygiene: Hand hygiene performed prior to insertion      Sterile barrier technique: All elements of maximal sterile technique followed      Skin preparation:  2% chlorhexidine    Skin preparation agent: Skin preparation agent completely dried prior to procedure    Sedation:     Sedation type:  Deep  Anesthesia:     Anesthesia method:  Local infiltration    Local anesthetic:  Lidocaine 1% w/o epi  Procedure details:     Location:  R internal jugular    Patient position:  Flat    Procedural supplies:  Triple lumen    Catheter size: 13 Fr.    Landmarks identified: yes      Ultrasound guidance: yes      Sterile ultrasound techniques: Sterile gel and sterile probe covers were used      Number of attempts:  1    Successful placement: yes    Post-procedure details:     Post-procedure:  Dressing applied and line sutured    Guidewire: guidewire removal confirmed      Assessment:  Blood return through all ports, no pneumothorax on x-ray, placement verified by x-ray and free fluid flow    Patient tolerance of procedure:  Tolerated well, no immediate complications    HD catheter    Daniella Kelly DO   Pulmonary and Critical Care

## 2023-12-29 NOTE — PROCEDURES
Bronchoscopy Procedure Note      Results/Findings: Aspiration of blood in airways    Specimen: none    Location: [facility] [place], [place]     Date of Operation: 12/28/2023     Attending Physician Performing Procedure: Daniella Kelly D.O.     Pre-op Diagnosis: aspiration, hypoxia    Post-op Diagnosis: same    Anesthesia: by myself    Operation:   Diagnostic flexible fiberoptic bronchoscopy with aspiration of blood    Estimated Blood Loss: none    Complications: none    Indications and History:    Emergency need for bronchoscopy     Description of Procedure:    The patient was identified as Mk Virk and a Time Out was held and the above information confirmed.   To pass the bronchoscope through the ET tube, however, I was unable to pass it due to restriction from the [place] tube.  After deflation of the [place] tube, passed the ET tube and readjusted to 2 to 3 cm above the ham.  Very quick inspection of the airway showed that there was some aspirated blood, particular in the right mainstem bronchus.  I was unable to completely traverse the right side of his lung due to instability.      Daniella Kelly DO   Pulmonary and Critical Care

## 2023-12-29 NOTE — HOSPITAL COURSE
"\"HPI: 28y M hx of alcohol abuse with 1pint of hard alcohol a day. Was recently admitted for upper GI bleed for variceal hemorrhage 12/18 requiring 7 bands. After leaving hospital patient started turning yellow per wife he has been trying to cut down on drinking but using gummies. He then last night started to vomiting bright red blood filling up an entire barf bag with bright red blood. She couldn't get him to come to hospital but tonight she was able to. He arrived and was being transferred here from outside hospital when he suffered a cardiac arrest requiring 5-10 minutes of CPR and was diverted to [facility] where he was intubated and had central line placed. He was obtained ROCS and transferred here for higher level of care. He is currently intubated so hx is taken from [facility] at bedside. He was able to be weaned off pressor on arrival here. He found to have esau with hyperkalemia to 7, Hg was 10.8 and plt of 92, INR of 3.2, bili of 8.2, cr of 2.45, bicarb of 11. He was given a total of 12 units PRBC,4 FFP, 1 PLT prior to arrival with TXA. GI has been consulted. He will be DNR I okay.\" -Per Dr. Patiño's Note         \"12/28/2023  Prior to the start of my shift, patient had received a total of 12 units of PRBCs, 4 units of FFP, 2 units of platelets, 1 unit of cryo.  Patient was noted to have ongoing melena and became hypotensive and tachycardic.  I ordered a red box and it was transfused via the Adams rapid infuser.  As patient became more stable, EGD was started by GI at bedside.  They were unable to visualize the esophagus and placed a [place] tube.    Patient continued to be hypotensive and second red box was started.  After placement of the [place] tube, patient had increasing peak pressures and was desaturating.  I attempted to pass the bronchoscope through his ET tube with concern for hypoxemia due to aspiration of blood.  I was unable to pass the bronchoscope and noted that the ET tube was " "completely occluded, presumably from the [place] tube.  We had to give the patient propofol and paralytics in order to open his mouth for further inspection.  When we were able to open his mouth we noted that the esophageal portion of the [place] tube was inflated in his posterior pharynx causing compression of his tongue and ET tube.  The tube was deflated both in the esophageal and gastric balloons.  Bronchoscopy was completed at this time showing some minimal blood, mostly in the right mainstem which was suctioned out.  I was unable to fully inspect his airways due to the acuity of his illness.  After deflation of the [place] tube, patient continued to have ongoing bleeding from his mouth and rectum.  I advanced the [place] tube down and attempted to inflate the gastric balloon.  At this point,  from GI presented to bedside to help and pulled out the [place] tube due to concern for the gastric balloon malfunctioning.  He placed a new [place] tube which was confirmed on x-ray.  During this time, patient continued to have episodes of hypotension and received a third red box of blood.  Ventilator had to be increasingly titrated up due to hypoxemia, patient is currently on 100% FiO2 and 16 of PEEP\" - Per Dr. Kelly's Note    12/29 - Overnight right radial arterial line was switched to right axillary arterial line due to discordance between cuff and line pressures. Bronchoscopy w/ BAL was also performed with ET tube repositioning as well as suction of copious bloody secretions in airways. Flagyl added alongside C3. Currently receiving CRRT with gentle PUF of 150 cc/hr, 2.25 L removed. On APV CMV with improved ABG of 7.255/33.4/72. EGD performed with successful esophageal banding x 6.   "

## 2023-12-29 NOTE — CARE PLAN
Problem: Safety - Medical Restraint  Goal: Remains free of injury from restraints (Restraint for Interference with Medical Device)  Outcome: Progressing  Goal: Free from restraint(s) (Restraint for Interference with Medical Device)  Outcome: Not Progressing     Problem: Pain - Standard  Goal: Alleviation of pain or a reduction in pain to the patient’s comfort goal  Outcome: Progressing        SR 80-90's no ectopy

## 2023-12-29 NOTE — ASSESSMENT & PLAN NOTE
Presenting with acute on chronic liver failure. Hx of alcohol abuse w/ resultant cirrhosis. Likely having worsening function secondary to alcoholic hepatitis vs shock liver. Maddrey's discriminant function score at 120 points.    -Solu-Medrol 40 mg daily   -Avoid hepatoxic agents  -Hepatically dose medications  -Obtaining ANCA, RADHA, Anti-smooth muscle Abs, Iron panel/ferritin  -Monitor/trends

## 2023-12-29 NOTE — ASSESSMENT & PLAN NOTE
While being transferred, patient suffered cardiac arrest requiring 5-10 minutes CPR with ROSC achieved.    -Normothermia protocol  -Correct acid/base abnormalities  -Maintain normoglycemia  -Neuroprognostication 48-72 hours post-arrest

## 2023-12-29 NOTE — ASSESSMENT & PLAN NOTE
Due to hemorrhagic shock and cardiac arrest. Cr improved from earlier this hospitalization but still well above baseline.  - Continue CRRT  - Electrolytes

## 2023-12-29 NOTE — PROGRESS NOTES
"Critical Care Progress Note    Date of admission  12/28/2023    Chief Complaint  28 y.o. male admitted 12/28/2023 with hemorrhagic shock from acute upper GI bleed, cardiac arrest, acute hypoxic respiratory failure, DB, alcoholic cirrhosis    Hospital Course  \"HPI: 28y M hx of alcohol abuse with 1pint of hard alcohol a day. Was recently admitted for upper GI bleed for variceal hemorrhage 12/18 requiring 7 bands. After leaving hospital patient started turning yellow per wife he has been trying to cut down on drinking but using gummies. He then last night started to vomiting bright red blood filling up an entire barf bag with bright red blood. She couldn't get him to come to hospital but tonight she was able to. He arrived and was being transferred here from outside hospital when he suffered a cardiac arrest requiring 5-10 minutes of CPR and was diverted to [facility] where he was intubated and had central line placed. He was obtained ROCS and transferred here for higher level of care. He is currently intubated so hx is taken from [facility] at bedside. He was able to be weaned off pressor on arrival here. He found to have db with hyperkalemia to 7, Hg was 10.8 and plt of 92, INR of 3.2, bili of 8.2, cr of 2.45, bicarb of 11. He was given a total of 12 units PRBC,4 FFP, 1 PLT prior to arrival with TXA. GI has been consulted. He will be DNR I okay.\" -Per Dr. Patiño's Note         \"12/28/2023  Prior to the start of my shift, patient had received a total of 12 units of PRBCs, 4 units of FFP, 2 units of platelets, 1 unit of cryo.  Patient was noted to have ongoing melena and became hypotensive and tachycardic.  I ordered a red box and it was transfused via the [place] rapid infuser.  As patient became more stable, EGD was started by GI at bedside.  They were unable to visualize the esophagus and placed a [place] tube.    Patient continued to be hypotensive and second red box was started.  After placement of the " "[place] tube, patient had increasing peak pressures and was desaturating.  I attempted to pass the bronchoscope through his ET tube with concern for hypoxemia due to aspiration of blood.  I was unable to pass the bronchoscope and noted that the ET tube was completely occluded, presumably from the [place] tube.  We had to give the patient propofol and paralytics in order to open his mouth for further inspection.  When we were able to open his mouth we noted that the esophageal portion of the [place] tube was inflated in his posterior pharynx causing compression of his tongue and ET tube.  The tube was deflated both in the esophageal and gastric balloons.  Bronchoscopy was completed at this time showing some minimal blood, mostly in the right mainstem which was suctioned out.  I was unable to fully inspect his airways due to the acuity of his illness.  After deflation of the [place] tube, patient continued to have ongoing bleeding from his mouth and rectum.  I advanced the [place] tube down and attempted to inflate the gastric balloon.  At this point,  from GI presented to bedside to help and pulled out the [place] tube due to concern for the gastric balloon malfunctioning.  He placed a new [place] tube which was confirmed on x-ray.  During this time, patient continued to have episodes of hypotension and received a third red box of blood.  Ventilator had to be increasingly titrated up due to hypoxemia, patient is currently on 100% FiO2 and 16 of PEEP\" - Per Dr. Kelly's Note    Interval Problem Update  Reviewed last 24 hour events:  Overnight right radial arterial line was switched to right axillary arterial line due to discordance between cuff and line pressures. Bronchoscopy w/ BAL was also performed with ET tube repositioning as well as suction of copious bloody secretions in airways. Currently receiving CRRT with gentle PUF of 150 cc/hr, 2.25 L removed. On APV CMV with improved ABG of " 7.255/33.4/72. Plan for possible EGD in afternoon for repeat attempt at banding.    Review of Systems  Review of Systems   Unable to perform ROS: Intubated        Vital Signs for last 24 hours   Temp:  [35.4 °C (95.7 °F)-36.8 °C (98.2 °F)] 36.3 °C (97.3 °F)  Pulse:  [] 78  Resp:  [2-51] 44  BP: ()/() 97/45  SpO2:  [59 %-100 %] 95 %    Hemodynamic parameters for last 24 hours       Respiratory Information for the last 24 hours  Vent Mode: APVCMV  Rate (breaths/min): 34  Vt Target (mL): 450  PEEP/CPAP: 16  MAP: 25  Control VTE (exp VT): 469    Physical Exam   Physical Exam  Constitutional:       General: He is not in acute distress.     Appearance: He is obese. He is toxic-appearing.      Interventions: He is intubated.   HENT:      Head: Normocephalic and atraumatic.      Nose: Nose normal.      Comments: [place] Tube in place     Mouth/Throat:      Mouth: Mucous membranes are moist.   Eyes:      General: Scleral icterus present.      Comments: Right pupil minimally reactive, left pupil non-reactive   Cardiovascular:      Rate and Rhythm: Normal rate and regular rhythm.      Heart sounds: No murmur heard.     No friction rub. No gallop.   Pulmonary:      Effort: He is intubated.      Breath sounds: Examination of the right-lower field reveals decreased breath sounds. Examination of the left-lower field reveals decreased breath sounds. Decreased breath sounds, rhonchi and rales present.   Abdominal:      General: There is distension.      Palpations: There is no mass.      Tenderness: There is no abdominal tenderness. There is no guarding or rebound.      Hernia: No hernia is present.   Musculoskeletal:      Right lower leg: No edema.      Left lower leg: No edema.   Skin:     General: Skin is dry.      Capillary Refill: Capillary refill takes 2 to 3 seconds.   Neurological:      Comments: Pharmacologically sedated, weak cough, no gag or corneal reflex         Medications  Current  Facility-Administered Medications   Medication Dose Route Frequency Provider Last Rate Last Admin    metroNIDAZOLE (Flagyl) IVPB 500 mg  500 mg Intravenous Q12HRS Hector Patiño M.D.   Stopped at 12/29/23 0327    magnesium sulfate IVPB premix 2 g  2 g Intravenous Once Daniella Kelly D.O. 25 mL/hr at 12/29/23 0921 2 g at 12/29/23 0921    octreotide (SandoSTATIN) 1,250 mcg in  mL Infusion  50 mcg/hr Intravenous Continuous Hector Patiño M.D. 10 mL/hr at 12/29/23 0746 50 mcg/hr at 12/29/23 0746    pantoprazole (Protonix) 80 mg in  mL infusion  8 mg/hr Intravenous Continuous Hector Patiño M.D. 25 mL/hr at 12/29/23 0720 8 mg/hr at 12/29/23 0720    Respiratory Therapy Consult   Nebulization Continuous RT Hector Patiño M.D.        senna-docusate (Pericolace Or Senokot S) 8.6-50 MG per tablet 2 Tablet  2 Tablet Enteral Tube BID Hector Patiño M.D.        And    polyethylene glycol/lytes (Miralax) Packet 1 Packet  1 Packet Enteral Tube QDAY PRN Hector Patiño M.D.        And    magnesium hydroxide (Milk Of Magnesia) suspension 30 mL  30 mL Enteral Tube QDAY PRN Hector Patiño M.D.        And    bisacodyl (Dulcolax) suppository 10 mg  10 mg Rectal QDAY PRN Hector Patiño M.D.        lidocaine (Xylocaine) 1 % injection 2 mL  2 mL Tracheal Tube Q30 MIN PRN Hector Patiño M.D.        cefTRIAXone (Rocephin) syringe 1,000 mg  1,000 mg Intravenous Q24HRS Hector Patiño M.D.   1,000 mg at 12/29/23 0508    thiamine (B-1) 250 mg in dextrose 5% 100 mL IVPB  250 mg Intravenous Q8HRS Hector Patiño M.D. 200 mL/hr at 12/29/23 0610 250 mg at 12/29/23 0610    insulin regular (HumuLIN R,NovoLIN R) injection  2-9 Units Subcutaneous Q6HRS Hector Patiño M.D.        And    dextrose 10 % BOLUS 25 g  25 g Intravenous Q15 MIN PRN Hector Patiño M.D.        [Held by provider] lactulose 10 GM/15ML solution 300 mL  300 mL Rectal Q6HRS Sylvia Chavez M.D.        fentaNYL (Sublimaze) injection  100 mcg  100 mcg Intravenous Q15 MIN PRN Sylvia Chavez M.D.   100 mcg at 12/28/23 0546    And    fentaNYL (Sublimaze) injection 200 mcg  200 mcg Intravenous Q15 MIN PRN Sylvia Chavez M.D.        vasopressin (Vasostrict) 20 Units in  mL Infusion  0.03 Units/min Intravenous Continuous Sylvia Chavez M.D. 9 mL/hr at 12/29/23 0746 0.03 Units/min at 12/29/23 0746    norepinephrine (Levophed) 8 mg in 250 mL NS infusion (premix)  0-1 mcg/kg/min (Ideal) Intravenous Continuous RAMIN Cobian.O. 59.3 mL/hr at 12/29/23 0948 0.42 mcg/kg/min at 12/29/23 0948    EPINEPHrine (Adrenalin) infusion 4 mg/250 mL (premix)  0-0.5 mcg/kg/min (Ideal) Intravenous Continuous RAMIN Cobian.O.   Stopped at 12/28/23 0901    methylPREDNISolone (SOLU-MEDROL) 40 MG injection 40 mg  40 mg Intravenous DAILY Daniella Kelly D.O.   40 mg at 12/29/23 0507    midazolam (Versed) premix 125 mg/125 mL infusion  0-10 mg/hr Intravenous Continuous RAMIN Cobian.O. 5 mL/hr at 12/29/23 0655 5 mg/hr at 12/29/23 0655    fentaNYL (SUBLIMAZE) 50 mcg/mL in 50mL (Continuous Infusion)   Intravenous Continuous RAMIN Cobian.O. 4 mL/hr at 12/29/23 0913 200 mcg/hr at 12/29/23 0913       Fluids    Intake/Output Summary (Last 24 hours) at 12/29/2023 1005  Last data filed at 12/29/2023 0950  Gross per 24 hour   Intake 4859.97 ml   Output 3355 ml   Net 1504.97 ml       Laboratory  Recent Labs     12/28/23  1317 12/28/23  1725 12/29/23  0509   ISTATAPH 7.237* 7.284* 7.249*   ISTATAPCO2 36.7 31.6 34.0   ISTATAPO2 56* 71 74   ISTATATCO2 17* 16* 16*   WHPYZNF4DLY 83* 92* 92*   ISTATARTHCO3 15.6* 15.0* 14.9*   ISTATARTBE -11* -11* -11*   ISTATTEMP 35.3 C 36.2 C 36.6 C   ISTATFIO2 100 100 100   ISTATSPEC Arterial Arterial Arterial   ISTATAPHTC 7.260* 7.295* 7.255*   VUNMPSBB2DV 49* 67 72     Recent Labs     12/28/23  0154   CPKTOTAL 424*     Recent Labs     12/28/23  1717 12/29/23  0035 12/29/23  0505   SODIUM 142 139 138   POTASSIUM  5.0 5.4 5.8*   CHLORIDE 96 97 99   CO2 13* 14* 13*   BUN 33* 27* 23*   CREATININE 3.38* 2.73* 2.52*   MAGNESIUM  --   --  1.8   PHOSPHORUS 9.1* 6.8* 6.2*   CALCIUM 9.0 8.4* 8.5     Recent Labs     12/28/23  0550 12/28/23  1105 12/28/23  1315 12/28/23  1717 12/29/23  0035 12/29/23  0505   ALTSGPT 419* 298*  --   --   --  674*   ASTSGOT 1964* 1323*  --   --   --  2964*   ALKPHOSPHAT 112* 67  --   --   --  124*   TBILIRUBIN 9.5* 5.8*  --   --   --  11.6*   GLUCOSE 156* 127*   < > 125* 108* 80    < > = values in this interval not displayed.     Recent Labs     12/28/23  0030 12/28/23  0149 12/28/23  0550 12/28/23  1105 12/28/23  1315 12/29/23  0218 12/29/23  0505 12/29/23  0835   WBC 34.7*   < > 29.8* 19.4*   < > 27.5* 27.1* 28.1*   NEUTSPOLYS 63.70  --   --   --   --   --  75.90*  --    LYMPHOCYTES 7.30*  --   --   --   --   --  5.80*  --    MONOCYTES 4.00  --   --   --   --   --  3.30  --    EOSINOPHILS 1.60  --   --   --   --   --  0.00  --    BASOPHILS 0.80  --   --   --   --   --  0.00  --    ASTSGOT  --   --  1964* 1323*  --   --  2964*  --    ALTSGPT  --   --  419* 298*  --   --  674*  --    ALKPHOSPHAT  --   --  112* 67  --   --  124*  --    TBILIRUBIN  --   --  9.5* 5.8*  --   --  11.6*  --     < > = values in this interval not displayed.     Recent Labs     12/28/23  1105 12/28/23  1315 12/28/23  1717 12/29/23  0218 12/29/23  0505 12/29/23  0835   RBC 3.28* 3.69*   < > 3.84* 3.79* 3.88*   HEMOGLOBIN 10.1* 11.5*   < > 11.8* 11.7* 12.1*   HEMATOCRIT 30.9* 33.7*   < > 34.6* 34.3* 35.8*   PLATELETCT 120* 100*   < > 97* 102* 104*   PROTHROMBTM 21.8* 19.5*  --   --  24.6*  --    INR 1.87* 1.63*  --   --  2.19*  --     < > = values in this interval not displayed.       Imaging  X-Ray:  I have personally reviewed the images and compared with prior images.  EKG:  I have personally reviewed the images and compared with prior images.    Assessment/Plan  * Esophageal varices with hemorrhage (HCC)- (present on  admission)  Assessment & Plan  Recent hospitalization with 7 bands placed 12/18. Presenting with recurrent hemorrhage with attempted banding on 12/28/2023. [place] tube therefore placed on 12/28. Total amount of Blood products received thus far: PRBC 24, FFP 16, platelet 4, cryo 2.    -Continue Protonix gtt, Octreotide gtt  -Not a candidate for TIPS given MELD score of 40  -Continue prophylactic C3  -Plan for possible EGD with repeat attempt at banding in afternoon, appreciate GI support    Cardiac arrest (HCC)  Assessment & Plan  While being transferred, patient suffered cardiac arrest requiring 5-10 minutes CPR with ROSC achieved.    -Normothermia protocol  -Correct acid/base abnormalities  -Maintain normoglycemia  -Neuroprognostication 48-72 hours post-arrest    Encephalopathy, anoxic, hepatic, toxic  Assessment & Plan  Multifactorial, Patient did suffer cardiac arrest and bouts of hypoxemia 12/28.  Significantly elevated ammonia in 600s on presentation as well in the setting of cirrhosis.  Also significantly acidotic/uremic in the setting DB/ATN and lactic acidosis.    -Continue CRRT  -Will consider rectal lactulose once [place] tube can be discontinued  -Continue to monitor mentation/neurological status    Hemorrhagic shock (HCC)  Assessment & Plan  Secondary to acute upper GI hemorrhage in the setting of significant variceal bleed.    -Continue pressor support, titrate as appropriate with goal of MAP > 65 mmHg  -Serially trend H&H  -Transfuse if Hgb < 7   -Monitor hemodynamics    Acute hypoxemic respiratory failure (HCC)  Assessment & Plan  Likely secondary to aspiration from upper GI hemorrhage and volume overload. Intubated 12/28.    -Continuous pulse oximetry  -RT protocol  -HOB >30 degrees  -GI prophylaxis  -Daily CXRs  -Daily/PRN ABGs  -Titrate ventilator settings as appropriate  -Continue empiric C3  -Continue CRRT    Lactic acidosis  Assessment & Plan  Secondary to hemorrhagic shock as well as  recent cardiac arrest.    -Trend until normalization  -Continue CRRT    Liver failure (HCC)  Assessment & Plan  Presenting with acute on chronic liver failure. Hx of alcohol abuse w/ resultant cirrhosis. Likely having worsening function secondary to alcoholic hepatitis vs shock liver. [place] discriminant function score at 120 points.    -Solu-Medrol 40 mg daily   -Avoid hepatoxic agents  -Hepatically dose medications  -Monitor/trends    Acute renal failure with tubular necrosis (HCC)  Assessment & Plan  Secondary to hemorrhagic shock and cardiac arrest. Nephology consulted, appreciate support    -Pending repeat UA  -Continue CRRT   -Serial BMP's, electrolytes    Alcohol dependence (HCC)- (present on admission)  Assessment & Plan  History of significant alcohol abuse with resultant cirrhosis and     -Continue IV thiamine  -Will supplement w/ MV, B9, B12 once orals can be initiated  -Counseling when clinically appropriate     Cirrhosis, Likely alcoholic- (present on admission)  Assessment & Plan  Cirrhosis secondary to significant alcohol abuse. MELD score of 40. Presenting with resultant variceal hemorrhage.     -Not a candidate for TIPS at this time  -Avoid hepatoxic agents  -Hepatically adjust medications         VTE:  Contraindicated  Ulcer: PPI  Lines: Central Line  Ongoing indication addressed, Arterial Line  Ongoing indication addressed, and Cottrell Catheter  Ongoing indication addressed    I have performed a physical exam and reviewed and updated ROS and Plan today (12/29/2023). In review of yesterday's note (12/28/2023), there are no changes except as documented above.     Discussed patient condition and risk of morbidity and/or mortality with RN, RT, Pharmacy, and Patient

## 2023-12-29 NOTE — ASSESSMENT & PLAN NOTE
2/2 variceal hemorrhage, Hgb has been stable in the 11's.  - BP support with norepi and vasopressin  - Monitor Hgb

## 2023-12-29 NOTE — ASSESSMENT & PLAN NOTE
Patient did undergo cardiac arrest and has had episodes of hypoxemia.  He also has an elevated ammonia level with known cirrhosis.  Furthermore, patient has been acidotic and uremic.  - CRRT for uremia and acidosis  - Rectal lactulose  - Strict n.p.o., patient cannot receive po lactulose at this time  - Will continue to monitor mental status. Currently sedated and intubated

## 2023-12-29 NOTE — PROGRESS NOTES
This RN requested orders regarding [place] tube. Dr. Hi to bedside, discussed tube, no need to check pressures, just measure output.     Orders received to follow BP cuff to titrate pressors.     0600- Dr. Chavez notified of AST 2964    0604- Dr. Patiño notified of potassium 5.8. [place], dialysis RN notified of potassium as well. Nephrologist, Dr. Parker, switched to 2k baths for CRRT

## 2023-12-29 NOTE — PROCEDURES
Date of Procedure:  12/28/2023    Title of Procedure:  Diagnostic and therapeutic flexible fiberoptic bronchoscopy with bronchoalveolar lavage    Indication for Procedure:   Atelectasis    Post-procedure Diagnoses:    1.  Normal endobronchial anatomy  2.  No endobronchial tumor identified  3.  Endotracheal tube was too high in the trachea and was repositioned into proper position  4.  Copious amounts of juicy bloody secretions in the distal trachea, left mainstem bronchus, right mainstem bronchus, left lower lobe bronchi and right lower lobe bronchi    Narrative:    A time out was performed identifying the correct patient, correct procedure and correct location prior to this procedure.    The patient was sedated, intubated and ventilated at the time of this procedure.  The flexible fiberoptic bronchoscope was inserted through the lumen of the endotracheal tube and advanced into the distal trachea without difficulty.  The airways were examined to the subsegmental bronchus level bilaterally.    The endobronchial anatomy was normal.  No tumor was identified.    The endotracheal tube was too high in the trachea and I positioned it in the proper position.  There was copious amounts of juicy bloody secretions seen in the distal trachea, left mainstem bronchus, right mainstem bronchus, left lower lobe bronchi and right lower lobe bronchi.  I therapeutically suctioned these secretions.    Bronchoalveolar lavage was carried out in the basilar segments of the right lower lobe bronchi using the standard technique with good fluid return.    Bronchoalveolar lavage fluid from the right lower lobe is submitted to the laboratory for gram stain, culture and sensitivity.    The patient tolerated the procedure quite nicely.  No complications were apparent.  The heart rate and rhythm, blood pressure and oxygenation saturation were continuously monitored.    This was an EMERGENT procedure.  It was medically necessary to perform this  procedure emergently to prevent life threatening deterioration.      Anatoliy Hi MD  Pulmonary and Critical Care Medicine

## 2023-12-29 NOTE — ASSESSMENT & PLAN NOTE
Acute on chronic, acute component likely secondary to alcoholic hepatitis versus shock liver  - [facility] discriminant function =119 points on 12/31  - Solu-Medrol 40 mg daily for alcoholic hepatitis  - Hepatic dosing for medications

## 2023-12-29 NOTE — PROGRESS NOTES
Pulmonary and Critical Care Medicine Progress Note    This gentleman is critically ill on full mechanical ventilatory support with hemorrhagic shock due to a massive upper gastrointestinal hemorrhage.    Physical examination:    Sinus rhythm with heart rate of 91  Respiratory rate 34  Few crackles in his lungs bilaterally  Abdomen is distended  He is sedated on midazolam and fentanyl    He is on full mechanical ventilatory support on volume control ventilation.  Norepinephrine is being titrated to keep mean arterial pressure greater than 65  He is on octreotide and pantoprazole infusions    His last hemoglobin was 11.5 with a platelet count of 88,000  He is on CRRT  His last serum bicarbonate was 13 with a BUN of 33 and a creatinine of 3.38  Blood glucose is 125  His lactic acid remains elevated at 14.4  Arterial blood gas reveals a pH of 7.30 pCO2 of 31 and pO2 of 67    Diagnoses:    Hemorrhagic shock due to upper gastrointestinal hemorrhage from esophageal varices - [place] tube is in place.  The esophageal balloon is not inflated.  The gastric balloon has 500 mL of air in it.  There is no significant blood coming from the gastric or esophageal suction ports.    Acute hypoxemic respiratory failure  Acute blood loss anemia  Alcoholic cirrhosis  Acute kidney injury on CRRT    I have assessed and reassessed his respiratory status with ventilator adjustments, airway mechanics, ventilator waveforms, blood pressure, hemodynamics, cardiovascular status with titration of norepinephrine, serial determinations of his hemoglobin and response to transfusion of blood products.  This gentleman is at high risk for worsening respiratory, cardiovascular, hematologic, renal, hepatic and CNS system dysfunction.    The patient remains critically ill.  Critical care time = 75 minutes in directly providing and coordinating critical care and extensive data review.  No time overlap and excludes procedures.    The time spent is in  addition to the time spent by Dr. Patiño and Dr. Kelly earlier today.      Anatoliy Hi MD  Pulmonary and Critical Care Medicine

## 2023-12-29 NOTE — ASSESSMENT & PLAN NOTE
Secondary to acute upper GI hemorrhage in the setting of significant variceal bleed.    -Continue pressor support, titrate as appropriate with goal of MAP > 65 mmHg  -Serially trend H&H  -Transfuse if Hgb < 7   -Monitor hemodynamics

## 2023-12-30 PROBLEM — D68.9 COAGULOPATHY (HCC): Status: ACTIVE | Noted: 2023-01-01

## 2023-12-30 NOTE — PROGRESS NOTES
"[facility] -  PROGRESS NOTE               Author: Erick Vick M.D. Date & Time: 12/30/2023  9:55 AM     HPI:  [id number]  M w hx of HTN, alcohol abuse and cirrhosis is transferred to [facility] for higher level of care after vomiting blood and suffering a cardiac arrest. Pt is intubated and history is from the chart. Pt was recently admitted at [facility] for variceal bleeding on 12/18. He was discharged and started vomiting blood again the night before. He went to [facility] where he was intubated after suffering a cardiac arrest. ROSC was achieved and pt was transferred to [facility] for higher level of care. He was given multiple blood products, pRBC, platelets, FFP, with almost 45u of pRBC received. Cr noted to be 2.45 on arrival, (was 0.6 on 12/18/23) and kaylen to 2.8. He is not making any urine. Case was discussed with family at bedside.      DAILY NEPHROLOGY SUMMARY:  12/28: Consult done  12/29: NAEO, clinically unchanged, tolerated CRRT well overnight; Future MIL at bedside  12/30: Conintues on CRRT, sedation withdrawn about 6 hrs without response    REVIEW OF SYSTEMS:    10 point ROS not reviewed second to critcal illness      CURRENT MEDICATIONS:   Reviewed from admission to present day    VS:  BP 92/48   Pulse 84   Temp 36.4 °C (97.5 °F) (Bladder)   Resp (!) 34   Ht 1.803 m (5' 10.98\")   Wt (!) 157 kg (346 lb 2 oz)   SpO2 100%   BMI 48.30 kg/m²     Physical Exam  Nursing note reviewed.   Constitutional:       Appearance: He is ill-appearing.      Interventions: He is sedated and intubated.   Eyes:      General: No scleral icterus.  Cardiovascular:      Comments: +Anasarca  Pulmonary:      Effort: Pulmonary effort is normal. He is intubated.   Abdominal:      General: There is no distension.   Musculoskeletal:         General: No deformity.   Skin:     Findings: No rash.       Fluids:  In: 4231 [I.V.:3605.4]  Out: 7438     LABS:  Recent Labs     12/29/23  1100 12/29/23  1705 " 12/30/23  0500   SODIUM 138 137 133*   POTASSIUM 5.6* 5.5 5.5   CHLORIDE 99 100 97   CO2 14* 14* 14*   GLUCOSE 62* 96 81   BUN 19 17 13   CREATININE 2.21* 2.01* 1.72*   CALCIUM 8.1* 8.1* 7.8*         IMAGING:   All imaging reviewed from admission to present day    IMPRESSION:  # DB  - Etiology likely 2/2 ATN  - Oligoanuric  - CRRT since 12/28  # Hyperkalemia  - Due to DB/Cardiac arrest  - CRRT ongoing  # Sepsis  - Etiology unclear  - On pressors  # Acute respiratory failure with hypoxia  - Intubated 12/28  # Acidosis  - +anion gap, due to ischemia   # Acute liver injury  - Ischemia induced    PLAN:  - Continue CRRT on 2K bath now due to hyperK  - Monitor I/O  - Renal panel Q6 to monitor K+ and PO4  - Titrate UF as tolerated  - Maintain MAP > 60  - May consider iHD once more stable  - Discussed plan and current condition with family at bedside as well as RN

## 2023-12-30 NOTE — CARE PLAN
Problem: Safety - Medical Restraint  Goal: Remains free of injury from restraints (Restraint for Interference with Medical Device)  Outcome: Progressing     Problem: Pain - Standard  Goal: Alleviation of pain or a reduction in pain to the patient’s comfort goal  Outcome: Progressing   The patient is Unstable - High likelihood or risk of patient condition declining or worsening         Progress made toward(s) clinical / shift goals:  EGD with several bands placed, [place] tube removed. No bloody stools or emesis    Patient is not progressing towards the following goals: Remains on two pressors and CRRT. Pt remains anuric

## 2023-12-30 NOTE — PROGRESS NOTES
[facility] Progress Note      Continuous CVVHD treatment ordered by Dr. Vick. Pt tolerating tx well without any issues.     Total UF (1900 - 0600): 3815 mL. See flow sheet for details.     Circuit assessed for clotting with increased balance chamber pressures noted. Blood returned without difficulty, cartridge changed and tx restarted.     In-service given to PENNY Santiago RN. Please call Dialysis Room [phone] for questions or machine troubleshooting.

## 2023-12-30 NOTE — CARE PLAN
Problem: Ventilation  Goal: Ability to achieve and maintain unassisted ventilation or tolerate decreased levels of ventilator support  Description: Target End Date:  4 days     Document on Vent flowsheet    1.  Support and monitor invasive and noninvasive mechanical ventilation  2.  Monitor ventilator weaning response  3.  Perform ventilator associated pneumonia prevention interventions  4.  Manage ventilation therapy by monitoring diagnostic test results  Outcome: Not Met     Ventilator Daily Summary    Vent Day #2    Airway: 7.5@27    Ventilator settings: 34/450/+16/100%    Weaning trials: No    Respiratory Procedures: No    Plan: Continue current ventilator settings and wean mechanical ventilation as tolerated per physician orders.

## 2023-12-30 NOTE — ASSESSMENT & PLAN NOTE
Likely secondary to poor synthetic function in the setting of cirrhosis. No hx of anti-platelet/anticoagulation use. S/p cryo x 2 and FFP x 16    -Providing additional unit of FFP for total of 17 units  -Monitor/trend INR

## 2023-12-30 NOTE — PROGRESS NOTES
Patient: : Mk Virk  MRN: [id number]    Decompensated cirrhosis, GI bleeding, PHG, Evophageal bleeding with gastric ulcer, duodenal ulcer and necrosis of mucosa of upper GI.     Esophageal bleeding seems controlled after yesterday's scope.   No more hematemesis since yestgerday.   Relatively stable Hgb.   Discussed current situation with the patient's significant other at bedside in ICU.     GI team will keep monitoring the patient.    Labs:  Recent Labs     12/29/23  1705 12/29/23  2115 12/30/23  0500   WBC 26.9* 27.6* 26.8*   RBC 3.80* 3.75* 3.74*   HEMOGLOBIN 11.6* 11.8* 11.7*   HEMATOCRIT 34.6* 34.7* 34.7*   MCV 91.1 92.5 92.8   MCH 30.5 31.5 31.3   MCHC 33.5 34.0 33.7   RDW 55.8* 58.4* 59.1*   PLATELETCT 101* 95* 101*   MPV 10.5 10.1 10.6     Recent Labs     12/28/23  0154 12/28/23  0550 12/29/23  1100 12/29/23  1705 12/30/23  0500   SODIUM  --    < > 138 137 133*   POTASSIUM  --    < > 5.6* 5.5 5.5   CHLORIDE  --    < > 99 100 97   CO2  --    < > 14* 14* 14*   GLUCOSE  --    < > 62* 96 81   BUN  --    < > 19 17 13   CPKTOTAL 424*  --   --   --   --     < > = values in this interval not displayed.     Recent Labs     12/28/23  1315 12/29/23  0505 12/30/23  0500   INR 1.63* 2.19* 2.84*       Recent Labs     12/28/23  0149 12/28/23  0550 12/28/23  0750 12/28/23  1105 12/28/23  1315 12/29/23  0505 12/29/23  2320 12/30/23  0500 12/30/23  0754   ASTSGOT  --    < >  --  1323*  --  2964* 3283* 3384*  --    ALTSGPT  --    < >  --  298*  --  674* 744* 823*  --    TBILIRUBIN  --    < >  --  5.8*  --  11.6* 12.8* 12.6*  --    IBILIRUBIN  --   --   --   --   --   --  3.3*  --   --    DBILIRUBIN  --   --   --   --   --   --  9.5*  --   --    ALKPHOSPHAT  --    < >  --  67  --  124* 249* 271*  --    GLOBULIN  --    < >  --  1.7*  --  2.0  --  2.1  --    INR  --   --  3.92* 1.87* 1.63* 2.19*  --  2.84*  --    AMMONIA 648*  --  184*  --   --   --   --   --  181*    < > = values in this interval not displayed.

## 2023-12-30 NOTE — PROGRESS NOTES
[facility] Progress Note     CRRT/CVVHD treatment ordered by Dr Carlisle   Order and labs reviewed w/ MD and up to date.  Treatment consent signed and verified.     UF from 0700 to 1900: 2991 ml      @1835, CRRT Treatment paused to change Cartridge. Increasing pressures and clotting found.  @1903, CRRT Treatment resumed.  Dialyzer checked for clots and air bubbles; clear w/ no issues.  Machine Pressures are still stable.  Patient is sedated and intubated prior to arrival.  Patient is tolerating treatment with 100 ml/hr net removal.  SBP in 's   CVC access pressures WNL and on current ordered BFR.  Q6hr Labs to monitor CMP, BMP and Phosphorous. Nephrologist to be notified of results.     In service given to CARMELA Hernandez RN.  Full-Inservice given to PCN. PCN receptive and all questions about CRRT were answered at this time.        Call [facility]/On Call at [phone] for further assistance    See CRRT flowsheet by ICU RN for CRRT I & O details     Please contact nephrologist/dialysis RN for any questions/assistance related to treatment as needed (dialysis on call RN/nephrologist contact info on paper flowsheet)

## 2023-12-30 NOTE — CARE PLAN
The patient is Unstable - High likelihood or risk of patient condition declining or worsening    Shift Goals  Clinical Goals: Maintain hemodynamic stability, wean pressors, SAT, CRRT, maintain comfort, manage pain PRN  Patient Goals: LARRY  Family Goals: Get better    Progress made toward(s) clinical / shift goals:    Problem: Safety - Medical Restraint  Goal: Remains free of injury from restraints (Restraint for Interference with Medical Device)  Outcome: Progressing     Problem: Pain - Standard  Goal: Alleviation of pain or a reduction in pain to the patient’s comfort goal  Outcome: Progressing       Patient is not progressing towards the following goals:

## 2023-12-30 NOTE — PROGRESS NOTES
"Critical Care Progress Note    Date of admission  12/28/2023    Chief Complaint  28 y.o. male admitted 12/28/2023 with hemorrhagic shock from acute upper GI bleed, cardiac arrest, acute hypoxic respiratory failure, DB, alcoholic cirrhosis    Hospital Course  \"HPI: 28y M hx of alcohol abuse with 1pint of hard alcohol a day. Was recently admitted for upper GI bleed for variceal hemorrhage 12/18 requiring 7 bands. After leaving hospital patient started turning yellow per wife he has been trying to cut down on drinking but using gummies. He then last night started to vomiting bright red blood filling up an entire barf bag with bright red blood. She couldn't get him to come to hospital but tonight she was able to. He arrived and was being transferred here from outside hospital when he suffered a cardiac arrest requiring 5-10 minutes of CPR and was diverted to [facility] where he was intubated and had central line placed. He was obtained ROCS and transferred here for higher level of care. He is currently intubated so hx is taken from [facility] at bedside. He was able to be weaned off pressor on arrival here. He found to have db with hyperkalemia to 7, Hg was 10.8 and plt of 92, INR of 3.2, bili of 8.2, cr of 2.45, bicarb of 11. He was given a total of 12 units PRBC,4 FFP, 1 PLT prior to arrival with TXA. GI has been consulted. He will be DNR I okay.\" -Per Dr. Patiño's Note         \"12/28/2023  Prior to the start of my shift, patient had received a total of 12 units of PRBCs, 4 units of FFP, 2 units of platelets, 1 unit of cryo.  Patient was noted to have ongoing melena and became hypotensive and tachycardic.  I ordered a red box and it was transfused via the [place] rapid infuser.  As patient became more stable, EGD was started by GI at bedside.  They were unable to visualize the esophagus and placed a [place] tube.    Patient continued to be hypotensive and second red box was started.  After placement of the " "[place] tube, patient had increasing peak pressures and was desaturating.  I attempted to pass the bronchoscope through his ET tube with concern for hypoxemia due to aspiration of blood.  I was unable to pass the bronchoscope and noted that the ET tube was completely occluded, presumably from the [place] tube.  We had to give the patient propofol and paralytics in order to open his mouth for further inspection.  When we were able to open his mouth we noted that the esophageal portion of the [place] tube was inflated in his posterior pharynx causing compression of his tongue and ET tube.  The tube was deflated both in the esophageal and gastric balloons.  Bronchoscopy was completed at this time showing some minimal blood, mostly in the right mainstem which was suctioned out.  I was unable to fully inspect his airways due to the acuity of his illness.  After deflation of the [place] tube, patient continued to have ongoing bleeding from his mouth and rectum.  I advanced the [place] tube down and attempted to inflate the gastric balloon.  At this point,  from GI presented to bedside to help and pulled out the [place] tube due to concern for the gastric balloon malfunctioning.  He placed a new [place] tube which was confirmed on x-ray.  During this time, patient continued to have episodes of hypotension and received a third red box of blood.  Ventilator had to be increasingly titrated up due to hypoxemia, patient is currently on 100% FiO2 and 16 of PEEP\" - Per Dr. Kelly's Note    12/29 - Overnight right radial arterial line was switched to right axillary arterial line due to discordance between cuff and line pressures. Bronchoscopy w/ BAL was also performed with ET tube repositioning as well as suction of copious bloody secretions in airways. Flagyl added alongside C3. Currently receiving CRRT with gentle PUF of 150 cc/hr, 2.25 L removed. On APV CMV with improved ABG of 7.255/33.4/72. EGD " performed with successful esophageal banding x 6.     Interval Problem Update  Reviewed last 24 hour events:  No acute events overnight, no evidence of recurrent bleed. Off fentanyl/versed ggt. Requiring levophed and vasopressin for pressor support. Continuing CRRT per nephro recs. Worsening transaminitis, ordered RADHA, ANCA, Anti- smooth muscle AB, Iron studies/ferritin. INR 2.84, providing FFP.     Review of Systems  Review of Systems   Unable to perform ROS: Intubated        Vital Signs for last 24 hours   Temp:  [35.7 °C (96.3 °F)-36.6 °C (97.9 °F)] 36.4 °C (97.5 °F)  Pulse:  [76-87] 84  Resp:  [30-54] 34  BP: ()/(37-81) 92/48  SpO2:  [89 %-100 %] 100 %    Hemodynamic parameters for last 24 hours       Respiratory Information for the last 24 hours  Vent Mode: APVCMV  Rate (breaths/min): 34  Vt Target (mL): 450  PEEP/CPAP: 14  MAP: 20  Control VTE (exp VT): 452    Physical Exam   Physical Exam  Constitutional:       General: He is not in acute distress.     Appearance: He is obese. He is toxic-appearing.      Interventions: He is intubated.   HENT:      Head: Normocephalic and atraumatic.      Nose: Nose normal.      Mouth/Throat:      Mouth: Mucous membranes are moist.   Eyes:      General: Scleral icterus present.      Comments: Non-reactive pupils bilaterally   Cardiovascular:      Rate and Rhythm: Normal rate and regular rhythm.      Heart sounds: No murmur heard.     No friction rub. No gallop.   Pulmonary:      Effort: He is intubated.      Breath sounds: Examination of the right-lower field reveals decreased breath sounds. Examination of the left-lower field reveals decreased breath sounds. Decreased breath sounds, rhonchi and rales present.   Abdominal:      General: There is distension.      Palpations: There is no mass.      Tenderness: There is no abdominal tenderness. There is no guarding or rebound.      Hernia: No hernia is present.   Musculoskeletal:      Right lower leg: No edema.      Left  lower leg: No edema.   Skin:     General: Skin is dry.      Capillary Refill: Capillary refill takes 2 to 3 seconds.   Neurological:      Comments: Off of pharmalogical sedation, no cough, gag, corneal reflex         Medications  Current Facility-Administered Medications   Medication Dose Route Frequency Provider Last Rate Last Admin    norepinephrine (Levophed) infusion 32 mg/500 mL (premix)  0-1 mcg/kg/min (Ideal) Intravenous Continuous Daniella Kelly D.O. 49.4 mL/hr at 12/30/23 0932 0.7 mcg/kg/min at 12/30/23 0932    metroNIDAZOLE (Flagyl) IVPB 500 mg  500 mg Intravenous Q12HRS Hector Patiño M.D.   Stopped at 12/30/23 0625    dextrose 10% infusion   Intravenous Continuous Mane Dominguez D.O. 50 mL/hr at 12/30/23 0800 Rate Verify at 12/30/23 0800    octreotide (SandoSTATIN) 1,250 mcg in  mL Infusion  50 mcg/hr Intravenous Continuous Hector Patiño M.D. 10 mL/hr at 12/30/23 0800 50 mcg/hr at 12/30/23 0800    pantoprazole (Protonix) 80 mg in  mL infusion  8 mg/hr Intravenous Continuous Hector Patiño M.D. 25 mL/hr at 12/30/23 0800 8 mg/hr at 12/30/23 0800    Respiratory Therapy Consult   Nebulization Continuous RT Hector Patiño M.D.        lidocaine (Xylocaine) 1 % injection 2 mL  2 mL Tracheal Tube Q30 MIN PRN Hector Patiño M.D.        cefTRIAXone (Rocephin) syringe 1,000 mg  1,000 mg Intravenous Q24HRS Hector Patiño M.D.   1,000 mg at 12/30/23 0611    thiamine (B-1) 250 mg in dextrose 5% 100 mL IVPB  250 mg Intravenous Q8HRS Hector Patiño M.D. 200 mL/hr at 12/30/23 0815 250 mg at 12/30/23 0815    insulin regular (HumuLIN R,NovoLIN R) injection  2-9 Units Subcutaneous Q6HRS Hector Patiño M.D.        And    dextrose 10 % BOLUS 25 g  25 g Intravenous Q15 MIN PRN Hector Patiño M.D. 999 mL/hr at 12/29/23 1149 25 g at 12/29/23 1149    lactulose 10 GM/15ML solution 300 mL  300 mL Rectal Q6HRS Sylvia Chavez M.D.        fentaNYL (Sublimaze) injection 100 mcg  100 mcg  Intravenous Q15 MIN PRN Sylvia Chavez M.D.   100 mcg at 12/28/23 0546    And    fentaNYL (Sublimaze) injection 200 mcg  200 mcg Intravenous Q15 MIN PRN Sylvia Chavez M.D.        vasopressin (Vasostrict) 20 Units in  mL Infusion  0.03 Units/min Intravenous Continuous Sylvia Chavez M.D. 9 mL/hr at 12/30/23 1030 0.03 Units/min at 12/30/23 1030    EPINEPHrine (Adrenalin) infusion 4 mg/250 mL (premix)  0-0.5 mcg/kg/min (Ideal) Intravenous Continuous Daniella Kelly D.O.   Stopped at 12/28/23 0901    methylPREDNISolone (SOLU-MEDROL) 40 MG injection 40 mg  40 mg Intravenous DAILY Daniella Kelly D.O.   40 mg at 12/30/23 0526    midazolam (Versed) premix 125 mg/125 mL infusion  0-10 mg/hr Intravenous Continuous Daniella Kelly D.O.   Stopped at 12/30/23 0247    fentaNYL (SUBLIMAZE) 50 mcg/mL in 50mL (Continuous Infusion)   Intravenous Continuous Daniella Kelly D.O.   Stopped at 12/30/23 0246       Fluids    Intake/Output Summary (Last 24 hours) at 12/30/2023 1033  Last data filed at 12/30/2023 0957  Gross per 24 hour   Intake 5128.36 ml   Output 7234 ml   Net -2105.64 ml       Laboratory  Recent Labs     12/29/23  1707 12/29/23  2312 12/30/23  0506   ISTATAPH 7.237* 7.224* 7.252*   ISTATAPCO2 36.1 36.6 33.4   ISTATAPO2 78 85 89*   ISTATATCO2 16* 16* 16*   YZNYAQP0ZMM 93 94 95   ISTATARTHCO3 15.4* 15.1* 14.7*   ISTATARTBE -11* -12* -11*   ISTATTEMP 35.9 C 36.8 C 36.2 C   ISTATFIO2 100 100 100   ISTATSPEC Arterial Arterial Arterial   ISTATAPHTC 7.252* 7.227* 7.263*   URDFATMU4QY 72 84 85     Recent Labs     12/28/23  0154   CPKTOTAL 424*     Recent Labs     12/29/23  1100 12/29/23  1705 12/30/23  0500   SODIUM 138 137 133*   POTASSIUM 5.6* 5.5 5.5   CHLORIDE 99 100 97   CO2 14* 14* 14*   BUN 19 17 13   CREATININE 2.21* 2.01* 1.72*   MAGNESIUM 2.2 2.1 2.0   PHOSPHORUS 5.6* 6.0* 6.1*   CALCIUM 8.1* 8.1* 7.8*     Recent Labs     12/29/23  0505 12/29/23  1100 12/29/23  1705 12/29/23  4650  12/30/23  0500   ALTSGPT 674*  --   --  744* 823*   ASTSGOT 2964*  --   --  3283* 3384*   ALKPHOSPHAT 124*  --   --  249* 271*   TBILIRUBIN 11.6*  --   --  12.8* 12.6*   DBILIRUBIN  --   --   --  9.5*  --    GLUCOSE 80 62* 96  --  81     Recent Labs     12/28/23  0030 12/28/23  0149 12/29/23  0505 12/29/23  0835 12/29/23  1705 12/29/23 2115 12/29/23  2320 12/30/23  0500   WBC 34.7*   < > 27.1*   < > 26.9* 27.6*  --  26.8*   NEUTSPOLYS 63.70  --  75.90*  --   --   --   --  88.80*   LYMPHOCYTES 7.30*  --  5.80*  --   --   --   --  3.50*   MONOCYTES 4.00  --  3.30  --   --   --   --  3.40   EOSINOPHILS 1.60  --  0.00  --   --   --   --  0.00   BASOPHILS 0.80  --  0.00  --   --   --   --  0.90   ASTSGOT  --    < > 2964*  --   --   --  3283* 3384*   ALTSGPT  --    < > 674*  --   --   --  744* 823*   ALKPHOSPHAT  --    < > 124*  --   --   --  249* 271*   TBILIRUBIN  --    < > 11.6*  --   --   --  12.8* 12.6*    < > = values in this interval not displayed.     Recent Labs     12/28/23  1315 12/28/23  1717 12/29/23  0505 12/29/23  0835 12/29/23  1705 12/29/23  2115 12/30/23  0500 12/30/23  0754   RBC 3.69*   < > 3.79*   < > 3.80* 3.75* 3.74*  --    HEMOGLOBIN 11.5*   < > 11.7*   < > 11.6* 11.8* 11.7*  --    HEMATOCRIT 33.7*   < > 34.3*   < > 34.6* 34.7* 34.7*  --    PLATELETCT 100*   < > 102*   < > 101* 95* 101*  --    PROTHROMBTM 19.5*  --  24.6*  --   --   --  30.3*  --    INR 1.63*  --  2.19*  --   --   --  2.84*  --    IRON  --   --   --   --   --   --   --  52   FERRITIN  --   --   --   --   --   --   --  1682.0*   TOTIRONBC  --   --   --   --   --   --   --  183*    < > = values in this interval not displayed.       Imaging  X-Ray:  I have personally reviewed the images and compared with prior images.  EKG:  I have personally reviewed the images and compared with prior images.    Assessment/Plan  * Esophageal varices with hemorrhage (HCC)- (present on admission)  Assessment & Plan  Recent hospitalization with 7 bands  placed 12/18. Presenting with recurrent hemorrhage with attempted banding on 12/28/2023, unsuccessful. [place] tube therefore placed on 12/28. Total amount of Blood products received thus far: PRBC 24, FFP 17, platelet 4, cryo 2. Successful EDG w/ esophageal banding x 6 12/29.     -GI following, appreciate support  -Continue Protonix gtt, Octreotide gtt  -Not a candidate for TIPS given MELD score of 40  -Continue prophylactic C3, flagyl    Coagulopathy (HCC)  Assessment & Plan  Likely secondary to poor synthetic function in the setting of cirrhosis. No hx of anti-platelet/anticoagulation use. S/p cryo x 2 and FFP x 16    -Providing additional unit of FFP for total of 17 units  -Monitor/trend INR    Cardiac arrest (HCC)  Assessment & Plan  While being transferred, patient suffered cardiac arrest requiring 5-10 minutes CPR with ROSC achieved.    -Normothermia protocol  -Correct acid/base abnormalities  -Maintain normoglycemia  -Neuroprognostication 48-72 hours post-arrest    Encephalopathy, anoxic, hepatic, toxic  Assessment & Plan  Multifactorial, Patient did suffer cardiac arrest and bouts of hypoxemia 12/28.  Significantly elevated ammonia in 600s on presentation as well in the setting of cirrhosis.  Also significantly acidotic/uremic in the setting DB/ATN and lactic acidosis.    -Continue CRRT  -Reinitiating rectal Lactulose  -Continue to monitor mentation/neurological status    Hemorrhagic shock (HCC)  Assessment & Plan  Secondary to acute upper GI hemorrhage in the setting of significant variceal bleed.    -Continue pressor support, titrate as appropriate with goal of MAP > 65 mmHg  -Serially trend H&H  -Transfuse if Hgb < 7   -Monitor hemodynamics    Acute hypoxemic respiratory failure (HCC)  Assessment & Plan  Likely secondary to aspiration from upper GI hemorrhage and volume overload. Intubated 12/28.    -Continuous pulse oximetry  -RT protocol  -HOB >30 degrees  -GI prophylaxis  -Daily CXRs  -Daily/PRN  ABGs  -Titrate ventilator settings as appropriate  -Continue empiric C3, flagyl   -Continue CRRT    Lactic acidosis  Assessment & Plan  Secondary to hemorrhagic shock as well as recent cardiac arrest.    -Trend until normalization  -Continue CRRT    Liver failure (HCC)  Assessment & Plan  Presenting with acute on chronic liver failure. Hx of alcohol abuse w/ resultant cirrhosis. Likely having worsening function secondary to alcoholic hepatitis vs shock liver. Maddrey's discriminant function score at 120 points.    -Solu-Medrol 40 mg daily   -Avoid hepatoxic agents  -Hepatically dose medications  -Obtaining ANCA, RADHA, Anti-smooth muscle Abs, Iron panel/ferritin  -Monitor/trends    Acute renal failure with tubular necrosis (HCC)  Assessment & Plan  Secondary to hemorrhagic shock and cardiac arrest. Nephology consulted, appreciate support    -Pending repeat UA, however anuric  -Continue CRRT   -Serial BMP's, electrolytes    Alcohol dependence (HCC)- (present on admission)  Assessment & Plan  History of significant alcohol abuse with resultant cirrhosis and     -Continue IV thiamine  -Will supplement w/ MV, B9, B12 once orals can be initiated  -Counseling when clinically appropriate     Cirrhosis, Likely alcoholic- (present on admission)  Assessment & Plan  Cirrhosis likely secondary to significant alcohol abuse. MELD score of 40. Presenting with resultant variceal hemorrhage.     -Not a candidate for TIPS at this time  -Avoid hepatoxic agents  -Hepatically adjust medications         VTE:  Contraindicated  Ulcer: PPI  Lines: Central Line  Ongoing indication addressed, Arterial Line  Ongoing indication addressed, and Cottrell Catheter  Ongoing indication addressed    I have performed a physical exam and reviewed and updated ROS and Plan today (12/30/2023). In review of yesterday's note (12/29/2023), there are no changes except as documented above.     Discussed patient condition and risk of morbidity and/or mortality with  RN, RT, Pharmacy, and Patient

## 2023-12-31 PROBLEM — R74.01 TRANSAMINITIS: Status: ACTIVE | Noted: 2023-01-01

## 2023-12-31 NOTE — PROGRESS NOTES
"[facility] -  PROGRESS NOTE               Author: Erick Vick M.D. Date & Time: 12/31/2023  9:27 AM     HPI:  28Y  M w hx of HTN, alcohol abuse and cirrhosis is transferred to [facility] for higher level of care after vomiting blood and suffering a cardiac arrest. Pt is intubated and history is from the chart. Pt was recently admitted at [facility] for variceal bleeding on 12/18. He was discharged and started vomiting blood again the night before. He went to [facility] where he was intubated after suffering a cardiac arrest. ROSC was achieved and pt was transferred to [facility] for higher level of care. He was given multiple blood products, pRBC, platelets, FFP, with almost 45u of pRBC received. Cr noted to be 2.45 on arrival, (was 0.6 on 12/18/23) and kaylen to 2.8. He is not making any urine. Case was discussed with family at bedside.      DAILY NEPHROLOGY SUMMARY:  12/28: Consult done  12/29: NAEO, clinically unchanged, tolerated CRRT well overnight; Future MIL at bedside  12/30: Conintues on CRRT, sedation withdrawn about 6 hrs without response  12/31: Minimal responsiveness overnight, increase lacate/LFT    REVIEW OF SYSTEMS:    10 point ROS not reviewed second to critcal illness      CURRENT MEDICATIONS:   Reviewed from admission to present day    VS:  /45   Pulse 95   Temp 37.4 °C (99.3 °F)   Resp (!) 43   Ht 1.803 m (5' 10.98\")   Wt (!) 154 kg (338 lb 13.6 oz)   SpO2 96%   BMI 47.28 kg/m²     Physical Exam  Nursing note reviewed.   Constitutional:       Appearance: He is ill-appearing.      Interventions: He is sedated and intubated.   Eyes:      General: No scleral icterus.  Cardiovascular:      Comments: +Anasarca  Pulmonary:      Effort: Pulmonary effort is normal. He is intubated.   Abdominal:      General: There is no distension.   Musculoskeletal:         General: No deformity.   Skin:     Findings: No rash.       Fluids:  In: 4605.7 [I.V.:3064.8; Blood:370; " Dialysis:571]  Out: 9195     LABS:  Recent Labs     12/30/23  1712 12/31/23  0000 12/31/23  0518   SODIUM 135 134* 133*   POTASSIUM 5.6* 5.9* 5.9*   CHLORIDE 97 98 97   CO2 16* 16* 17*   GLUCOSE 89 69 46*   BUN 11 10 10   CREATININE 1.44* 1.45* 1.54*   CALCIUM 7.6* 7.4* 7.3*         IMAGING:   All imaging reviewed from admission to present day    IMPRESSION:  # DB  - Etiology likely 2/2 ATN  - Oligoanuric  - CRRT since 12/28  # Hyperkalemia  - Due to DB/Cardiac arrest  - CRRT ongoing  # Sepsis  - Etiology unclear  - On pressors  # Acute respiratory failure with hypoxia  - Intubated 12/28  # Acidosis  - +anion gap, due to ischemia   # Acute liver injury  - Ischemia induced  #Encephalopthy  -? Ischmeic vs hepatic    PLAN:  - Continue CRRT on 2K bath but no net UF  - Monitor I/O  - Renal panel Q6 to monitor K+ and PO4  - Maintain MAP > 60  - May consider iHD once more stable  - Discussed plan and current condition with family at bedside as well as RN

## 2023-12-31 NOTE — PROGRESS NOTES
This HD RN went at pt's bedside at 0655 with supplies of 2K bath and cartridges. Still on 2 K bath due to K of 5.9. Prior to change of cartridge due to impending clot at the dialyzer, cindy Sung RN and SARAH Marina had to clean up pt first. Bowel movement was loose, dark red in color. Rinse back at 0738. Restarted treatment at 0829 with new cartridge used. Net UF of 4483 ml. Dr Vick came in at bedside and spoke with the fiancee. Ordered no UF off just even out. Right IJ temporary catheter was patent with good BFR x 2. Gave in service to BEATRIS Marina. See flow sheet for details.

## 2023-12-31 NOTE — PROGRESS NOTES
Arterial line going flat in the 180+ range. Attempted troubleshooting line, recalibrating, power flushing, repositioning arm. Line is incredibly slow to flush in all positions, no longer returns blood on aspiration. Notified Dr. Hi, orders to remove line.

## 2023-12-31 NOTE — CARE PLAN
Problem: Safety - Medical Restraint  Goal: Remains free of injury from restraints (Restraint for Interference with Medical Device)  Outcome: Progressing     Problem: Pain - Standard  Goal: Alleviation of pain or a reduction in pain to the patient’s comfort goal  Outcome: Progressing     Problem: Skin Integrity  Goal: Skin integrity is maintained or improved  Outcome: Progressing   The patient is Unstable - High likelihood or risk of patient condition declining or worsening    Shift Goals  Clinical Goals: Maintain hemodynamic stability, wean pressors, SAT, CRRT, maintain comfort, manage pain PRN  Patient Goals: LARRY  Family Goals: Get better    Progress made toward(s) clinical / shift goals:  off sedation and not overbreathing ventilator. No obvious signs of bleeding    Patient is not progressing towards the following goals: Pupils non reactive, does not withdraw to pain and no cough corneal or gag reflex

## 2023-12-31 NOTE — ASSESSMENT & PLAN NOTE
2/2 Alcoholic hepatitis +/- shock liver. AST>ALT. Both values increasing as of 12/31  - Treatment per EtOH hepatitis problem  - CTM

## 2023-12-31 NOTE — PROGRESS NOTES
[facility] Progress Note        Rounding visit:     CRRT(CVVHD) continuous treatment in progress, day 2 (date start 12/28/23 @ 1828)  Nephrologist orders reviewed and verified on machine settings.  Patient sedated and intubated with 2 vasopressors for BP support (see MAR) per ICU parameters  Right IJ non tunneled CVC access patent, meets BFR, clean dry intact dressings in place.  No access/arterial access pressure reading detected on monitor and also bottomed out access pressure pod, access pressure pod filled and reset successfully, PCN educated.  Circuit/dialyzer assessed, flushed ciurcuit with 100 NS no signs of clotting, access pressures within parameters, no cartridge change required at this time.    Net UF from 9502-9980 : 4269 mL (total UF of: 4440 mL - 171 mL NS prime)    Therapy fluid: 2K/ Na 140/Ca 3/HCO3 35  TFR at 4.7 L/hr  BFR at 250 mL /hr  UF goal: 200 mL/hr on top of patient intake as tolerated    Blood pressures soft, vasopressors titrated by PCN per MAR orders  Right IJ non tunneled CVC access patent, meets BFR, clean dry intact, blood lines visible and secure.  Circuit/dialyzer assessed, no signs of clotting at this time, access pressures within parameters     Modality in service provided to primary care nurse CARMELA Hernandez RN regarding therapy, treatment goals, alarms, troubleshooting and demonstration on emergency return of blood. Hourly rounding checklist provided.     Please monitor Renal Function Panel every 6 hours to check K and Phos while on CRRT. Pls notify nephrologist of results,  Please flush lines with NS only as needed for clot prevention.     If needed to return blood or after blood returned, pls flush HD catheter CVC with saline then locked with Heparin 1000 units/mL per designated amount in each wing then clamp and cap with red caps aseptically. Notify dialysis and nephrologist.     Please call for any questions/assistance related to treatment if needed (dialysis on  call/nephrologist contact info on flowsheet)         See CRRT flowsheet by ICU RN for CRRT  I & O details

## 2023-12-31 NOTE — CARE PLAN
The patient is Unstable - High likelihood or risk of patient condition declining or worsening    Shift Goals  Clinical Goals: Maintain hemodyanmic stability, manage CRRT, wean pressors, monitor neuro, educate family  Patient Goals: LARRY  Family Goals: Get better, rest    Progress made toward(s) clinical / shift goals:    Problem: Safety - Medical Restraint  Goal: Free from restraint(s) (Restraint for Interference with Medical Device)  Outcome: Progressing     Problem: Pain - Standard  Goal: Alleviation of pain or a reduction in pain to the patient’s comfort goal  Outcome: Progressing     Problem: Hemodynamics  Goal: Patient's hemodynamics, fluid balance and neurologic status will be stable or improve  Outcome: Progressing     Problem: Mechanical Ventilation  Goal: Safe management of artificial airway and ventilation  Outcome: Progressing       Patient is not progressing towards the following goals:

## 2023-12-31 NOTE — CARE PLAN
Problem: Ventilation  Goal: Ability to achieve and maintain unassisted ventilation or tolerate decreased levels of ventilator support  Description: Target End Date:  4 days     Document on Vent flowsheet    1.  Support and monitor invasive and noninvasive mechanical ventilation  2.  Monitor ventilator weaning response  3.  Perform ventilator associated pneumonia prevention interventions  4.  Manage ventilation therapy by monitoring diagnostic test results  Outcome: Not Met     Ventilator Daily Summary    Vent Day #3    Airway: 7.5@27    Ventilator settings: 34/450/+14/90%    Weaning trials: No    Respiratory Procedures: No    Plan: Continue current ventilator settings and wean mechanical ventilation as tolerated per physician orders.

## 2023-12-31 NOTE — WOUND TEAM
[facility]  Inpatient Services  Initial Wound and Skin Care Evaluation    Admission Date: 12/28/2023     Last order of IP CONSULT TO WOUND CARE was found on 12/31/2023 from Hospital Encounter on 12/27/2023     HPI, PMH, SH: Reviewed    Past Surgical History:   Procedure Laterality Date    LA UPPER GI ENDOSCOPY,DIAGNOSIS  12/29/2023    Procedure: GASTROSCOPY;  Surgeon: Ijeoma Encarnacion M.D.;  Location: SURGERY SAME DAY [facility];  Service: Gastroenterology    LA UPPER GI ENDOSCOPY,LIGAT VARIX  12/29/2023    Procedure: GASTROSCOPY, WITH BANDING;  Surgeon: Ijeoma Encarnacion M.D.;  Location: SURGERY SAME DAY [facility];  Service: Gastroenterology    LA UPPER GI ENDOSCOPY,DIAGNOSIS N/A 12/28/2023    Procedure: GASTROSCOPY;  Surgeon: Ijeoma Encarnacion M.D.;  Location: SURGERY SAME DAY [facility];  Service: Gastroenterology    LA UPPER GI ENDOSCOPY,LIGAT VARIX N/A 12/28/2023    Procedure: GASTROSCOPY, WITH VARICEAL BANDING;  Surgeon: Ijeoma Encarnacion M.D.;  Location: SURGERY SAME DAY [facility];  Service: Gastroenterology    LA UPPER GI ENDOSCOPY,DIAGNOSIS N/A 12/15/2023    Procedure: GASTROSCOPY;  Surgeon: Benson Lott M.D.;  Location: SURGERY SAME DAY [facility];  Service: Gastroenterology    LA UPPER GI ENDOSCOPY,LIGAT VARIX N/A 12/15/2023    Procedure: GASTROSCOPY, WITH VARICEAL BANDING;  Surgeon: Benson Lott M.D.;  Location: SURGERY SAME DAY [facility];  Service: Gastroenterology     Social History     Tobacco Use    Smoking status: Never     Passive exposure: Never    Smokeless tobacco: Never   Substance Use Topics    Alcohol use: Yes     Alcohol/week: 7.2 oz     Types: 12 Shots of liquor per week     Chief Complaint   Patient presents with    Upper GI Bleed    Other     Post cardiac arrest     Diagnosis: Upper GI bleed [K92.2]    Unit where seen by Wound Team: T610/00     WOUND CONSULT RELATED TO:  Sacrum, Bilateral I.T.s, upper lip and right ear    WOUND TEAM PLAN OF CARE - Frequency of Follow-up:   Nursing to  follow dressing orders written for wound care. Contact wound team if area fails to progress, deteriorates or with any questions/concerns if something comes up before next scheduled follow up (See below as to whether wound is following and frequency of wound follow up)   Weekly - Sacrum/I.T.s and R Ear    WOUND HISTORY:   Pt is a 28yr old obese male admitted with cardiac arrest. Pt has history of ETOH abuse and alcoholic cirrhosis, GI Bleed and respiratory failure. Pt was recently admitted and had 7 bands placed due to variceal hemorrhage. Pt now readmitted and intubated after being transferred from [facility] with cardiac arrest requiring CPR. Pt currently has multi system organ failure involving kidneys, liver, GI Tract, Respiratory system and skin. Wound team was consulted to assess areas of discoloration.       WOUND ASSESSMENT/LDA      Wound 12/30/23 Pressure Injury Ear Right (Active)   Wound Image     12/31/23 1050   Site Assessment Purple    Periwound Assessment Intact    Margins Attached edges    Closure None    Drainage Amount None    Treatments Cleansed;Offloading    Wound Cleansing Soap and Water    Periwound Protectant Not Applicable    Dressing Status Open to Air    NEXT Weekly Photo (Inpatient Only) 01/07/24    Wound Team Following Weekly    WOUND NURSE ONLY - Pressure Injury Stage DTPI    Wound Length (cm) 0.7 cm    Wound Width (cm) 1.5 cm    Wound Surface Area (cm^2) 1.05 cm^2    WOUND NURSE ONLY - Time Spent with Patient (mins) 60        Wound 12/31/23 Full Thickness Wound Bilateral sacrum, coccyx, Tuan IT SKIN FAILURE (Active)   Wound Image      12/31/23 1050   Site Assessment Purple;Red    Periwound Assessment Pink;Purple    Margins Attached edges    Closure None    Drainage Amount None    Treatments Cleansed;Offloading    Wound Cleansing Soap and Water    Dressing Status Clean;Dry;Intact    Dressing Changed New    Dressing Cleansing/Solutions Not Applicable    Dressing Options Offloading  Dressing - Sacral    Dressing Change/Treatment Frequency Every 72 hrs, and As Needed    NEXT Dressing Change/Treatment Date 01/03/24    NEXT Weekly Photo (Inpatient Only) 01/07/24    Wound Team Following Weekly    Non-staged Wound Description Full thickness    Wound Length (cm) 12 cm    Wound Width (cm) 8 cm    Wound Surface Area (cm^2) 96 cm^2                        Vascular:    SOLANGE:   No results found.    Lab Values:    Lab Results   Component Value Date/Time    WBC 27.2 (H) 12/31/2023 05:18 AM    RBC 3.58 (L) 12/31/2023 05:18 AM    HEMOGLOBIN 11.3 (L) 12/31/2023 05:18 AM    HEMATOCRIT 33.9 (L) 12/31/2023 05:18 AM    HBA1C 8.2 (H) 12/15/2023 01:45 AM         Culture Results show:  No results found for this or any previous visit (from the past 720 hour(s)).    Pain Level/Medicated:  None, Tolerated without pain medication       INTERVENTIONS BY WOUND TEAM:  Chart and images reviewed. Discussed with bedside RN. All areas of concern (based on picture review, LDA review and discussion with bedside RN) have been thoroughly assessed. Documentation of areas based on significant findings. This RN in to assess patient. Performed standard wound care which includes appropriate positioning, dressing removal and non-selective debridement. Pictures and measurements obtained weekly if/when required.    Wound:  Right Ear sDTI  Preparation for Dressing removal: Open to air  Cleansed/Non-selectively Debrided with:  N/A  Lizet wound: Cleansed with N/A, Prepped with Open to Air  Primary Dressing:  Left open to air   Secondary (Outer) Dressing: Z kimmy pillow     Wound:  Sacrococcygeal area extending around the rectum to bilateral I.T.s - Skin failure  Preparation for Dressing removal: Removed without difficulty  Cleansed/Non-selectively Debrided with:  No rinse foam soap and Moist warm washcloth  Lizet wound: Cleansed with No rinse foam soap and Moist warm washcloth, Prepped with Barrier paste  Primary Dressing:  sacral offloading  dressing higher on sacrum  Secondary (Outer) Dressing: BMS system placed in attempt to divert blood from rectum.     Area assessed:  Upper and lower lips  Discoloration noted, does not appear to be pressure related.     Area Assessed: Scattered discoloration to bilateral flanks and abdomen as well as thighs  Pt has bruising/discoloration scattered to anterior surface of the body.    Area assessed:  Bilateral heels  Intact, heel offloading dressings and pillows applied.    Advanced Wound Care Discharge Planning  Number of Clinicians necessary to complete wound care: 1  Is patient requiring IV pain medications for dressing changes:  No   Length of time for dressing change 30 min. (This does not include chart review, pre-medication time, set up, clean up or time spent charting.)    Interdisciplinary consultation: Patient, Bedside RN (Laina), Ragini VELASCO (Wound RN) and Unit Leadership Danielle Tejeda .  Pressure injury and staging reviewed with Ragini VELASCO (Wound RN).    EVALUATION / RATIONALE FOR TREATMENT:     Date:  12/31/23  Wound Status:  Initial evaluation    Pt with small sDTI to the right ear which should resolve with proper offloading. Pt has large amount of discoloration from sacrum to bilateral I.T.s appears to be related to skin failure in combination with active GI Bleed likely resulting in vascular pooling to the area. Pt has waffle overlay and wedges in use for turns. Cottrell in place and BMS was placed this visit.           Goals: Steady decrease in wound area and depth weekly.    NURSING PLAN OF CARE ORDERS:  RN Prevention Protocol  Rectal tube care: See Rectal Tube Care orders    NUTRITION RECOMMENDATIONS   Wound Team Recommendations:  N/A    DIET ORDERS (From admission to next 24h)       Start     Ordered    12/29/23 9081  Diet NPO Restrict to: Strict  AT MIDNIGHT      Comments: No [employer]. Sips of clear liquids to take medications up to 2 hours prior to procedure.   Question:  Diet NPO Restrict to:  Answer:   Strict    12/29/23 0905                    PREVENTATIVE INTERVENTIONS:    Q shift Jeffrey - performed per nursing policy  Q shift pressure point assessments - performed per nursing policy    Surface/Positioning  ICU Low Airloss - Currently in Place  Reposition q 2 hours - Currently in Place  TAPs Turning system - Currently in Place  Waffle overlay  - Currently in Place  Z Kimmy Pillow - Currently in Place    Offloading/Redistribution  Sacral offloading dressing (Silicone dressing) - Applied this Visit  Heel offloading dressing (Silicone dressing) - Applied this Visit      Respiratory  Anchorfast - Currently in Place    Containment/Moisture Prevention    Dri-kimmy pad - Currently in Place  Rectal tube or BMS - Applied this Visit  Cottrell Catheter - Currently in Place  Barrier paste - Currently in Place    Anticipated discharge plans:  TBD        Vac Discharge Needs:  Vac Discharge plan is purely a recommendation from wound team and not a requirement for discharge unless otherwise stated by physician.  Not Applicable Pt not on a wound vac

## 2023-12-31 NOTE — PROGRESS NOTES
Pt BP consistently 80/40s despite vasopressor titration. CRRT cycler programmed hourly removal decreased to 250. Dr. Kelly notified.

## 2023-12-31 NOTE — PROGRESS NOTES
Gastroenterology Progress Note               Author:  WENDY Buckley Date & Time Created: 12/31/2023 11:46 AM       Patient ID:  Name:             Mk Virk    YOB: 1995  Age:                 28 y.o.  male  MRN:               [id number]        Medical Decision Making, by Problem:  Active Hospital Problems    Diagnosis     Transaminitis [R74.01]     Coagulopathy (HCC) [D68.9]     Cardiac arrest (HCC) [I46.9]     Esophageal varices with hemorrhage (HCC) [I85.01]     Acute renal failure with tubular necrosis (HCC) [N17.0]     Liver failure (HCC) [K72.90]     Lactic acidosis [E87.20]     Acute hypoxemic respiratory failure (HCC) [J96.01]     Hemorrhagic shock (HCC) [R57.8]     Encephalopathy, anoxic, hepatic, toxic [G93.40]     Atelectasis [J98.11]     Leukocytosis [D72.829]     Cirrhosis, Likely alcoholic [K74.60]     Alcohol dependence (HCC) [F10.20]            Presenting Chief Complaint:  Upper GI bleeding, hematemesis, melena.       Interval History:  12/31/2023: Patient seen at bedside.  Remains intubated.  Patient with melena this morning per nursing.  Hemoglobin remains 11.3, BUN 10.        Hospital Medications:  Current Facility-Administered Medications   Medication Dose Frequency Provider Last Rate Last Admin    norepinephrine (Levophed) infusion 32 mg/500 mL (premix)  0-1 mcg/kg/min (Ideal) Continuous Daniella Kelly D.O. 60 mL/hr at 12/31/23 1101 0.85 mcg/kg/min at 12/31/23 1101    metroNIDAZOLE (Flagyl) IVPB 500 mg  500 mg Q12HRS Hector Patiño M.D.   Stopped at 12/31/23 0649    dextrose 10% infusion   Continuous Cristela Weaver M.D. 75 mL/hr at 12/31/23 0900 Rate Verify at 12/31/23 0900    octreotide (SandoSTATIN) 1,250 mcg in  mL Infusion  50 mcg/hr Continuous Hector Patiño M.D. 10 mL/hr at 12/31/23 0840 50 mcg/hr at 12/31/23 0840    pantoprazole (Protonix) 80 mg in  mL infusion  8 mg/hr Continuous Hector Patiño M.D. 25 mL/hr at 12/31/23 1045  "8 mg/hr at 12/31/23 1047    Respiratory Therapy Consult   Continuous RT Hector Patiño M.D.        lidocaine (Xylocaine) 1 % injection 2 mL  2 mL Q30 MIN PRN Hector Patiño M.D.        cefTRIAXone (Rocephin) syringe 1,000 mg  1,000 mg Q24HRS Hector Patiño M.D.   1,000 mg at 12/31/23 0736    insulin regular (HumuLIN R,NovoLIN R) injection  2-9 Units Q6HRS Hector Patiño M.D.        And    dextrose 10 % BOLUS 25 g  25 g Q15 MIN PRN Hector Patiño M.D. 999 mL/hr at 12/31/23 0620 25 g at 12/31/23 0620    lactulose 10 GM/15ML solution 300 mL  300 mL Q6HRS Sylvia Chavez M.D.   300 mL at 12/30/23 1800    fentaNYL (Sublimaze) injection 100 mcg  100 mcg Q15 MIN PRN Sylvia Chavez M.D.   100 mcg at 12/28/23 0546    And    fentaNYL (Sublimaze) injection 200 mcg  200 mcg Q15 MIN PRN Sylvia Chavez M.D.        vasopressin (Vasostrict) 20 Units in  mL Infusion  0.03 Units/min Continuous Sylvia Chavez M.D. 9 mL/hr at 12/31/23 0842 0.03 Units/min at 12/31/23 0842    methylPREDNISolone (SOLU-MEDROL) 40 MG injection 40 mg  40 mg DAILY Daniella Kelly D.O.   40 mg at 12/31/23 0535   Last reviewed on 12/28/2023  1:27 AM by Alecia Adams PhT       Review of Systems:  Review of Systems   Unable to perform ROS: Critical illness         Vital signs:  Weight/BMI: Body mass index is 47.28 kg/m².  BP (!) 87/40   Pulse 90   Temp 37.1 °C (98.8 °F)   Resp (!) 21   Ht 1.803 m (5' 10.98\")   Wt (!) 154 kg (338 lb 13.6 oz)   SpO2 97%   Vitals:    12/31/23 1030 12/31/23 1045 12/31/23 1100 12/31/23 1115   BP: 91/45  (!) 81/37 (!) 87/40   Pulse: 92 87 86 90   Resp: (!) 30 (!) 25 (!) 26 (!) 21   Temp:   37.1 °C (98.8 °F)    TempSrc:       SpO2: 95% 95% 96% 97%   Weight:       Height:         Oxygen Therapy:  Pulse Oximetry: 97 %, FiO2%: 70 %, O2 Delivery Device: Ventilator    Intake/Output Summary (Last 24 hours) at 12/31/2023 1146  Last data filed at 12/31/2023 1100  Gross per 24 hour   Intake 4135.13 ml   Output " 8302 ml   Net -4166.87 ml         Physical Exam:  Physical Exam  Vitals and nursing note reviewed.   Constitutional:       Appearance: He is morbidly obese. He is ill-appearing.      Interventions: He is sedated and intubated.   HENT:      Head: Normocephalic.      Nose: Nose normal. No congestion.      Mouth/Throat:      Mouth: Mucous membranes are moist.      Pharynx: Oropharynx is clear. No oropharyngeal exudate.   Eyes:      General: Scleral icterus present.      Extraocular Movements: Extraocular movements intact.      Conjunctiva/sclera: Conjunctivae normal.   Cardiovascular:      Rate and Rhythm: Normal rate and regular rhythm.      Pulses: Normal pulses.      Heart sounds: Normal heart sounds.   Pulmonary:      Effort: Pulmonary effort is normal. No respiratory distress. He is intubated.      Breath sounds: Normal breath sounds.      Comments: On mechanical ventilator via ETT  Transient upper airway sounds  Abdominal:      General: Abdomen is flat. Bowel sounds are normal. There is no distension.      Palpations: Abdomen is soft.      Tenderness: There is no abdominal tenderness. There is no guarding.   Genitourinary:     Comments: Melena  Musculoskeletal:      Right lower leg: No edema.   Skin:     General: Skin is warm and dry.      Capillary Refill: Capillary refill takes less than 2 seconds.      Coloration: Skin is jaundiced.   Neurological:      Comments: Unable to assess due to sedation and intubation   Psychiatric:      Comments: Unable to assess due to sedation and intubation             Labs:  Recent Labs     12/30/23 1712 12/31/23  0000 12/31/23  0518   SODIUM 135 134* 133*   POTASSIUM 5.6* 5.9* 5.9*   CHLORIDE 97 98 97   CO2 16* 16* 17*   BUN 11 10 10   CREATININE 1.44* 1.45* 1.54*   MAGNESIUM 2.0 2.2 2.2   PHOSPHORUS 5.5* 6.1* 6.4*   CALCIUM 7.6* 7.4* 7.3*     Recent Labs     12/29/23  2320 12/30/23  0500 12/30/23  1132 12/30/23  1712 12/31/23  0000 12/31/23  0518   ALTSGPT 744* 823*  --   --    --  806*   ASTSGOT 3283* 3384*  --   --   --  3509*   ALKPHOSPHAT 249* 271*  --   --   --  348*   TBILIRUBIN 12.8* 12.6*  --   --   --  12.3*   DBILIRUBIN 9.5*  --   --   --   --   --    GLUCOSE  --  81   < > 89 69 46*    < > = values in this interval not displayed.     Recent Labs     12/29/23  0505 12/29/23  0835 12/29/23  2320 12/30/23  0500 12/30/23  1712 12/31/23  0518   WBC 27.1*   < >  --  26.8* 27.0* 27.2*   NEUTSPOLYS 75.90*  --   --  88.80*  --  81.70*   LYMPHOCYTES 5.80*  --   --  3.50*  --  5.00*   MONOCYTES 3.30  --   --  3.40  --  11.70   EOSINOPHILS 0.00  --   --  0.00  --  0.00   BASOPHILS 0.00  --   --  0.90  --  0.00   ASTSGOT 2964*  --  3283* 3384*  --  3509*   ALTSGPT 674*  --  744* 823*  --  806*   ALKPHOSPHAT 124*  --  249* 271*  --  348*   TBILIRUBIN 11.6*  --  12.8* 12.6*  --  12.3*    < > = values in this interval not displayed.     Recent Labs     12/29/23  0505 12/29/23  0835 12/30/23  0500 12/30/23  0754 12/30/23 1712 12/31/23  0518   RBC 3.79*   < > 3.74*  --  3.64* 3.58*   HEMOGLOBIN 11.7*   < > 11.7*  --  11.6* 11.3*   HEMATOCRIT 34.3*   < > 34.7*  --  33.5* 33.9*   PLATELETCT 102*   < > 101*  --  84* 70*   PROTHROMBTM 24.6*  --  30.3*  --   --  36.3*   INR 2.19*  --  2.84*  --   --  3.59*   IRON  --   --   --  52  --   --    FERRITIN  --   --   --  1682.0*  --   --    TOTIRONBC  --   --   --  183*  --   --     < > = values in this interval not displayed.     Recent Results (from the past 24 hour(s))   Phosphorus    Collection Time: 12/30/23  5:12 PM   Result Value Ref Range    Phosphorus 5.5 (H) 2.5 - 4.5 mg/dL   Magnesium    Collection Time: 12/30/23  5:12 PM   Result Value Ref Range    Magnesium 2.0 1.5 - 2.5 mg/dL   Basic Metabolic Panel    Collection Time: 12/30/23  5:12 PM   Result Value Ref Range    Sodium 135 135 - 145 mmol/L    Potassium 5.6 (H) 3.6 - 5.5 mmol/L    Chloride 97 96 - 112 mmol/L    Co2 16 (L) 20 - 33 mmol/L    Glucose 89 65 - 99 mg/dL    Bun 11 8 - 22 mg/dL     Creatinine 1.44 (H) 0.50 - 1.40 mg/dL    Calcium 7.6 (L) 8.5 - 10.5 mg/dL    Anion Gap 22.0 (H) 7.0 - 16.0   LACTIC ACID    Collection Time: 12/30/23  5:12 PM   Result Value Ref Range    Lactic Acid 11.8 (HH) 0.5 - 2.0 mmol/L   CBC WITHOUT DIFFERENTIAL    Collection Time: 12/30/23  5:12 PM   Result Value Ref Range    WBC 27.0 (H) 4.8 - 10.8 K/uL    RBC 3.64 (L) 4.70 - 6.10 M/uL    Hemoglobin 11.6 (L) 14.0 - 18.0 g/dL    Hematocrit 33.5 (L) 42.0 - 52.0 %    MCV 92.0 81.4 - 97.8 fL    MCH 31.9 27.0 - 33.0 pg    MCHC 34.6 32.3 - 36.5 g/dL    RDW 59.7 (H) 35.9 - 50.0 fL    Platelet Count 84 (L) 164 - 446 K/uL    MPV 10.1 9.0 - 12.9 fL   POCT glucose device results    Collection Time: 12/30/23  5:12 PM   Result Value Ref Range    POC Glucose, Blood 92 65 - 99 mg/dL   IMMATURE PLT FRACTION    Collection Time: 12/30/23  5:12 PM   Result Value Ref Range    Imm. Plt Fraction 6.1 0.6 - 13.1 %   ESTIMATED GFR    Collection Time: 12/30/23  5:12 PM   Result Value Ref Range    GFR (CKD-EPI) 68 >60 mL/min/1.73 m 2   EC-ECHOCARDIOGRAM COMPLETE W/ CONT    Collection Time: 12/30/23  5:31 PM   Result Value Ref Range    Eject.Frac. MOD BP 70.15     Eject.Frac. MOD 4C 73.18     Eject.Frac. MOD 2C 73.01    Phosphorus    Collection Time: 12/31/23 12:00 AM   Result Value Ref Range    Phosphorus 6.1 (H) 2.5 - 4.5 mg/dL   Magnesium    Collection Time: 12/31/23 12:00 AM   Result Value Ref Range    Magnesium 2.2 1.5 - 2.5 mg/dL   Basic Metabolic Panel    Collection Time: 12/31/23 12:00 AM   Result Value Ref Range    Sodium 134 (L) 135 - 145 mmol/L    Potassium 5.9 (H) 3.6 - 5.5 mmol/L    Chloride 98 96 - 112 mmol/L    Co2 16 (L) 20 - 33 mmol/L    Glucose 69 65 - 99 mg/dL    Bun 10 8 - 22 mg/dL    Creatinine 1.45 (H) 0.50 - 1.40 mg/dL    Calcium 7.4 (L) 8.5 - 10.5 mg/dL    Anion Gap 20.0 (H) 7.0 - 16.0   ESTIMATED GFR    Collection Time: 12/31/23 12:00 AM   Result Value Ref Range    GFR (CKD-EPI) 67 >60 mL/min/1.73 m 2   POCT glucose device  results    Collection Time: 12/31/23 12:05 AM   Result Value Ref Range    POC Glucose, Blood 74 65 - 99 mg/dL   POCT glucose device results    Collection Time: 12/31/23  5:04 AM   Result Value Ref Range    POC Glucose, Blood 43 (L) 65 - 99 mg/dL   Phosphorus    Collection Time: 12/31/23  5:18 AM   Result Value Ref Range    Phosphorus 6.4 (H) 2.5 - 4.5 mg/dL   Magnesium    Collection Time: 12/31/23  5:18 AM   Result Value Ref Range    Magnesium 2.2 1.5 - 2.5 mg/dL   LACTIC ACID    Collection Time: 12/31/23  5:18 AM   Result Value Ref Range    Lactic Acid 12.2 (HH) 0.5 - 2.0 mmol/L   CBC WITH DIFFERENTIAL    Collection Time: 12/31/23  5:18 AM   Result Value Ref Range    WBC 27.2 (H) 4.8 - 10.8 K/uL    RBC 3.58 (L) 4.70 - 6.10 M/uL    Hemoglobin 11.3 (L) 14.0 - 18.0 g/dL    Hematocrit 33.9 (L) 42.0 - 52.0 %    MCV 94.7 81.4 - 97.8 fL    MCH 31.6 27.0 - 33.0 pg    MCHC 33.3 32.3 - 36.5 g/dL    RDW 63.2 (H) 35.9 - 50.0 fL    Platelet Count 70 (L) 164 - 446 K/uL    MPV 10.5 9.0 - 12.9 fL    Neutrophils-Polys 81.70 (H) 44.00 - 72.00 %    Lymphocytes 5.00 (L) 22.00 - 41.00 %    Monocytes 11.70 0.00 - 13.40 %    Eosinophils 0.00 0.00 - 6.90 %    Basophils 0.00 0.00 - 1.80 %    Nucleated RBC 3.40 (H) 0.00 - 0.20 /100 WBC    Neutrophils (Absolute) 22.44 (H) 1.82 - 7.42 K/uL    Lymphs (Absolute) 1.36 1.00 - 4.80 K/uL    Monos (Absolute) 3.18 (H) 0.00 - 0.85 K/uL    Eos (Absolute) 0.00 0.00 - 0.51 K/uL    Baso (Absolute) 0.00 0.00 - 0.12 K/uL    NRBC (Absolute) 0.91 K/uL   Comp Metabolic Panel    Collection Time: 12/31/23  5:18 AM   Result Value Ref Range    Sodium 133 (L) 135 - 145 mmol/L    Potassium 5.9 (H) 3.6 - 5.5 mmol/L    Chloride 97 96 - 112 mmol/L    Co2 17 (L) 20 - 33 mmol/L    Anion Gap 19.0 (H) 7.0 - 16.0    Glucose 46 (LL) 65 - 99 mg/dL    Bun 10 8 - 22 mg/dL    Creatinine 1.54 (H) 0.50 - 1.40 mg/dL    Calcium 7.3 (L) 8.5 - 10.5 mg/dL    Correct Calcium 8.6 8.5 - 10.5 mg/dL    AST(SGOT) 3509 (HH) 12 - 45 U/L     ALT(SGPT) 806 (H) 2 - 50 U/L    Alkaline Phosphatase 348 (H) 30 - 99 U/L    Total Bilirubin 12.3 (H) 0.1 - 1.5 mg/dL    Albumin 2.4 (L) 3.2 - 4.9 g/dL    Total Protein 4.2 (L) 6.0 - 8.2 g/dL    Globulin 1.8 (L) 1.9 - 3.5 g/dL    A-G Ratio 1.3 g/dL   Prothrombin Time    Collection Time: 12/31/23  5:18 AM   Result Value Ref Range    PT 36.3 (H) 12.0 - 14.6 sec    INR 3.59 (H) 0.87 - 1.13   ESTIMATED GFR    Collection Time: 12/31/23  5:18 AM   Result Value Ref Range    GFR (CKD-EPI) 62 >60 mL/min/1.73 m 2   DIFFERENTIAL MANUAL    Collection Time: 12/31/23  5:18 AM   Result Value Ref Range    Bands-Stabs 0.80 0.00 - 10.00 %    Myelocytes 0.80 %    Manual Diff Status PERFORMED    PERIPHERAL SMEAR REVIEW    Collection Time: 12/31/23  5:18 AM   Result Value Ref Range    Peripheral Smear Review see below    PLATELET ESTIMATE    Collection Time: 12/31/23  5:18 AM   Result Value Ref Range    Plt Estimation Decreased    MORPHOLOGY    Collection Time: 12/31/23  5:18 AM   Result Value Ref Range    RBC Morphology Present     Polychromia 1+     Poikilocytosis 3+     Echinocytes 3+     Toxic Vacuoles Many    IMMATURE PLT FRACTION    Collection Time: 12/31/23  5:18 AM   Result Value Ref Range    Imm. Plt Fraction 7.2 0.6 - 13.1 %       Radiology Review:  DX-CHEST-PORTABLE (1 VIEW)   Final Result      Stable bibasilar atelectasis.      EC-ECHOCARDIOGRAM COMPLETE W/ CONT   Final Result      DX-CHEST-PORTABLE (1 VIEW)   Final Result         1. Stable lines and tubes.   2. Interval improvement in bilateral pulmonary opacities. No large pleural effusions.         DX-CHEST-PORTABLE (1 VIEW)   Final Result         No significant interval change      DX-CHEST-LIMITED (1 VIEW)   Final Result      DX-CHEST-LIMITED (1 VIEW)   Final Result      DX-CHEST-LIMITED (1 VIEW)   Final Result      Endotracheal tube terminates high, just above the thoracic inlet and would benefit from being advanced 5 cm      Decrease in lung volumes with bibasilar  atelectasis, likely pulmonary edema. Pleural fluid is possible      Voalte sent to and replied by BEATRIZ ROTHMAN on 12/28/2023 12:03 PM.      US-RUQ   Final Result         1. Cirrhotic liver.   2. Portal vein is patent.   3. Mild ascites.      DX-ABDOMEN FOR TUBE PLACEMENT   Final Result      Malpositioned nasogastric tube, projecting into the right lower lobe. This was discussed with the patient's nurse Caryn  at 0345 hours.      CT-HEAD W/O   Final Result         1. No evidence of acute intracranial hemorrhage or mass lesion.                     DX-CHEST-PORTABLE (1 VIEW)   Final Result         Endotracheal tube with tip projecting over the mid thoracic trachea.      Right central venous catheter with tip projecting over the expected area of the SVC.            MDM (Data Review):   -Records reviewed and summarized in current documentation  -I personally reviewed and interpreted the laboratory results  -I personally reviewed the radiology images    Assessment/Recommendations:  Decompensated cirrhosis  Portal hypertensive gastropathy  Esophageal varices bleeding  Hemorrhagic shock  Gastric ulcer  Duodenal ulcer  Necrosis of upper GI mucosa  Acute liver failure- likely shock liver due to hemorrhagic shock  Lactic Acidosis  Encephalopathy  DB secondary to ATN-nephrology following      MDM:  This is a 28-year-old male with a past medical history as listed above who presented to [facility] on 12/28/2023 with hemorrhagic shock secondary to upper GI bleeding.  Patient underwent EGD on 12/28/2023-unable to completely evaluate upper GI secondary to large amount of blood in stomach with overflow into esophagus.  Likely esophageal bleeding.  Patient had [place] placed at conclusion of exam.  Repeat EGD on 12/29/2023 showed duodenal ulcer a 1 cm polyp, clean-based nonbleeding.  Diffuse mucosal damage in the stomach with necrosis with underlying portal hypertensive gastropathy, multiple erosions and  ulcers mainly at the body and antrum with oozing.  No gastric varix.  Furthermore there was esophageal varices with mucosal damage/erosions and evidence of necrosis.  S/p banding x 6.  Postprocedurally, patient remains on PPI, octreotide.  He also remains intubated due to instability.  Hemoglobin stable at 11.3, BUN normal.  Patient with melena today.  Suspect this is likely due to residual blood that is passing through bowels.  Bleeding controlled at this time.    Plan:  Continue to monitor H/H and for signs of rebleeding-transfuse as necessary  Ok to place IRIS for nutrtion and enteral lactulose  Lactulose-titrate to 2-3 bowel moods per day  Continue ceftriaxone for SBP prophylaxis  Continue PPI and octreotide  Consider rifaximin    Discussed with family bedside, Dr. Kelly, Dr. Encarnacion      Core Quality Measures   Reviewed items::  Labs, Medications and Radiology reports reviewed

## 2023-12-31 NOTE — PROGRESS NOTES
"Resident ICU Progress Note    Admit Date: 12/28/2023  Resident(s): Naveen Mcgrath M.D.   Attending:  Daniella Kelly D.o.    Patient ID:    Name:  Mk Virk     YOB: 1995  Age:  28 y.o.  male   MRN:  [id number]    Chief Complaint  Patient is a 28 y.o. male admitted 12/28/2023 with hemorrhagic shock from acute upper GI bleed from varices, cardiac arrest, acute hypoxic respiratory failure, DB, alcoholic cirrhosis.    Hospital Course (carried forward and updated):  \"HPI: 28y M hx of alcohol abuse with 1pint of hard alcohol a day. Was recently admitted for upper GI bleed for variceal hemorrhage 12/18 requiring 7 bands. After leaving hospital patient started turning yellow per wife he has been trying to cut down on drinking but using gummies. He then last night started to vomiting bright red blood filling up an entire barf bag with bright red blood. She couldn't get him to come to hospital but tonight she was able to. He arrived and was being transferred here from outside hospital when he suffered a cardiac arrest requiring 5-10 minutes of CPR and was diverted to [facility] where he was intubated and had central line placed. He was obtained ROCS and transferred here for higher level of care. He is currently intubated so hx is taken from [facility] at bedside. He was able to be weaned off pressor on arrival here. He found to have db with hyperkalemia to 7, Hg was 10.8 and plt of 92, INR of 3.2, bili of 8.2, cr of 2.45, bicarb of 11. He was given a total of 12 units PRBC,4 FFP, 1 PLT prior to arrival with TXA. GI has been consulted. He will be DNR I okay.\" -Per Dr. Patiño's Note           \"12/28/2023  Prior to the start of my shift, patient had received a total of 12 units of PRBCs, 4 units of FFP, 2 units of platelets, 1 unit of cryo.  Patient was noted to have ongoing melena and became hypotensive and tachycardic.  I ordered a red box and it was transfused via the Mobile rapid infuser.  As " "patient became more stable, EGD was started by GI at bedside.  They were unable to visualize the esophagus and placed a [place] tube.    Patient continued to be hypotensive and second red box was started.  After placement of the [place] tube, patient had increasing peak pressures and was desaturating.  I attempted to pass the bronchoscope through his ET tube with concern for hypoxemia due to aspiration of blood.  I was unable to pass the bronchoscope and noted that the ET tube was completely occluded, presumably from the [place] tube.  We had to give the patient propofol and paralytics in order to open his mouth for further inspection.  When we were able to open his mouth we noted that the esophageal portion of the [place] tube was inflated in his posterior pharynx causing compression of his tongue and ET tube.  The tube was deflated both in the esophageal and gastric balloons.  Bronchoscopy was completed at this time showing some minimal blood, mostly in the right mainstem which was suctioned out.  I was unable to fully inspect his airways due to the acuity of his illness.  After deflation of the [place] tube, patient continued to have ongoing bleeding from his mouth and rectum.  I advanced the Minnesota tube down and attempted to inflate the gastric balloon.  At this point,  from GI presented to bedside to help and pulled out the [place] tube due to concern for the gastric balloon malfunctioning.  He placed a new Minnesota tube which was confirmed on x-ray.  During this time, patient continued to have episodes of hypotension and received a third red box of blood.  Ventilator had to be increasingly titrated up due to hypoxemia, patient is currently on 100% FiO2 and 16 of PEEP\" - Per Dr. Kelly's Note     12/29 - Overnight right radial arterial line was switched to right axillary arterial line due to discordance between cuff and line pressures. Bronchoscopy w/ BAL was also performed with " ET tube repositioning as well as suction of copious bloody secretions in airways. Flagyl added alongside C3. Currently receiving CRRT with gentle PUF of 150 cc/hr, 2.25 L removed. On APV CMV with improved ABG of 7.255/33.4/72. EGD performed with successful esophageal banding x 6.     :  - [place] tube out, starting lactulose enema  - Continued pressors + CRRT  - Ventilated  - CTX and flagyl for PNA  - GI following, protonix + octreotide  - solumedrol for alcoholic hepatitis (Maddrey pejai=[id number])  - Autoimmune panel and ferritin sent for autoimmune workup.    Consultants:  GI: Dr. Encarnacion  Nephro: Dr. Vick    Interval Events:    Reviewed last 24 hour events:  Neuro:   encephalopathic  HR:    66-91  SBP:      Tmax:   95.4-99.3  GI:    Lactulose enema + protonix  I/O:    4.6L in, 8.5L out. Net= -3.9L (majority fluid loss via dialysis)  Lines:   CVC, HD cath, PIVx2, rob, ETT  Mobility: ventilated  Resp:  Vent day 4, settings: 34/450/+14/90%  AB/30: 7.274/32.6/88/15.1  CXR:  : stable atelectasis  Vte:    No rx, high bleed risk  PPI/H2: Protonix  Antibx:  C3 + flagyl  GTT:  Levo+vaso+epi, protonix+octreotide, versed+fent, dextrose    Review of Systems  Review of Systems   Unable to perform ROS: Intubated       Vital Signs for the last 24 hours  Temp:  [35.2 °C (95.4 °F)-37.4 °C (99.3 °F)] 37.1 °C (98.8 °F)  Pulse:  [52-95] 90  Resp:  [21-71] 21  BP: ()/(35-80) 87/40  SpO2:  [93 %-100 %] 97 %    Hemodynamic parameters for the last 24 hours       Vent Settings for the last 24 hours  Vent Mode: APVCMV  Rate (breaths/min): 34  Vt Target (mL): 450  PEEP/CPAP: 14  MAP: 17  Control VTE (exp VT): 519    Physical Exam  Physical Exam    Medications  Current Facility-Administered Medications   Medication Dose Route Frequency Provider Last Rate Last Admin    norepinephrine (Levophed) infusion 32 mg/500 mL (premix)  0-1 mcg/kg/min (Ideal) Intravenous Continuous RAMIN Cobian.NEIL 60 mL/hr at  12/31/23 1101 0.85 mcg/kg/min at 12/31/23 1101    metroNIDAZOLE (Flagyl) IVPB 500 mg  500 mg Intravenous Q12HRS Hector Patiño M.D.   Stopped at 12/31/23 0649    dextrose 10% infusion   Intravenous Continuous Cristela Weaver M.D. 75 mL/hr at 12/31/23 0900 Rate Verify at 12/31/23 0900    octreotide (SandoSTATIN) 1,250 mcg in  mL Infusion  50 mcg/hr Intravenous Continuous Hector Patiño M.D. 10 mL/hr at 12/31/23 0840 50 mcg/hr at 12/31/23 0840    pantoprazole (Protonix) 80 mg in  mL infusion  8 mg/hr Intravenous Continuous Hector Patiño M.D. 25 mL/hr at 12/31/23 1047 8 mg/hr at 12/31/23 1047    Respiratory Therapy Consult   Nebulization Continuous RT Hector Patiño M.D.        lidocaine (Xylocaine) 1 % injection 2 mL  2 mL Tracheal Tube Q30 MIN PRN Hector Patiño M.D.        cefTRIAXone (Rocephin) syringe 1,000 mg  1,000 mg Intravenous Q24HRS Hector Patiño M.D.   1,000 mg at 12/31/23 0736    insulin regular (HumuLIN R,NovoLIN R) injection  2-9 Units Subcutaneous Q6HRS Hector Patiño M.D.        And    dextrose 10 % BOLUS 25 g  25 g Intravenous Q15 MIN PRN Hector Patiño M.D. 999 mL/hr at 12/31/23 0620 25 g at 12/31/23 0620    lactulose 10 GM/15ML solution 300 mL  300 mL Rectal Q6HRS Sylvia Chavez M.D.   300 mL at 12/30/23 1800    fentaNYL (Sublimaze) injection 100 mcg  100 mcg Intravenous Q15 MIN PRN Sylvia Chavez M.D.   100 mcg at 12/28/23 0546    And    fentaNYL (Sublimaze) injection 200 mcg  200 mcg Intravenous Q15 MIN PRN Sylvia Chavez M.D.        vasopressin (Vasostrict) 20 Units in  mL Infusion  0.03 Units/min Intravenous Continuous Sylvia Chavez M.D. 9 mL/hr at 12/31/23 0842 0.03 Units/min at 12/31/23 0842    methylPREDNISolone (SOLU-MEDROL) 40 MG injection 40 mg  40 mg Intravenous DAILY Daniella Kelly D.O.   40 mg at 12/31/23 0535       Fluids    Intake/Output Summary (Last 24 hours) at 12/31/2023 1143  Last data filed at 12/31/2023 1100  Gross per 24  hour   Intake 4135.13 ml   Output 8302 ml   Net -4166.87 ml       Laboratory  Recent Labs     12/29/23  2312 12/30/23  0506 12/30/23  1128   ISTATAPH 7.224* 7.252* 7.274*   ISTATAPCO2 36.6 33.4 32.6   ISTATAPO2 85 89* 88*   ISTATATCO2 16* 16* 16*   ECSDWFM4LQW 94 95 96   ISTATARTHCO3 15.1* 14.7* 15.1*   ISTATARTBE -12* -11* -11*   ISTATTEMP 36.8 C 36.2 C 36.7 C   ISTATFIO2 100 100 100   ISTATSPEC Arterial Arterial Arterial   ISTATAPHTC 7.227* 7.263* 7.278*   DFJRBPJR8PB 84 85 87     Recent Labs     12/30/23 1712 12/31/23  0000 12/31/23  0518   SODIUM 135 134* 133*   POTASSIUM 5.6* 5.9* 5.9*   CHLORIDE 97 98 97   CO2 16* 16* 17*   BUN 11 10 10   CREATININE 1.44* 1.45* 1.54*   MAGNESIUM 2.0 2.2 2.2   PHOSPHORUS 5.5* 6.1* 6.4*   CALCIUM 7.6* 7.4* 7.3*     Recent Labs     12/29/23  2320 12/30/23  0500 12/30/23  1132 12/30/23 1712 12/31/23  0000 12/31/23  0518   ALTSGPT 744* 823*  --   --   --  806*   ASTSGOT 3283* 3384*  --   --   --  3509*   ALKPHOSPHAT 249* 271*  --   --   --  348*   TBILIRUBIN 12.8* 12.6*  --   --   --  12.3*   DBILIRUBIN 9.5*  --   --   --   --   --    GLUCOSE  --  81   < > 89 69 46*    < > = values in this interval not displayed.     Recent Labs     12/29/23  0505 12/29/23  0835 12/29/23  2320 12/30/23  0500 12/30/23  1712 12/31/23  0518   WBC 27.1*   < >  --  26.8* 27.0* 27.2*   NEUTSPOLYS 75.90*  --   --  88.80*  --  81.70*   LYMPHOCYTES 5.80*  --   --  3.50*  --  5.00*   MONOCYTES 3.30  --   --  3.40  --  11.70   EOSINOPHILS 0.00  --   --  0.00  --  0.00   BASOPHILS 0.00  --   --  0.90  --  0.00   ASTSGOT 2964*  --  3283* 3384*  --  3509*   ALTSGPT 674*  --  744* 823*  --  806*   ALKPHOSPHAT 124*  --  249* 271*  --  348*   TBILIRUBIN 11.6*  --  12.8* 12.6*  --  12.3*    < > = values in this interval not displayed.     Recent Labs     12/29/23  0505 12/29/23  0835 12/30/23  0500 12/30/23  0754 12/30/23  1712 12/31/23  0518   RBC 3.79*   < > 3.74*  --  3.64* 3.58*   HEMOGLOBIN 11.7*   < > 11.7*   --  11.6* 11.3*   HEMATOCRIT 34.3*   < > 34.7*  --  33.5* 33.9*   PLATELETCT 102*   < > 101*  --  84* 70*   PROTHROMBTM 24.6*  --  30.3*  --   --  36.3*   INR 2.19*  --  2.84*  --   --  3.59*   IRON  --   --   --  52  --   --    FERRITIN  --   --   --  1682.0*  --   --    TOTIRONBC  --   --   --  183*  --   --     < > = values in this interval not displayed.       Imaging  DX-CHEST-PORTABLE (1 VIEW)   Final Result      Stable bibasilar atelectasis.      EC-ECHOCARDIOGRAM COMPLETE W/ CONT   Final Result      DX-CHEST-PORTABLE (1 VIEW)   Final Result         1. Stable lines and tubes.   2. Interval improvement in bilateral pulmonary opacities. No large pleural effusions.         DX-CHEST-PORTABLE (1 VIEW)   Final Result         No significant interval change      DX-CHEST-LIMITED (1 VIEW)   Final Result      DX-CHEST-LIMITED (1 VIEW)   Final Result      DX-CHEST-LIMITED (1 VIEW)   Final Result      Endotracheal tube terminates high, just above the thoracic inlet and would benefit from being advanced 5 cm      Decrease in lung volumes with bibasilar atelectasis, likely pulmonary edema. Pleural fluid is possible      Voalte sent to and replied by BEATRIZ ROTHMAN on 12/28/2023 12:03 PM.      US-RUQ   Final Result         1. Cirrhotic liver.   2. Portal vein is patent.   3. Mild ascites.      DX-ABDOMEN FOR TUBE PLACEMENT   Final Result      Malpositioned nasogastric tube, projecting into the right lower lobe. This was discussed with the patient's nurse Caryn  at 0345 hours.      CT-HEAD W/O   Final Result         1. No evidence of acute intracranial hemorrhage or mass lesion.                     DX-CHEST-PORTABLE (1 VIEW)   Final Result         Endotracheal tube with tip projecting over the mid thoracic trachea.      Right central venous catheter with tip projecting over the expected area of the SVC.          ASSESSEMENT and PLAN:    * Esophageal varices with hemorrhage (HCC)- (present on admission)  Assessment &  Plan  Status post attempted banding on 12/28/2023, s/p [place] tube  Blood product count: PRBC 24, FFP 16, platelet 4, cryo 2  - Protonix gtt   - Octreotide gtt  - GI following  - Monitor Hgb, transfuse <7      Transaminitis  Assessment & Plan  2/2 Alcoholic hepatitis +/- shock liver. AST>ALT. Both values increasing as of 12/31  - Treatment per EtOH hepatitis problem  - CTM    Encephalopathy, anoxic, hepatic, toxic  Assessment & Plan  Patient did undergo cardiac arrest and has had episodes of hypoxemia.  He also has an elevated ammonia level with known cirrhosis.  Furthermore, patient has been acidotic and uremic.  - CRRT for uremia and acidosis  - Rectal lactulose  - Strict n.p.o., patient cannot receive po lactulose at this time  - Will continue to monitor mental status. Currently sedated and intubated    Hemorrhagic shock (HCC)  Assessment & Plan  2/2 variceal hemorrhage, Hgb has been stable in the 11's.  - BP support with norepi and vasopressin  - Monitor Hgb    Acute hypoxemic respiratory failure (HCC)  Assessment & Plan  Suspect due to aspiration PNA and fluid overload  - Good fluid removal achieved with CRRT, will reduce fluid removal  - Abx: Day 4 C3, Day 3 flagyl  - ABG  - ETT/Ventilation      Lactic acidosis  Assessment & Plan  Due to hemorrhagic shock and cardiac arrest  - Continue to trend  - CRRT      Liver failure (HCC)  Assessment & Plan  Acute on chronic, acute component likely secondary to alcoholic hepatitis versus shock liver  - Janice discriminant function =119 points on 12/31  - Solu-Medrol 40 mg daily for alcoholic hepatitis  - Hepatic dosing for medications    Acute renal failure with tubular necrosis (HCC)  Assessment & Plan  Due to hemorrhagic shock and cardiac arrest. Cr improved from earlier this hospitalization but still well above baseline.  - Continue CRRT  - Electrolytes    Leukocytosis- (present on admission)  Assessment & Plan  Pt has likely PNA and alcoholic hepatitis. WBCs  elevated, neutrophil predominant.    Alcohol dependence (HCC)- (present on admission)  Assessment & Plan  Will give IV thiamine and multivitamins    Cirrhosis, Likely alcoholic- (present on admission)  Assessment & Plan  MELD 40   With bleeding varices, see plan below        VTE:  Contraindicated  Ulcer: PPI  Lines: CVC, HD cath, PIVx2, rob, ETT    Naveen Mcgrath M.D.  [facility] Resident

## 2023-12-31 NOTE — PROGRESS NOTES
Hypoglycemia Intervention    Hypoglycemia protocol intervention:  Blood glucose 42 at 0515.  Intervention: 25 g IV dextrose per MAR   Repeat blood glucose 86 at 0605 .  Intervention: 25 g IV dextrose per MAR   Additional interventions needed: Recheck BGL per MAR. BGL rechecked @ 0700, 113.  Dr. Weaver notified of the above at 0518.  Day resident notified of repeat BGL of 86, administered another 25g D10 IV.  Dr. Kelly also notified, no changes in current interventions.

## 2024-01-01 LAB — NSE SERPL IA-MCNC: 33.3 NG/ML

## 2024-01-01 NOTE — PROGRESS NOTES
Received report from Laina SPEAR. Pt is unstable with declining BP, transient VT/AF/ST. Discussions with family in process for withdrawal of care and transitioning to comfort care. Per MD, no escalation of treatment at this time.

## 2024-01-01 NOTE — PROGRESS NOTES
Pulmonary and Critical Care Medicine Progress Note    This gentleman has been critically ill with hemorrhagic shock, upper gastrointestinal hemorrhage due to esophageal varices, cirrhosis and acute kidney injury.  He has had high vasopressor requirements.  This evening he developed ventricular tachycardia.  His CRRT was stopped.  He is DNR status as resuscitative efforts would be futile in achieving the goal of saving his life.  I had discussions with his family and they have appropriately decided to transition to comfort measures only.    Anatoliy Hi MD  Pulmonary and Critical Care Medicine

## 2024-01-01 NOTE — PROGRESS NOTES
Received call from BRIAN Jean RN pt hypotension will be off tx. ICU RN will returned blood and cap the CVC. Net UF 2.142 L from 0700 to 1700.

## 2024-01-01 NOTE — PROGRESS NOTES
Pt increasingly more hypotensive after repositioning patient. Slow increase of vasopressors and incrementally decreasing UF from 250 to 100.   1725 Pt converted to VT 130s-160s, hypotensive 40-50s systolic. Staff assist called, CRRT blood returned to pt per protocol. Dr. Hi at bedside. Per Dr. Hi, no cardioversion. Plan for no further escalation of care or increase of vasopressors, and discontinuation of CRRT. Family bedside.

## 2024-01-01 NOTE — DISCHARGE SUMMARY
Critical Care Medicine Death Summary    Attending: Daniella Kelly D.o.  Senior Resident: Dr. Jeffery    Admission Date  12/28/2023    Cause of Death  Hemorrhagic shock due to upper gastrointestinal hemorrhage due to esophageal varices in the setting of cirrhosis    Comorbid Conditions at the Time of Death  Principal Problem:    Esophageal varices with hemorrhage (HCC) (POA: Yes)  Active Problems:    Cirrhosis, Likely alcoholic (POA: Yes)    Alcohol dependence (HCC) (POA: Yes)    Leukocytosis (POA: Yes)    Acute renal failure with tubular necrosis (HCC) (POA: Unknown)    Liver failure (HCC) (POA: Unknown)    Lactic acidosis (POA: Unknown)    Acute hypoxemic respiratory failure (HCC) (POA: Unknown)    Hemorrhagic shock (HCC) (POA: Unknown)    Encephalopathy, anoxic, hepatic, toxic (POA: Unknown)    Atelectasis (POA: Unknown)    Cardiac arrest (HCC) (POA: Unknown)    Coagulopathy (HCC) (POA: Unknown)    Transaminitis (POA: Unknown)  Resolved Problems:    * No resolved hospital problems. *    History of Presenting Illness and Hospital Course    This is a 28 y.o. male with PMHx of alcohol use disorder c/b recent h/o UGIB 2/2 variceal hemorrhage 12/18 requiring 7 bands (1 pint of hard liquor daily), cirrhosis who was admitted 12/28/2023 as a transfer from [place] where patient initially presented for tarry stools and subsequently had cardiac arrest with hemorrhagic shock secondary to UGIB in the setting of esophageal varices and cirrhosis.     History of present illness:  Upon arrival to [facility], medical history and history of present illness obtained from patient's Misael suarez (can be contacted at [phone]).  After recent discharge few weeks prior, kathryn notes patient turning yellow and subsequently began vomiting bright red blood filling up an entire barf bag night prior to admission.  Patient is from [place] and was planning on establishing care with GI group in [place] but has not been seen yet.  Patient has history of alcohol abuse, drinks 1/5 of alcohol daily with attempts to cut back with the help of THC/CBD gummies. She denies patient uses any other illicit/recreational substances. She reports patient began having hematemesis yesterday, approximate volume ~1L as he was using emesis bags. She reports the volume decreased over the day and patient refused to seek medical attention.     OSH course:  He arrived and was being transferred here from outside hospital when he suffered a cardiac arrest requiring 5-10 minutes of CPR and was diverted to [facility] where he was intubated and had central line placed. He was obtained ROSC.    ER course:  Upon arrival, patient's labs are notable for hgb of 10.8, platelets of 92, K of 7.2, AST of 718, ALT of 174, T. Bili of 8.2, albumin of 1.9, BUN of 33, Creatinine of 2.45. Cottrell catheter was placed and patient was notably anuric.  Patient already intubated prior to arrival and was able to be weaned off pressor upon arrival at [facility]. Admitted to CICU for postcardiac arrest management and hemorrhagic shock requiring massive transfusion.    #Upper GI bleed  #Esophageal varices, s/p [place] tube and banding  #Acute on chronic liver failure, ischemic and possibly alcoholic  #Coagulopathy, no sign of bleeding  #Newly diagnosed cirrhosis, possibly alcoholic + fatty liver  #Coagulopathy  #Thrombocytopenia  #Alcohol use disorder   -Likely variceal hemorrhage s/p massive transfusion and cardiac arrest.   -Meld Na = 37 on admission, with Madderys discrimination factor 97--started trial of steroids on admission.   -RUQ US 12/28 confirmed portal vein patent.   -GI consulted this admission and patient continued on C3, octreotide, ppi. S/p 12 units PRBC, 4 FFP, 1 PLT, TXA.    -EGD 12/28 (Dr Encarnacion) demonstrated likely esophageal variceal bleeding although unable to confirm bleeding source due to large amount of blood in stomach with overflow to esophagus--empiric banding attempted x  4 with only 1 staying steadily due to friable mucosa and scar from previous treatment.  Recommendation for IR intervention given high mortality from GI bleeding and terminal liver disease.    -Repeat EGD 12/29 (Dr Encarnacion)demonstrated duodenal ulcers 1 cm at bulb, clean base without bleeding and diffuse mucosal damage of stomach with necrosis.  Also notable for underlying PHG with multiple erosions and ulcers mainly at body and antrum--requiring 6 banding at all 4 quadrants.     #Encephalopathy  -Multifactorial in setting of anoxia and cirrhosis  -Given lactulose and rifaxamin through this admission    #Hemorrhagic shock  #Acute blood loss anemia  #Cardiac arrest 2/2 above  Related to massive GI bleed and hypotension  -CT head 12/28 WNL  -TTE 12/30 notable for moderate concentric LVH otherwise hyperdynamic left ventricular systolic function (EF 75%) without any regional wall motion abnormalities  -Required pressor support this admission     #Acute renal failure (Scr max 3.38)  -Likely arrest related and ischemic ATN  -Nephrology consulted (Dr. Baxter) and patient started on CRRT this admission in setting of tenuous hemodynamics and massive transfusion requirements    #High anion gap metabolic acidosis  -In setting of lactic acidosis post arrest  -Beta hydroxy level WNL  -Started on high-dose IV thiamine on admission.    Ultimately, given pt poor prognosis and declining medical condition, extensive discussion with family was conducted and decision was made to transition pt to comfort care on 12/31/23 with pt passing away shortly after.    Consultants  critical care, GI, and nephrology    Procedures  Inpatient standard video electroencephalogram 12/28 (Dr. Mason)  Arterial line insertion 12/28 (Dr Kelly)   EGD 12/28 (Dr Encarnacion)  Bronchoscopy 12/28 (Dr. Kelly)  Central line insertion 12/28 (Dr. Kelly)  Diagnostic and therapeutic flexible fiberoptic bronchoscopy with bronchoalveolar lavage 12/28 (Dr. Alvarez  Fracisco)  Arterial line placement 12/28 (Dr. Hi)  EGD 12/29 (Dr Encarnacion)    Death Date: 12/31/23   Death Time: 2032    Pronounced By (RN1): Pineda Hernandez  Pronounced By (RN2): Rhea Xavier

## 2024-01-02 LAB
BACTERIA BLD CULT: NORMAL
BACTERIA BLD CULT: NORMAL
MYELOPEROXIDASE AB SER-ACNC: 0 AU/ML (ref 0–19)
NUCLEAR IGG SER QL IA: DETECTED
PROTEINASE3 AB SER-ACNC: 0 AU/ML (ref 0–19)
SIGNIFICANT IND 70042: NORMAL
SIGNIFICANT IND 70042: NORMAL
SITE SITE: NORMAL
SITE SITE: NORMAL
SMA IGG SER-ACNC: 4 UNITS (ref 0–19)
SOURCE SOURCE: NORMAL
SOURCE SOURCE: NORMAL

## 2024-01-03 LAB
ANA INTERPRETIVE COMMENT Q5143: NORMAL
ANTINUCLEAR ANTIBODY (ANA), HEP-2, IGG Q5142: NORMAL

## 2024-01-11 NOTE — DOCUMENTATION QUERY
"                                                                         [facility]                                                                       Query Response Note      PATIENT:               FABIAN HUERTA  ACCT #:                  [id number]  MRN:                     [id number]  :                      1995  ADMIT DATE:       2023 12:09 AM  DISCH DATE:        2023 8:32 PM  RESPONDING  PROVIDER #:        [id number]           QUERY TEXT:    Sepsis is documented in the progress notes by consulting provider only . This diagnosis was not documented in the discharge summary.     Can a diagnosis be provided based on the clinical indicators documented in the medical record.      The patient's Clinical Indicators include:  Clinical indicators:  Patient is a 28 y.o. male with a past medical history of alcohol abuse who presented on 28 as a transfer from [place] where patient was seen for ETOH/cullen blood/tarry stools/esophageal varices. Patient coded at outside hospital and twice en-route.     Sepsis-  noted only by Nephrology consulting provider  indicating \"etiology unclear\"    DB    Most Recent Value 2023  Blood Pressure: 58/23 Abnormal   as of 2023 -- -- --  Pulse: 71  as of 2023 71 129 Abnormal  99  Respiration: 3 Abnormal   as of 2023 3 Abnormal  7 Abnormal  35 Abnormal   Temperature: 37.7 °C (99.9 °F)  as of 2023 -- -- --  Temp src: Not taken -- -- --  Pulse Oximetry: 63% Abnormal   as of 2023 63 % Abnormal  97 % 88 %      Treatment or Monitoring: On pressors, CRRT    Risk Factors: Duodenal ulcers, esophageal bleeding with gastric ulcer, alcoholic cirrhosis,     [place] contact information: [email or web address]  Options provided:   -- Sepsis present on admission -, (please document known or suspected etiology)   -- Sepsis developed after admission -, (please document known or suspected etiology)   -- Other explanation-, Please " specify   -- Unable to determine      Query created by: Lesli Williamson on 1/11/2024 12:55 PM    RESPONSE TEXT:    Other explanation- did not have sepsis. severe lactic acidosis due to cardiac arrest and liver failure          Electronically signed by:  BEATRIZ ROTHMAN MD 1/11/2024 1:43 PM

## (undated) DEVICE — BLOCK BITE MAXI DENTAL RETENTION RIM (100EA/BX)

## (undated) DEVICE — TUBE CONNECTING SUCTION - CLEAR PLASTIC STERILE 72 IN (50EA/CA)

## (undated) DEVICE — WATER IRRIGATION STERILE 1000ML (12EA/CA)

## (undated) DEVICE — CONTAINER, SPECIMEN, STERILE

## (undated) DEVICE — CANISTER SUCTION RIGID RED 1500CC (40EA/CA)

## (undated) DEVICE — BUTTON ENDOSCOPY DISPOSABLE

## (undated) DEVICE — SPEEDBAND SUPERVIEW SUPER 7 (4/BX)

## (undated) DEVICE — SET EXTENSION WITH 2 PORTS (48EA/CA) ***PART #2C8610 IS A SUBSTITUTE*****

## (undated) DEVICE — MASK PANORAMIC OXYGEN PRO2 (30EA/CA)

## (undated) DEVICE — SENSOR OXIMETER ADULT SPO2 RD SET (20EA/BX)

## (undated) DEVICE — ELECTRODE 850 FOAM ADHESIVE - HYDROGEL RADIOTRNSPRNT (50/PK)

## (undated) DEVICE — TOWEL STOP TIMEOUT SAFETY FLAG (40EA/CA)

## (undated) DEVICE — FILM CASSETTE ENDO

## (undated) DEVICE — CATHETER IV 20 GA X 1-1/4 ---SURG.& SDS ONLY--- (50EA/BX)

## (undated) DEVICE — NEPTUNE 4 PORT MANIFOLD - (20/PK)

## (undated) DEVICE — PORT AUXILLARY WATER (50EA/BX)

## (undated) DEVICE — CANNULA O2 COMFORT SOFT EAR ADULT 7 FT TUBING (50/CA)

## (undated) DEVICE — SET LEADWIRE 5 LEAD BEDSIDE DISPOSABLE ECG (1SET OF 5/EA)

## (undated) DEVICE — BLOCK BITE ENDOSCOPIC 2809 - (100/BX) INTERMEDIATE

## (undated) DEVICE — KIT CUSTOM PROCEDURE SINGLE FOR ENDO  (15/CA)

## (undated) DEVICE — LACTATED RINGERS INJ 1000 ML - (14EA/CA 60CA/PF)